# Patient Record
Sex: MALE | Race: WHITE | NOT HISPANIC OR LATINO | Employment: FULL TIME | URBAN - NONMETROPOLITAN AREA
[De-identification: names, ages, dates, MRNs, and addresses within clinical notes are randomized per-mention and may not be internally consistent; named-entity substitution may affect disease eponyms.]

---

## 2017-04-06 ENCOUNTER — OFFICE VISIT (OUTPATIENT)
Dept: INTERNAL MEDICINE | Facility: CLINIC | Age: 67
End: 2017-04-06

## 2017-04-06 VITALS
WEIGHT: 224 LBS | HEART RATE: 71 BPM | DIASTOLIC BLOOD PRESSURE: 76 MMHG | SYSTOLIC BLOOD PRESSURE: 130 MMHG | TEMPERATURE: 98.4 F | HEIGHT: 70 IN | OXYGEN SATURATION: 96 % | BODY MASS INDEX: 32.07 KG/M2

## 2017-04-06 DIAGNOSIS — Z11.59 NEED FOR HEPATITIS C SCREENING TEST: Primary | ICD-10-CM

## 2017-04-06 DIAGNOSIS — K22.70 BARRETT'S ESOPHAGUS WITHOUT DYSPLASIA: ICD-10-CM

## 2017-04-06 DIAGNOSIS — Z12.11 ENCOUNTER FOR SCREENING COLONOSCOPY: ICD-10-CM

## 2017-04-06 DIAGNOSIS — Z12.11 SCREEN FOR COLON CANCER: ICD-10-CM

## 2017-04-06 DIAGNOSIS — B35.1 ONYCHOMYCOSIS: ICD-10-CM

## 2017-04-06 DIAGNOSIS — I10 ESSENTIAL HYPERTENSION: ICD-10-CM

## 2017-04-06 DIAGNOSIS — E78.5 HYPERLIPIDEMIA, UNSPECIFIED HYPERLIPIDEMIA TYPE: ICD-10-CM

## 2017-04-06 DIAGNOSIS — E55.9 VITAMIN D DEFICIENCY: ICD-10-CM

## 2017-04-06 DIAGNOSIS — K21.9 GASTROESOPHAGEAL REFLUX DISEASE, ESOPHAGITIS PRESENCE NOT SPECIFIED: ICD-10-CM

## 2017-04-06 DIAGNOSIS — R73.03 PREDIABETES: ICD-10-CM

## 2017-04-06 DIAGNOSIS — R73.9 HYPERGLYCEMIA: ICD-10-CM

## 2017-04-06 PROCEDURE — 99214 OFFICE O/P EST MOD 30 MIN: CPT | Performed by: INTERNAL MEDICINE

## 2017-04-06 RX ORDER — SILDENAFIL 100 MG/1
100 TABLET, FILM COATED ORAL DAILY PRN
Qty: 25 TABLET | Refills: 1 | Status: SHIPPED | OUTPATIENT
Start: 2017-04-06 | End: 2017-04-11 | Stop reason: SDUPTHER

## 2017-04-06 NOTE — PROGRESS NOTES
"Chief Complaint   Patient presents with   • Follow-up     6 months for HLD, HTN, and Prediabetes. Pt would like to get colonoscopy set up.      Subjective   Erik Arcos is a 66 y.o. male.     HPI Comments: PMH of HTN, HLD, GERD, ED, preDM.   No CP, SOB, palpitations. Has occasional edema if on feet a lot.   GERD is well controlled. He has York's esophagus. His next EGD is due now.  His colonoscopy is also due now.   Does not check BS and rarely eats sweets. He denies any NTB of feet. Last eye exam was 4-5 yrs ago.  He does drink diet soda.   Takes his vitD daily.  He states the viagra is working well for him.        The following portions of the patient's history were reviewed and updated as appropriate: allergies, current medications, past family history, past medical history, past social history, past surgical history and problem list.    Review of Systems   Respiratory: Negative for shortness of breath.    Cardiovascular: Positive for leg swelling. Negative for chest pain and palpitations.   Gastrointestinal: Negative for abdominal pain and blood in stool.        Rare GERD symptoms   Genitourinary:        Erectile dysfunction   Musculoskeletal: Negative for myalgias.   All other systems reviewed and are negative.      Objective   /76  Pulse 71  Temp 98.4 °F (36.9 °C)  Ht 70\" (177.8 cm)  Wt 224 lb (102 kg)  SpO2 96%  BMI 32.14 kg/m2  Body mass index is 32.14 kg/(m^2).  Physical Exam   Constitutional: He is oriented to person, place, and time. He appears well-developed and well-nourished.   Obese   HENT:   Head: Normocephalic and atraumatic.   Mouth/Throat: Oropharynx is clear and moist.   Eyes: Conjunctivae and EOM are normal. Pupils are equal, round, and reactive to light.   Neck: Normal range of motion. Neck supple. No thyromegaly present.   Cardiovascular: Normal rate, regular rhythm, normal heart sounds and intact distal pulses.    No murmur heard.  Pulmonary/Chest: Effort normal and breath " sounds normal. No respiratory distress.   Abdominal: Soft. Bowel sounds are normal.   Musculoskeletal: He exhibits no edema.   Lymphadenopathy:     He has no cervical adenopathy.   Neurological: He is alert and oriented to person, place, and time. No cranial nerve deficit.   Skin:   Thickened and loose great toenails, no callus or ulcer of feet, normal sensation in feet   Psychiatric: He has a normal mood and affect. Judgment normal.   Nursing note and vitals reviewed.      Assessment/Plan   Erik Arcos is here today and the following problems have been addressed:      Erik was seen today for follow-up.    Diagnoses and all orders for this visit:    Need for hepatitis C screening test  -     Hepatitis C antibody    Essential hypertension  -     Basic Metabolic Panel    Hyperlipidemia, unspecified hyperlipidemia type  -     Lipid Panel    Vitamin D deficiency    Gastroesophageal reflux disease, esophagitis presence not specified    Prediabetes  -     Basic Metabolic Panel  -     Hemoglobin A1c  -     Ambulatory Referral to Ophthalmology    Encounter for screening colonoscopy  -     Ambulatory Referral to Gastroenterology    Hyperglycemia  -     Hemoglobin A1c    York's esophagus without dysplasia  -     Ambulatory Referral to Gastroenterology    Onychomycosis    Other orders  -     sildenafil (VIAGRA) 100 MG tablet; Take 1 tablet by mouth Daily As Needed for erectile dysfunction.    Follow heart healthy/low salt/low carbohydrate diet  Avoid processed foods  Monitor blood pressure as discussed  Exercise as tolerated up to 30 minutes 5 days per week  Take all medications as prescribed  Labs as noted  Referred for eye exam  Referred for EGD and colonoscopy  Will RTC for removal of great toenails bilaterally as these are loose    RTC one month for nail removal      Please note that portions of this note were completed with a voice recognition program.  Efforts were made to edit dictation, but occasionally  words are mistranscribed.

## 2017-04-07 LAB
BUN SERPL-MCNC: 14 MG/DL (ref 7–20)
BUN/CREAT SERPL: 17.5 (ref 6.3–21.9)
CALCIUM SERPL-MCNC: 9.9 MG/DL (ref 8.4–10.2)
CHLORIDE SERPL-SCNC: 100 MMOL/L (ref 98–107)
CHOLEST SERPL-MCNC: 162 MG/DL (ref 0–199)
CO2 SERPL-SCNC: 30 MMOL/L (ref 26–30)
CREAT SERPL-MCNC: 0.8 MG/DL (ref 0.6–1.3)
GLUCOSE SERPL-MCNC: 109 MG/DL (ref 74–98)
HBA1C MFR BLD: 5.5 %
HCV AB S/CO SERPL IA: 0.1 S/CO RATIO (ref 0–0.9)
HDLC SERPL-MCNC: 55 MG/DL (ref 40–60)
LDLC SERPL CALC-MCNC: 90 MG/DL (ref 0–99)
POTASSIUM SERPL-SCNC: 5 MMOL/L (ref 3.5–5.1)
SODIUM SERPL-SCNC: 138 MMOL/L (ref 137–145)
TRIGL SERPL-MCNC: 84 MG/DL
VLDLC SERPL CALC-MCNC: 16.8 MG/DL

## 2017-04-11 ENCOUNTER — TELEPHONE (OUTPATIENT)
Dept: INTERNAL MEDICINE | Facility: CLINIC | Age: 67
End: 2017-04-11

## 2017-04-11 RX ORDER — ATORVASTATIN CALCIUM 20 MG/1
20 TABLET, FILM COATED ORAL DAILY
Qty: 90 TABLET | Refills: 1 | Status: SHIPPED | OUTPATIENT
Start: 2017-04-11 | End: 2017-11-14 | Stop reason: SDUPTHER

## 2017-04-11 RX ORDER — SILDENAFIL 100 MG/1
100 TABLET, FILM COATED ORAL DAILY PRN
Qty: 25 TABLET | Refills: 1 | Status: SHIPPED | OUTPATIENT
Start: 2017-04-11 | End: 2017-11-14 | Stop reason: SDUPTHER

## 2017-04-11 RX ORDER — LISINOPRIL 40 MG/1
40 TABLET ORAL DAILY
Qty: 90 TABLET | Refills: 1 | Status: SHIPPED | OUTPATIENT
Start: 2017-04-11 | End: 2017-11-14 | Stop reason: SDUPTHER

## 2017-04-11 RX ORDER — OMEPRAZOLE 20 MG/1
20 CAPSULE, DELAYED RELEASE ORAL DAILY
Qty: 90 CAPSULE | Refills: 1 | Status: SHIPPED | OUTPATIENT
Start: 2017-04-11 | End: 2017-11-14 | Stop reason: SDUPTHER

## 2017-04-11 NOTE — TELEPHONE ENCOUNTER
----- Message from Rosie Calvillo sent at 4/11/2017  2:16 PM EDT -----  Contact: Patient  Patient said he was talking to you before about his meds being mail ordered.    He said you guys got cut off and he would like a callback.

## 2017-05-04 ENCOUNTER — PROCEDURE VISIT (OUTPATIENT)
Dept: INTERNAL MEDICINE | Facility: CLINIC | Age: 67
End: 2017-05-04

## 2017-05-04 VITALS
HEART RATE: 69 BPM | SYSTOLIC BLOOD PRESSURE: 128 MMHG | WEIGHT: 222.4 LBS | OXYGEN SATURATION: 97 % | DIASTOLIC BLOOD PRESSURE: 70 MMHG | BODY MASS INDEX: 31.84 KG/M2 | HEIGHT: 70 IN | TEMPERATURE: 97.4 F

## 2017-05-04 DIAGNOSIS — B35.1 ONYCHOMYCOSIS: Primary | ICD-10-CM

## 2017-05-04 PROCEDURE — 11750 EXCISION NAIL&NAIL MATRIX: CPT | Performed by: INTERNAL MEDICINE

## 2017-05-11 ENCOUNTER — OFFICE VISIT (OUTPATIENT)
Dept: INTERNAL MEDICINE | Facility: CLINIC | Age: 67
End: 2017-05-11

## 2017-05-11 VITALS
WEIGHT: 220 LBS | BODY MASS INDEX: 31.5 KG/M2 | SYSTOLIC BLOOD PRESSURE: 130 MMHG | OXYGEN SATURATION: 97 % | TEMPERATURE: 97.5 F | DIASTOLIC BLOOD PRESSURE: 80 MMHG | HEIGHT: 70 IN | HEART RATE: 66 BPM

## 2017-05-11 DIAGNOSIS — H00.015 HORDEOLUM EXTERNUM OF LEFT LOWER EYELID: ICD-10-CM

## 2017-05-11 DIAGNOSIS — B35.1 ONYCHOMYCOSIS: Primary | ICD-10-CM

## 2017-05-11 PROCEDURE — 99212 OFFICE O/P EST SF 10 MIN: CPT | Performed by: INTERNAL MEDICINE

## 2017-11-14 ENCOUNTER — OFFICE VISIT (OUTPATIENT)
Dept: INTERNAL MEDICINE | Facility: CLINIC | Age: 67
End: 2017-11-14

## 2017-11-14 VITALS
SYSTOLIC BLOOD PRESSURE: 124 MMHG | HEART RATE: 76 BPM | HEIGHT: 70 IN | OXYGEN SATURATION: 99 % | WEIGHT: 235 LBS | BODY MASS INDEX: 33.64 KG/M2 | TEMPERATURE: 97.9 F | DIASTOLIC BLOOD PRESSURE: 70 MMHG

## 2017-11-14 DIAGNOSIS — Z12.5 SCREENING PSA (PROSTATE SPECIFIC ANTIGEN): ICD-10-CM

## 2017-11-14 DIAGNOSIS — N52.9 MALE ERECTILE DISORDER: ICD-10-CM

## 2017-11-14 DIAGNOSIS — R73.03 PREDIABETES: ICD-10-CM

## 2017-11-14 DIAGNOSIS — R73.9 HYPERGLYCEMIA: ICD-10-CM

## 2017-11-14 DIAGNOSIS — E78.2 MIXED HYPERLIPIDEMIA: ICD-10-CM

## 2017-11-14 DIAGNOSIS — I10 ESSENTIAL HYPERTENSION: Primary | ICD-10-CM

## 2017-11-14 DIAGNOSIS — R39.12 POOR URINARY STREAM: ICD-10-CM

## 2017-11-14 LAB
BUN SERPL-MCNC: 15 MG/DL (ref 7–20)
BUN/CREAT SERPL: 21.4 (ref 6.3–21.9)
CALCIUM SERPL-MCNC: 9.6 MG/DL (ref 8.4–10.2)
CHLORIDE SERPL-SCNC: 100 MMOL/L (ref 98–107)
CO2 SERPL-SCNC: 26 MMOL/L (ref 26–30)
CREAT SERPL-MCNC: 0.7 MG/DL (ref 0.6–1.3)
GFR SERPLBLD CREATININE-BSD FMLA CKD-EPI: 112 ML/MIN/1.73
GFR SERPLBLD CREATININE-BSD FMLA CKD-EPI: 136 ML/MIN/1.73
GLUCOSE SERPL-MCNC: 105 MG/DL (ref 74–98)
HBA1C MFR BLD: 5.6 %
POC CREATININE URINE: 200
POC MICROALBUMIN URINE: 10
POTASSIUM SERPL-SCNC: 4.9 MMOL/L (ref 3.5–5.1)
PSA SERPL-MCNC: 2.74 NG/ML (ref 0.06–4)
SODIUM SERPL-SCNC: 138 MMOL/L (ref 137–145)

## 2017-11-14 PROCEDURE — 90670 PCV13 VACCINE IM: CPT | Performed by: INTERNAL MEDICINE

## 2017-11-14 PROCEDURE — G0009 ADMIN PNEUMOCOCCAL VACCINE: HCPCS | Performed by: INTERNAL MEDICINE

## 2017-11-14 PROCEDURE — 82044 UR ALBUMIN SEMIQUANTITATIVE: CPT | Performed by: INTERNAL MEDICINE

## 2017-11-14 PROCEDURE — 99213 OFFICE O/P EST LOW 20 MIN: CPT | Performed by: INTERNAL MEDICINE

## 2017-11-14 RX ORDER — ATORVASTATIN CALCIUM 20 MG/1
20 TABLET, FILM COATED ORAL DAILY
Qty: 90 TABLET | Refills: 1 | Status: SHIPPED | OUTPATIENT
Start: 2017-11-14 | End: 2017-11-17 | Stop reason: SDUPTHER

## 2017-11-14 RX ORDER — OMEPRAZOLE 20 MG/1
20 CAPSULE, DELAYED RELEASE ORAL DAILY
Qty: 90 CAPSULE | Refills: 1 | Status: SHIPPED | OUTPATIENT
Start: 2017-11-14 | End: 2017-11-17 | Stop reason: SDUPTHER

## 2017-11-14 RX ORDER — LISINOPRIL 40 MG/1
40 TABLET ORAL DAILY
Qty: 90 TABLET | Refills: 1 | Status: SHIPPED | OUTPATIENT
Start: 2017-11-14 | End: 2017-11-17 | Stop reason: SDUPTHER

## 2017-11-14 RX ORDER — SILDENAFIL 100 MG/1
100 TABLET, FILM COATED ORAL DAILY PRN
Qty: 25 TABLET | Refills: 1 | Status: SHIPPED | OUTPATIENT
Start: 2017-11-14 | End: 2017-11-17 | Stop reason: SDUPTHER

## 2017-11-14 NOTE — PROGRESS NOTES
"Chief Complaint   Patient presents with   • Follow-up     6 months for HLD, HTN, and prediabetes. No new complaints.      Subjective   Erik Arcos is a 66 y.o. male.     HPI Comments: Here for follow-up of HTN, HLD, GERD, ED, preDM.   No CP, SOB, palpitations. Has occasional edema if on feet a lot.  His BP is well controlled.   He has had upper and lower endoscopy.  He will need both again in 3 yrs.   Had one polyp on colonoscopy.  He takes his lipitor every PM.  He does drink diet soda, avoids most sweets.  A1C was 5.5 in April.   His GERD is usually well controlled.   He does not have any nocturia, no hesitancy or dribbling.  No prostate CA in family.            The following portions of the patient's history were reviewed and updated as appropriate: allergies, current medications, past family history, past medical history, past social history, past surgical history and problem list.    Review of Systems   Respiratory: Negative for shortness of breath.    Cardiovascular: Positive for leg swelling. Negative for chest pain and palpitations.   Gastrointestinal:        Rare GERD symptoms   Genitourinary: Negative.    Musculoskeletal: Positive for arthralgias. Negative for myalgias.   All other systems reviewed and are negative.      Objective   /70  Pulse 76  Temp 97.9 °F (36.6 °C)  Ht 70\" (177.8 cm)  Wt 235 lb (107 kg)  SpO2 99%  BMI 33.72 kg/m2  Body mass index is 33.72 kg/(m^2).  Physical Exam   Constitutional: He is oriented to person, place, and time. He appears well-developed and well-nourished.   HENT:   Head: Normocephalic and atraumatic.   Mouth/Throat: Oropharynx is clear and moist.   Eyes: Conjunctivae and EOM are normal. Pupils are equal, round, and reactive to light.   Neck: Normal range of motion. Neck supple. No thyromegaly present.   Cardiovascular: Normal rate, regular rhythm, normal heart sounds and intact distal pulses.    No murmur heard.  Pulmonary/Chest: Effort normal and breath " sounds normal. No respiratory distress.   Abdominal: Soft. Bowel sounds are normal.   Musculoskeletal: He exhibits no edema.   Lymphadenopathy:     He has no cervical adenopathy.   Neurological: He is alert and oriented to person, place, and time. No cranial nerve deficit.   Psychiatric: He has a normal mood and affect. Judgment normal.   Nursing note and vitals reviewed.      Assessment/Plan   Erik Arcos is here today and the following problems have been addressed:      Eirk was seen today for follow-up.    Diagnoses and all orders for this visit:    Essential hypertension  -     Basic Metabolic Panel    Mixed hyperlipidemia    Hyperglycemia  -     Basic Metabolic Panel  -     Hemoglobin A1c    Screening PSA (prostate specific antigen)  -     PSA    Male erectile disorder  -     PSA    Poor urinary stream   -     PSA    Other orders  -     sildenafil (VIAGRA) 100 MG tablet; Take 1 tablet by mouth Daily As Needed for erectile dysfunction.  -     omeprazole (priLOSEC) 20 MG capsule; Take 1 capsule by mouth Daily.  -     lisinopril (PRINIVIL,ZESTRIL) 40 MG tablet; Take 1 tablet by mouth Daily.  -     atorvastatin (LIPITOR) 20 MG tablet; Take 1 tablet by mouth Daily.    Follow heart healthy/low salt diet  Avoid processed foods  Monitor blood pressure as discussed  Exercise as tolerated up to 30 minutes 5 days per week  Take all medications as prescribed  Labs as noted  Declines flu shot  He will have prevnar vaccine 13 today  He is to work on diet to avoid sweets and pop    RTC 6 mo  Please note that portions of this note were completed with a voice recognition program.  Efforts were made to edit dictation, but occasionally words are mistranscribed.

## 2017-11-17 RX ORDER — SILDENAFIL 100 MG/1
100 TABLET, FILM COATED ORAL DAILY PRN
Qty: 25 TABLET | Refills: 1 | Status: SHIPPED | OUTPATIENT
Start: 2017-11-17 | End: 2018-05-16 | Stop reason: SDUPTHER

## 2017-11-17 RX ORDER — OMEPRAZOLE 20 MG/1
20 CAPSULE, DELAYED RELEASE ORAL DAILY
Qty: 90 CAPSULE | Refills: 3 | Status: SHIPPED | OUTPATIENT
Start: 2017-11-17 | End: 2019-01-23 | Stop reason: SDUPTHER

## 2017-11-17 RX ORDER — LISINOPRIL 40 MG/1
40 TABLET ORAL DAILY
Qty: 90 TABLET | Refills: 1 | Status: SHIPPED | OUTPATIENT
Start: 2017-11-17 | End: 2018-05-16 | Stop reason: SDUPTHER

## 2017-11-17 RX ORDER — ATORVASTATIN CALCIUM 20 MG/1
20 TABLET, FILM COATED ORAL DAILY
Qty: 90 TABLET | Refills: 1 | Status: SHIPPED | OUTPATIENT
Start: 2017-11-17 | End: 2018-05-16 | Stop reason: SDUPTHER

## 2018-05-16 ENCOUNTER — OFFICE VISIT (OUTPATIENT)
Dept: INTERNAL MEDICINE | Facility: CLINIC | Age: 68
End: 2018-05-16

## 2018-05-16 VITALS
WEIGHT: 248 LBS | HEART RATE: 76 BPM | HEIGHT: 70 IN | DIASTOLIC BLOOD PRESSURE: 80 MMHG | SYSTOLIC BLOOD PRESSURE: 124 MMHG | BODY MASS INDEX: 35.5 KG/M2 | TEMPERATURE: 97.9 F | OXYGEN SATURATION: 96 %

## 2018-05-16 DIAGNOSIS — E78.2 MIXED HYPERLIPIDEMIA: ICD-10-CM

## 2018-05-16 DIAGNOSIS — K21.00 GASTROESOPHAGEAL REFLUX DISEASE WITH ESOPHAGITIS: ICD-10-CM

## 2018-05-16 DIAGNOSIS — I10 ESSENTIAL HYPERTENSION: Primary | ICD-10-CM

## 2018-05-16 DIAGNOSIS — R73.03 PREDIABETES: ICD-10-CM

## 2018-05-16 PROBLEM — R73.9 HYPERGLYCEMIA: Status: RESOLVED | Noted: 2017-04-06 | Resolved: 2018-05-16

## 2018-05-16 PROBLEM — H00.015 HORDEOLUM EXTERNUM OF LEFT LOWER EYELID: Status: RESOLVED | Noted: 2017-05-11 | Resolved: 2018-05-16

## 2018-05-16 LAB
ALBUMIN SERPL-MCNC: 4.4 G/DL (ref 3.5–5)
ALBUMIN/GLOB SERPL: 1.6 G/DL (ref 1–2)
ALP SERPL-CCNC: 67 U/L (ref 38–126)
ALT SERPL-CCNC: 32 U/L (ref 13–69)
AST SERPL-CCNC: 25 U/L (ref 15–46)
BASOPHILS # BLD AUTO: 0.04 10*3/MM3 (ref 0–0.2)
BASOPHILS NFR BLD AUTO: 0.6 % (ref 0–2.5)
BILIRUB SERPL-MCNC: 0.7 MG/DL (ref 0.2–1.3)
BUN SERPL-MCNC: 14 MG/DL (ref 7–20)
BUN/CREAT SERPL: 20 (ref 6.3–21.9)
CALCIUM SERPL-MCNC: 9.5 MG/DL (ref 8.4–10.2)
CHLORIDE SERPL-SCNC: 98 MMOL/L (ref 98–107)
CHOLEST SERPL-MCNC: 175 MG/DL (ref 0–199)
CO2 SERPL-SCNC: 31 MMOL/L (ref 26–30)
CREAT SERPL-MCNC: 0.7 MG/DL (ref 0.6–1.3)
EOSINOPHIL # BLD AUTO: 0.14 10*3/MM3 (ref 0–0.7)
EOSINOPHIL NFR BLD AUTO: 2.2 % (ref 0–7)
ERYTHROCYTE [DISTWIDTH] IN BLOOD BY AUTOMATED COUNT: 13.3 % (ref 11.5–14.5)
GFR SERPLBLD CREATININE-BSD FMLA CKD-EPI: 112 ML/MIN/1.73
GFR SERPLBLD CREATININE-BSD FMLA CKD-EPI: 136 ML/MIN/1.73
GLOBULIN SER CALC-MCNC: 2.7 GM/DL
GLUCOSE SERPL-MCNC: 108 MG/DL (ref 74–98)
HBA1C MFR BLD: 5.7 %
HCT VFR BLD AUTO: 43.2 % (ref 42–52)
HDLC SERPL-MCNC: 60 MG/DL (ref 40–60)
HGB BLD-MCNC: 14.3 G/DL (ref 14–18)
IMM GRANULOCYTES # BLD: 0.03 10*3/MM3 (ref 0–0.06)
IMM GRANULOCYTES NFR BLD: 0.5 % (ref 0–0.6)
LDLC SERPL CALC-MCNC: 97 MG/DL (ref 0–99)
LYMPHOCYTES # BLD AUTO: 1.27 10*3/MM3 (ref 0.6–3.4)
LYMPHOCYTES NFR BLD AUTO: 19.9 % (ref 10–50)
MCH RBC QN AUTO: 28.9 PG (ref 27–31)
MCHC RBC AUTO-ENTMCNC: 33.1 G/DL (ref 30–37)
MCV RBC AUTO: 87.3 FL (ref 80–94)
MONOCYTES # BLD AUTO: 0.43 10*3/MM3 (ref 0–0.9)
MONOCYTES NFR BLD AUTO: 6.7 % (ref 0–12)
NEUTROPHILS # BLD AUTO: 4.47 10*3/MM3 (ref 2–6.9)
NEUTROPHILS NFR BLD AUTO: 70.1 % (ref 37–80)
NRBC BLD AUTO-RTO: 0 /100 WBC (ref 0–0)
PLATELET # BLD AUTO: 268 10*3/MM3 (ref 130–400)
POTASSIUM SERPL-SCNC: 4.6 MMOL/L (ref 3.5–5.1)
PROT SERPL-MCNC: 7.1 G/DL (ref 6.3–8.2)
RBC # BLD AUTO: 4.95 10*6/MM3 (ref 4.7–6.1)
SODIUM SERPL-SCNC: 138 MMOL/L (ref 137–145)
TRIGL SERPL-MCNC: 92 MG/DL
VLDLC SERPL CALC-MCNC: 18.4 MG/DL
WBC # BLD AUTO: 6.38 10*3/MM3 (ref 4.8–10.8)

## 2018-05-16 PROCEDURE — 99213 OFFICE O/P EST LOW 20 MIN: CPT | Performed by: INTERNAL MEDICINE

## 2018-05-16 RX ORDER — ATORVASTATIN CALCIUM 20 MG/1
20 TABLET, FILM COATED ORAL DAILY
Qty: 90 TABLET | Refills: 3 | Status: SHIPPED | OUTPATIENT
Start: 2018-05-16 | End: 2019-08-06 | Stop reason: SDUPTHER

## 2018-05-16 RX ORDER — LISINOPRIL 40 MG/1
40 TABLET ORAL DAILY
Qty: 90 TABLET | Refills: 1 | Status: SHIPPED | OUTPATIENT
Start: 2018-05-16 | End: 2019-01-23 | Stop reason: SDUPTHER

## 2018-05-16 RX ORDER — SILDENAFIL 100 MG/1
100 TABLET, FILM COATED ORAL DAILY PRN
Qty: 25 TABLET | Refills: 1 | Status: SHIPPED | OUTPATIENT
Start: 2018-05-16 | End: 2019-01-23 | Stop reason: SDUPTHER

## 2018-05-16 NOTE — PROGRESS NOTES
"Chief Complaint   Patient presents with   • Follow-up     6 months for HLD and HTN. No new complaints.      Subjective   Erik Arcos is a 67 y.o. male.     Here for follow-up of HTN, HLD, GERD, ED, preDM.   BP is well controlled here, he does not check it at home.  No CP, SOB, palpitations or edema  He had a colonoscopy last yr in June.  No black stools or blood in stool.   His weight is up 13 lbs over past 6 mo, he did not exercise or follow HH diet over the winter.  He is more active over the summer months.  He does drink soda at work.  Has occasional GERD sxs, worse since wt gain.    He continues to take vit D daily.   osteobiflex is helpful for his joint pain due to DJD.          The following portions of the patient's history were reviewed and updated as appropriate: allergies, current medications, past family history, past medical history, past social history, past surgical history and problem list.    Review of Systems   Constitutional: Positive for unexpected weight change. Negative for activity change and appetite change.   HENT: Positive for postnasal drip.    Respiratory: Negative for shortness of breath.    Cardiovascular: Negative for chest pain, palpitations and leg swelling.   Gastrointestinal: Negative for abdominal pain and blood in stool.        Occasional GERD sxs   Genitourinary: Negative.    Musculoskeletal: Positive for arthralgias.   Allergic/Immunologic: Positive for environmental allergies.   Psychiatric/Behavioral: Negative for dysphoric mood.   All other systems reviewed and are negative.      Objective   /80   Pulse 76   Temp 97.9 °F (36.6 °C)   Ht 177.8 cm (70\")   Wt 112 kg (248 lb)   SpO2 96%   BMI 35.58 kg/m²   Body mass index is 35.58 kg/m².  Physical Exam   Constitutional: He is oriented to person, place, and time. He appears well-developed and well-nourished.   Pleasant man, obese, NAD   HENT:   Head: Normocephalic and atraumatic.   Mouth/Throat: Oropharynx is clear " and moist.   Eyes: Conjunctivae and EOM are normal. Pupils are equal, round, and reactive to light.   Neck: Normal range of motion. Neck supple. No thyromegaly present.   Cardiovascular: Normal rate, regular rhythm, normal heart sounds and intact distal pulses.    No murmur heard.  Pulmonary/Chest: Effort normal and breath sounds normal. No respiratory distress.   Abdominal: Soft. Bowel sounds are normal.   Musculoskeletal: He exhibits no edema.   Lymphadenopathy:     He has no cervical adenopathy.   Neurological: He is alert and oriented to person, place, and time. No cranial nerve deficit.   Skin:   Right upper back with large lipoma noted   Psychiatric: He has a normal mood and affect. Judgment normal.   Nursing note and vitals reviewed.      Assessment/Plan   Erik Arcos is here today and the following problems have been addressed:      Erik was seen today for follow-up.    Diagnoses and all orders for this visit:    Essential hypertension  -     CBC & Differential  -     Comprehensive Metabolic Panel    Mixed hyperlipidemia  -     Comprehensive Metabolic Panel  -     Lipid Panel    Gastroesophageal reflux disease with esophagitis    Prediabetes  -     Comprehensive Metabolic Panel  -     Hemoglobin A1c    Other orders  -     lisinopril (PRINIVIL,ZESTRIL) 40 MG tablet; Take 1 tablet by mouth Daily.  -     sildenafil (VIAGRA) 100 MG tablet; Take 1 tablet by mouth Daily As Needed for erectile dysfunction.  -     atorvastatin (LIPITOR) 20 MG tablet; Take 1 tablet by mouth Daily.    Follow heart healthy/low salt/low sweets diet  Avoid processed foods  Monitor blood pressure as discussed  Exercise as tolerated up to 30 minutes 5 days per week  Take all medications as prescribed  Labs as noted  Continue omeprazole for GERD sxs  Continue osteobiflex for DJD  Decrease portion size and avoid sodas to help with wt loss    RTC 6 mo for medicare wellness exam          Please note that portions of this note were  completed with a voice recognition program.  Efforts were made to edit dictation, but occasionally words are mistranscribed.

## 2018-11-20 ENCOUNTER — OFFICE VISIT (OUTPATIENT)
Dept: INTERNAL MEDICINE | Facility: CLINIC | Age: 68
End: 2018-11-20

## 2018-11-20 VITALS
WEIGHT: 251.25 LBS | SYSTOLIC BLOOD PRESSURE: 130 MMHG | BODY MASS INDEX: 35.97 KG/M2 | TEMPERATURE: 97.1 F | HEIGHT: 70 IN | OXYGEN SATURATION: 97 % | DIASTOLIC BLOOD PRESSURE: 82 MMHG | HEART RATE: 79 BPM

## 2018-11-20 DIAGNOSIS — K22.70 BARRETT'S ESOPHAGUS WITHOUT DYSPLASIA: ICD-10-CM

## 2018-11-20 DIAGNOSIS — Z12.5 SCREENING PSA (PROSTATE SPECIFIC ANTIGEN): ICD-10-CM

## 2018-11-20 DIAGNOSIS — I10 ESSENTIAL HYPERTENSION: ICD-10-CM

## 2018-11-20 DIAGNOSIS — R73.03 PREDIABETES: ICD-10-CM

## 2018-11-20 DIAGNOSIS — B35.1 ONYCHOMYCOSIS: Primary | ICD-10-CM

## 2018-11-20 DIAGNOSIS — E78.2 MIXED HYPERLIPIDEMIA: ICD-10-CM

## 2018-11-20 DIAGNOSIS — E55.9 VITAMIN D DEFICIENCY: ICD-10-CM

## 2018-11-20 DIAGNOSIS — Z00.00 ENCOUNTER FOR MEDICARE ANNUAL WELLNESS EXAM: ICD-10-CM

## 2018-11-20 LAB
POC CREATININE URINE: 200
POC MICROALBUMIN URINE: 30
PSA SERPL-MCNC: 2.83 NG/ML (ref 0.06–4)

## 2018-11-20 PROCEDURE — 82044 UR ALBUMIN SEMIQUANTITATIVE: CPT | Performed by: INTERNAL MEDICINE

## 2018-11-20 PROCEDURE — G0439 PPPS, SUBSEQ VISIT: HCPCS | Performed by: INTERNAL MEDICINE

## 2018-11-20 NOTE — PROGRESS NOTES
QUICK REFERENCE INFORMATION:  The ABCs of the Annual Wellness Visit    Subsequent Medicare Wellness Visit    HEALTH RISK ASSESSMENT    1950    Recent Hospitalizations:  No hospitalization(s) within the last year..        Current Medical Providers:  Patient Care Team:  Vermeesch, Marilyn K, MD as PCP - General  Vermeesch, Marilyn K, MD as PCP - Family Medicine  Vermeesch, Marilyn K, MD as PCP - Claims Attributed        Smoking Status:  Social History     Tobacco Use   Smoking Status Former Smoker   Smokeless Tobacco Never Used       Alcohol Consumption:  Social History     Substance and Sexual Activity   Alcohol Use No       Depression Screen:   PHQ-2/PHQ-9 Depression Screening 11/20/2018   Little interest or pleasure in doing things 0   Feeling down, depressed, or hopeless 0   Trouble falling or staying asleep, or sleeping too much -   Feeling tired or having little energy -   Poor appetite or overeating -   Feeling bad about yourself - or that you are a failure or have let yourself or your family down -   Trouble concentrating on things, such as reading the newspaper or watching television -   Moving or speaking so slowly that other people could have noticed. Or the opposite - being so fidgety or restless that you have been moving around a lot more than usual -   Thoughts that you would be better off dead, or of hurting yourself in some way -   Total Score 0   If you checked off any problems, how difficult have these problems made it for you to do your work, take care of things at home, or get along with other people? -       Health Habits and Functional and Cognitive Screening:  Functional & Cognitive Status 11/20/2018   Do you have difficulty preparing food and eating? No   Do you have difficulty bathing yourself, getting dressed or grooming yourself? No   Do you have difficulty using the toilet? No   Do you have difficulty moving around from place to place? No   Do you have trouble with steps or getting  out of a bed or a chair? No   In the past year have you fallen or experienced a near fall? No   Current Diet Well Balanced Diet   Dental Exam Not up to date   Eye Exam Up to date   Exercise (times per week) 4 times per week   Current Exercise Activities Include Walking   Do you need help using the phone?  No   Are you deaf or do you have serious difficulty hearing?  No   Do you need help with transportation? No   Do you need help shopping? No   Do you need help preparing meals?  No   Do you need help with housework?  No   Do you need help with laundry? No   Do you need help taking your medications? No   Do you need help managing money? No   Do you ever drive or ride in a car without wearing a seat belt? No   Have you felt unusual stress, anger or loneliness in the last month? No   Who do you live with? Spouse   If you need help, do you have trouble finding someone available to you? No   Have you been bothered in the last four weeks by sexual problems? No   Do you have difficulty concentrating, remembering or making decisions? No           Does the patient have evidence of cognitive impairment? No    Aspirin use counseling: Taking ASA appropriately as indicated      Recent Lab Results:  CMP:  Lab Results   Component Value Date     (H) 05/16/2018    BUN 14 05/16/2018    CREATININE 0.70 05/16/2018    EGFRIFNONA 112 05/16/2018    EGFRIFAFRI 136 05/16/2018    BCR 20.0 05/16/2018     05/16/2018    K 4.6 05/16/2018    CO2 31.0 (H) 05/16/2018    CALCIUM 9.5 05/16/2018    PROTENTOTREF 7.1 05/16/2018    ALBUMIN 4.40 05/16/2018    LABGLOBREF 2.7 05/16/2018    LABIL2 1.6 05/16/2018    BILITOT 0.7 05/16/2018    ALKPHOS 67 05/16/2018    AST 25 05/16/2018    ALT 32 05/16/2018     Lipid Panel:  Lab Results   Component Value Date    TRIG 92 05/16/2018    HDL 60 05/16/2018    VLDL 18.4 05/16/2018     HbA1c:  Lab Results   Component Value Date    HGBA1C 5.70 05/16/2018       Visual Acuity:  No exam data  present    Age-appropriate Screening Schedule:  Refer to the list below for future screening recommendations based on patient's age, sex and/or medical conditions. Orders for these recommended tests are listed in the plan section. The patient has been provided with a written plan.    Health Maintenance   Topic Date Due   • ZOSTER VACCINE (1 of 2) 11/22/1998   • DIABETIC FOOT EXAM  08/03/2016   • DIABETIC EYE EXAM  04/27/2018   • URINE MICROALBUMIN  11/14/2018   • HEMOGLOBIN A1C  11/16/2018   • LIPID PANEL  05/16/2019   • TDAP/TD VACCINES (2 - Td) 12/31/2023   • PNEUMOCOCCAL VACCINES (65+ LOW/MEDIUM RISK)  Completed   • INFLUENZA VACCINE  Discontinued        Subjective   History of Present Illness    Erik Arcos is a 67 y.o. male who presents for an Subsequent Wellness Visit.  PMH of HTN, HLD, GERD with Barretts, vit D def, ED, preDM.  BP is controlled, he does not check it at home.  He denies any GERD sxs with use of prilosec. He continues to take vit D 1000 u daily.  He is working on avoidance of sweets.  He is not exercising much, but does walk as a . He had colonoscopy 2017, next due in 2022.     The following portions of the patient's history were reviewed and updated as appropriate: allergies, current medications, past family history, past medical history, past social history, past surgical history and problem list.    Outpatient Medications Prior to Visit   Medication Sig Dispense Refill   • aspirin 81 MG tablet Take  by mouth Daily.     • atorvastatin (LIPITOR) 20 MG tablet Take 1 tablet by mouth Daily. 90 tablet 3   • Cholecalciferol (VITAMIN D-3) 1000 UNITS capsule Take  by mouth Daily.     • Glucosamine-Chondroitin (OSTEO BI-FLEX REGULAR STRENGTH) 250-200 MG tablet Take  by mouth.     • lisinopril (PRINIVIL,ZESTRIL) 40 MG tablet Take 1 tablet by mouth Daily. 90 tablet 1   • omeprazole (priLOSEC) 20 MG capsule Take 1 capsule by mouth Daily. 90 capsule 3   • sildenafil (VIAGRA) 100 MG  tablet Take 1 tablet by mouth Daily As Needed for erectile dysfunction. 25 tablet 1     No facility-administered medications prior to visit.        Patient Active Problem List   Diagnosis   • York's esophagus   • Esophageal reflux   • Hyperlipidemia   • Hypertension   • Male erectile disorder   • Prediabetes   • Vitamin D deficiency   • Encounter for screening colonoscopy   • Onychomycosis   • Screening PSA (prostate specific antigen)   • Encounter for Medicare annual wellness exam       Advance Care Planning:  has NO advance directive - information provided to the patient today    Identification of Risk Factors:  Risk factors include: weight , cardiovascular risk and inactivity.    Review of Systems   Constitutional: Negative.    HENT: Negative.    Eyes: Negative.    Respiratory: Positive for shortness of breath.    Cardiovascular: Negative.    Gastrointestinal: Negative.    Endocrine: Negative.    Genitourinary: Negative.    Musculoskeletal: Positive for arthralgias.   Skin: Negative.    Allergic/Immunologic: Negative.    Neurological: Negative.    Hematological: Bruises/bleeds easily.   Psychiatric/Behavioral: Negative.        Compared to one year ago, the patient feels his physical health is worse.  Compared to one year ago, the patient feels his mental health is the same.    Objective     Physical Exam   Constitutional: He is oriented to person, place, and time. He appears well-developed and well-nourished.   Very pleasant gentleman in no obvious distress   HENT:   Head: Normocephalic and atraumatic.   Right Ear: External ear normal.   Left Ear: External ear normal.   Mouth/Throat: Oropharynx is clear and moist.   Eyes: Conjunctivae and EOM are normal. Pupils are equal, round, and reactive to light.   Neck: Normal range of motion. Neck supple. No thyromegaly present.   Cardiovascular: Normal rate, regular rhythm, normal heart sounds and intact distal pulses.   No murmur heard.  Pulmonary/Chest: Effort normal  "and breath sounds normal. No respiratory distress. He has no wheezes.   Abdominal: Soft. Bowel sounds are normal. He exhibits no distension. There is no tenderness.   Obesity limits exam   Musculoskeletal: Normal range of motion. He exhibits no edema.   Lymphadenopathy:     He has no cervical adenopathy.   Neurological: He is alert and oriented to person, place, and time. He displays normal reflexes. No cranial nerve deficit. Coordination normal.   Skin:   Feet with no callus or ulcer, great toenails are thickened and yellowed bilaterally, left fifth toenail is thickened and brown in color   Psychiatric: He has a normal mood and affect. His behavior is normal. Judgment and thought content normal.   Nursing note and vitals reviewed.      Vitals:    11/20/18 1327   BP: 130/82   Pulse: 79   Temp: 97.1 °F (36.2 °C)   TempSrc: Temporal   SpO2: 97%   Weight: 114 kg (251 lb 4 oz)   Height: 177.8 cm (70\")   PainSc: 0-No pain       Patient's Body mass index is 36.05 kg/m². BMI is above normal parameters. Recommendations include: exercise counseling and nutrition counseling.      Assessment/Plan   Patient Self-Management and Personalized Health Advice  The patient has been provided with information about: diet, exercise, the relationship between weight and GERD, designing advance directives and supplements and preventive services including:   · Advance directive, Exercise counseling provided, Nutrition counseling provided, recommned shingles shot at local pharmacy.    Visit Diagnoses:    ICD-10-CM ICD-9-CM   1. Onychomycosis B35.1 110.1   2. Prediabetes R73.03 790.29   3. Encounter for Medicare annual wellness exam Z00.00 V70.0   4. Screening PSA (prostate specific antigen) Z12.5 V76.44   5. Essential hypertension I10 401.9   6. Mixed hyperlipidemia E78.2 272.2   7. York's esophagus without dysplasia K22.70 530.85   8. Vitamin D deficiency E55.9 268.9       Orders Placed This Encounter   Procedures   • PSA Screen   • POCT " microalbumin       Outpatient Encounter Medications as of 11/20/2018   Medication Sig Dispense Refill   • aspirin 81 MG tablet Take  by mouth Daily.     • atorvastatin (LIPITOR) 20 MG tablet Take 1 tablet by mouth Daily. 90 tablet 3   • Cholecalciferol (VITAMIN D-3) 1000 UNITS capsule Take  by mouth Daily.     • Glucosamine-Chondroitin (OSTEO BI-FLEX REGULAR STRENGTH) 250-200 MG tablet Take  by mouth.     • lisinopril (PRINIVIL,ZESTRIL) 40 MG tablet Take 1 tablet by mouth Daily. 90 tablet 1   • omeprazole (priLOSEC) 20 MG capsule Take 1 capsule by mouth Daily. 90 capsule 3   • sildenafil (VIAGRA) 100 MG tablet Take 1 tablet by mouth Daily As Needed for erectile dysfunction. 25 tablet 1     No facility-administered encounter medications on file as of 11/20/2018.        Reviewed use of high risk medication in the elderly: yes  Reviewed for potential of harmful drug interactions in the elderly: yes     Given info on advance directives  Recommend he decrease portion sizes to help with wt loss  Recommend shingles vaccine, he has had flu and hepatitis A vaccines  Labs as noted  HC 1% to ears for itching  Soak feet in warm water and apply Vicks due to fungal infection on nails  Continue all current medications  He walks regularly at work, however I recommend he add light weights 3 times weekly to help improve muscle tone and help with weight loss    Follow Up:  Return in about 6 months (around 5/20/2019) for Next scheduled follow up.     An After Visit Summary and PPPS with all of these plans were given to the patient.

## 2019-01-23 RX ORDER — OMEPRAZOLE 20 MG/1
CAPSULE, DELAYED RELEASE ORAL
Qty: 90 CAPSULE | Refills: 3 | Status: SHIPPED | OUTPATIENT
Start: 2019-01-23 | End: 2020-02-21

## 2019-01-23 RX ORDER — LISINOPRIL 40 MG/1
TABLET ORAL
Qty: 90 TABLET | Refills: 1 | Status: SHIPPED | OUTPATIENT
Start: 2019-01-23 | End: 2019-08-06 | Stop reason: SDUPTHER

## 2019-01-23 RX ORDER — SILDENAFIL 100 MG/1
TABLET, FILM COATED ORAL
Qty: 25 TABLET | Refills: 1 | Status: SHIPPED | OUTPATIENT
Start: 2019-01-23 | End: 2019-08-06 | Stop reason: SDUPTHER

## 2019-05-21 ENCOUNTER — OFFICE VISIT (OUTPATIENT)
Dept: INTERNAL MEDICINE | Facility: CLINIC | Age: 69
End: 2019-05-21

## 2019-05-21 VITALS
TEMPERATURE: 97.9 F | WEIGHT: 241 LBS | HEIGHT: 70 IN | BODY MASS INDEX: 34.5 KG/M2 | HEART RATE: 88 BPM | DIASTOLIC BLOOD PRESSURE: 68 MMHG | OXYGEN SATURATION: 96 % | SYSTOLIC BLOOD PRESSURE: 124 MMHG

## 2019-05-21 DIAGNOSIS — K21.00 GASTROESOPHAGEAL REFLUX DISEASE WITH ESOPHAGITIS: ICD-10-CM

## 2019-05-21 DIAGNOSIS — R73.03 PREDIABETES: ICD-10-CM

## 2019-05-21 DIAGNOSIS — I10 ESSENTIAL HYPERTENSION: Primary | ICD-10-CM

## 2019-05-21 DIAGNOSIS — N52.9 MALE ERECTILE DISORDER: ICD-10-CM

## 2019-05-21 DIAGNOSIS — E78.2 MIXED HYPERLIPIDEMIA: ICD-10-CM

## 2019-05-21 DIAGNOSIS — E55.9 VITAMIN D DEFICIENCY: ICD-10-CM

## 2019-05-21 LAB
25(OH)D3+25(OH)D2 SERPL-MCNC: 53.9 NG/ML
ALBUMIN SERPL-MCNC: 4.5 G/DL (ref 3.5–5)
ALBUMIN/GLOB SERPL: 1.7 G/DL (ref 1–2)
ALP SERPL-CCNC: 60 U/L (ref 38–126)
ALT SERPL-CCNC: 33 U/L (ref 13–69)
AST SERPL-CCNC: 24 U/L (ref 15–46)
BASOPHILS # BLD AUTO: 0.04 10*3/MM3 (ref 0–0.2)
BASOPHILS NFR BLD AUTO: 0.6 % (ref 0–1.5)
BILIRUB SERPL-MCNC: 0.7 MG/DL (ref 0.2–1.3)
BUN SERPL-MCNC: 17 MG/DL (ref 7–20)
BUN/CREAT SERPL: 24.3 (ref 6.3–21.9)
CALCIUM SERPL-MCNC: 9.7 MG/DL (ref 8.4–10.2)
CHLORIDE SERPL-SCNC: 98 MMOL/L (ref 98–107)
CHOLEST SERPL-MCNC: 184 MG/DL (ref 0–199)
CHOLEST/HDLC SERPL: 3.07 {RATIO}
CO2 SERPL-SCNC: 31 MMOL/L (ref 26–30)
CREAT SERPL-MCNC: 0.7 MG/DL (ref 0.6–1.3)
EOSINOPHIL # BLD AUTO: 0.12 10*3/MM3 (ref 0–0.4)
EOSINOPHIL NFR BLD AUTO: 1.9 % (ref 0.3–6.2)
ERYTHROCYTE [DISTWIDTH] IN BLOOD BY AUTOMATED COUNT: 14 % (ref 12.3–15.4)
GLOBULIN SER CALC-MCNC: 2.6 GM/DL
GLUCOSE SERPL-MCNC: 112 MG/DL (ref 74–98)
HBA1C MFR BLD: 5.7 % (ref 4.8–5.6)
HCT VFR BLD AUTO: 44.8 % (ref 37.5–51)
HDLC SERPL-MCNC: 60 MG/DL (ref 40–60)
HGB BLD-MCNC: 14.7 G/DL (ref 13–17.7)
IMM GRANULOCYTES # BLD AUTO: 0.02 10*3/MM3 (ref 0–0.05)
IMM GRANULOCYTES NFR BLD AUTO: 0.3 % (ref 0–0.5)
LDLC SERPL CALC-MCNC: 106 MG/DL (ref 0–99)
LYMPHOCYTES # BLD AUTO: 1.2 10*3/MM3 (ref 0.7–3.1)
LYMPHOCYTES NFR BLD AUTO: 18.8 % (ref 19.6–45.3)
MCH RBC QN AUTO: 29.2 PG (ref 26.6–33)
MCHC RBC AUTO-ENTMCNC: 32.8 G/DL (ref 31.5–35.7)
MCV RBC AUTO: 88.9 FL (ref 79–97)
MONOCYTES # BLD AUTO: 0.38 10*3/MM3 (ref 0.1–0.9)
MONOCYTES NFR BLD AUTO: 6 % (ref 5–12)
NEUTROPHILS # BLD AUTO: 4.62 10*3/MM3 (ref 1.7–7)
NEUTROPHILS NFR BLD AUTO: 72.4 % (ref 42.7–76)
NRBC BLD AUTO-RTO: 0 /100 WBC (ref 0–0.2)
PLATELET # BLD AUTO: 226 10*3/MM3 (ref 140–450)
POTASSIUM SERPL-SCNC: 5.3 MMOL/L (ref 3.5–5.1)
PROT SERPL-MCNC: 7.1 G/DL (ref 6.3–8.2)
RBC # BLD AUTO: 5.04 10*6/MM3 (ref 4.14–5.8)
SODIUM SERPL-SCNC: 138 MMOL/L (ref 137–145)
TRIGL SERPL-MCNC: 89 MG/DL
VLDLC SERPL CALC-MCNC: 17.8 MG/DL
WBC # BLD AUTO: 6.38 10*3/MM3 (ref 3.4–10.8)

## 2019-05-21 PROCEDURE — 99214 OFFICE O/P EST MOD 30 MIN: CPT | Performed by: INTERNAL MEDICINE

## 2019-05-21 NOTE — PROGRESS NOTES
"Chief Complaint   Patient presents with   • Follow-up     for HLD and HTN.     Subjective   Erik Arcos is a 68 y.o. male.     Here for follow-up of HTN, HLD, GERD, ED, preDM.   HTN/HLD- BP is well controlled today, he does not check it.  No CP, palpitations, SOA.  Some edema at times. He takes his lipitor every night.  He does go out to eat about once a week  PreDM- he is avoiding sweets.  He has lost 10 lbs since last visit.  Last eye exam about one yr ago.    GERD with Barretts-he remains on omeprazole for GERD, well controlled  Vit d def- he remains on vit D 1000 u  ED- he uses a whole tab of viagra and it works well  HCM- colonoscopy done per Dr Delaney June 2017 next one due 2022.  He did get Hep A vaccine, trying to get shingrix vaccine.  His pneumovax series is UTD.          The following portions of the patient's history were reviewed and updated as appropriate: allergies, current medications, past family history, past medical history, past social history, past surgical history and problem list.    Review of Systems   Constitutional: Negative for activity change, appetite change and unexpected weight change.   Eyes: Negative for visual disturbance.   Respiratory: Negative for shortness of breath.    Cardiovascular: Positive for leg swelling. Negative for chest pain and palpitations.   Gastrointestinal: Negative for abdominal pain.   Neurological: Negative for headaches.       Objective   /68   Pulse 88   Temp 97.9 °F (36.6 °C)   Ht 177.8 cm (70\")   Wt 109 kg (241 lb)   SpO2 96%   BMI 34.58 kg/m²   Body mass index is 34.58 kg/m².  Physical Exam   Constitutional: He is oriented to person, place, and time. He appears well-developed and well-nourished. No distress.   Very pleasant man who appears his stated age, overweight   HENT:   Head: Normocephalic and atraumatic.   Right Ear: External ear normal.   Left Ear: External ear normal.   Mouth/Throat: Oropharynx is clear and moist.   Eyes: " Conjunctivae and EOM are normal. Pupils are equal, round, and reactive to light.   Neck: Normal range of motion. Neck supple. No thyromegaly present.   Cardiovascular: Normal rate, regular rhythm, normal heart sounds and intact distal pulses.   No murmur heard.  No carotid bruits   Pulmonary/Chest: Effort normal and breath sounds normal. No respiratory distress. He has no wheezes.   Abdominal: Soft. Bowel sounds are normal. He exhibits no distension. There is no tenderness.   Musculoskeletal: Normal range of motion. He exhibits no edema.   Lymphadenopathy:     He has no cervical adenopathy.   Neurological: He is alert and oriented to person, place, and time. No cranial nerve deficit. Coordination normal.   Psychiatric: He has a normal mood and affect. His behavior is normal. Judgment and thought content normal.   Nursing note and vitals reviewed.      Assessment/Plan   Erik Arcos is here today and the following problems have been addressed:      Erik was seen today for follow-up.    Diagnoses and all orders for this visit:    Essential hypertension  -     CBC & Differential  -     Comprehensive Metabolic Panel    Mixed hyperlipidemia  -     Comprehensive Metabolic Panel  -     Lipid Panel With / Chol / HDL Ratio    Gastroesophageal reflux disease with esophagitis    Vitamin D deficiency  -     Vitamin D 25 Hydroxy    Prediabetes  -     Hemoglobin A1c    Male erectile disorder        Follow heart healthy/low salt diet  Avoid processed foods  Monitor blood pressure as discussed  Exercise as tolerated   Take all medications as prescribed  Labs as noted  Continue omeprazole, GERD is well controlled  Continue daily vit d  viagra is working well at current dose  Colonoscopy is UTD until 2022      Return in about 6 months (around 11/21/2019) for Medicare Wellness.      Marilyn K. Vermeesch, MD      Please note that portions of this note were completed with a voice recognition program.  Efforts were made to edit  dictation, but occasionally words are mistranscribed.

## 2019-08-06 RX ORDER — LISINOPRIL 40 MG/1
40 TABLET ORAL DAILY
Qty: 90 TABLET | Refills: 1 | Status: SHIPPED | OUTPATIENT
Start: 2019-08-06 | End: 2020-01-13

## 2019-08-06 RX ORDER — ATORVASTATIN CALCIUM 20 MG/1
20 TABLET, FILM COATED ORAL DAILY
Qty: 90 TABLET | Refills: 3 | Status: SHIPPED | OUTPATIENT
Start: 2019-08-06 | End: 2020-06-16 | Stop reason: SDUPTHER

## 2019-08-06 RX ORDER — SILDENAFIL 100 MG/1
100 TABLET, FILM COATED ORAL DAILY PRN
Qty: 25 TABLET | Refills: 1 | Status: SHIPPED | OUTPATIENT
Start: 2019-08-06 | End: 2020-02-03

## 2019-12-11 ENCOUNTER — OFFICE VISIT (OUTPATIENT)
Dept: INTERNAL MEDICINE | Facility: CLINIC | Age: 69
End: 2019-12-11

## 2019-12-11 VITALS
DIASTOLIC BLOOD PRESSURE: 86 MMHG | TEMPERATURE: 97.9 F | HEART RATE: 86 BPM | HEIGHT: 71 IN | OXYGEN SATURATION: 97 % | RESPIRATION RATE: 16 BRPM | BODY MASS INDEX: 33.6 KG/M2 | SYSTOLIC BLOOD PRESSURE: 132 MMHG | WEIGHT: 240 LBS

## 2019-12-11 DIAGNOSIS — K21.00 GASTROESOPHAGEAL REFLUX DISEASE WITH ESOPHAGITIS: ICD-10-CM

## 2019-12-11 DIAGNOSIS — E78.2 MIXED HYPERLIPIDEMIA: ICD-10-CM

## 2019-12-11 DIAGNOSIS — I10 ESSENTIAL HYPERTENSION: ICD-10-CM

## 2019-12-11 DIAGNOSIS — R73.03 PREDIABETES: Primary | ICD-10-CM

## 2019-12-11 DIAGNOSIS — Z12.5 SCREENING PSA (PROSTATE SPECIFIC ANTIGEN): ICD-10-CM

## 2019-12-11 DIAGNOSIS — R22.2 MASS OF SUBCUTANEOUS TISSUE OF BACK: ICD-10-CM

## 2019-12-11 LAB — PSA SERPL-MCNC: 2.88 NG/ML (ref 0–4)

## 2019-12-11 PROCEDURE — G0008 ADMIN INFLUENZA VIRUS VAC: HCPCS | Performed by: INTERNAL MEDICINE

## 2019-12-11 PROCEDURE — 99214 OFFICE O/P EST MOD 30 MIN: CPT | Performed by: INTERNAL MEDICINE

## 2019-12-11 PROCEDURE — 90653 IIV ADJUVANT VACCINE IM: CPT | Performed by: INTERNAL MEDICINE

## 2019-12-11 NOTE — PROGRESS NOTES
Chief Complaint   Patient presents with   • Abstract     Patient states he noticed two knots pop up on his right shoulder blade, patient states they aren't causing him any pain.     Subjective   Eirk Arcos is a 69 y.o. male.     Here for follow-up of HTN, HLD, GERD, ED, preDM.   HTN/HLD- BP is well controlled today, he does not check it.  No CP, palpitations, SOA.  Some edema at times. He takes his lipitor every night.  He does go out to eat about once a week.  He does bring his lunch to work  PreDM- he is avoiding sweets.  A1c 5.7 six month ago.  Last eye exam was over a yr ago  GERD with Barretts-he remains on omeprazole for GERD, well controlled  Vit d def- he remains on vit D 1000 u  ED- he uses a whole tab of viagra and it works well.  He awakens about 1-2 times a night to urinate.  No hesitancy or dribbling  HCM- colonoscopy done per Dr Delaney June 2017 next one due 2022.  He did get Hep A vaccine, trying to get shingrix vaccine.  His pneumovax series is UTD.  He will get flu vaccine today.   He has noted 2 knots on right shoulder blade, they bother him when he leans up against a chair.         The following portions of the patient's history were reviewed and updated as appropriate: allergies, current medications, past family history, past medical history, past social history, past surgical history and problem list.    Review of Systems   Constitutional: Negative for activity change, appetite change and unexpected weight change.   Eyes: Negative for visual disturbance.   Respiratory: Negative for shortness of breath.    Cardiovascular: Positive for leg swelling. Negative for chest pain and palpitations.   Gastrointestinal: Negative for abdominal pain.   Genitourinary: Negative for enuresis, frequency and urgency.   Skin:        Right shoulder blade with 2 new subcutaneous masses that are nontender   Neurological: Negative for headaches.   Psychiatric/Behavioral: Negative for dysphoric mood and sleep  "disturbance. The patient is not nervous/anxious.        Objective   /86   Pulse 86   Temp 97.9 °F (36.6 °C)   Resp 16   Ht 180.3 cm (71\")   Wt 109 kg (240 lb)   SpO2 97%   BMI 33.47 kg/m²   Body mass index is 33.47 kg/m².  Physical Exam   Constitutional: He is oriented to person, place, and time. He appears well-developed and well-nourished. No distress.   Very pleasant gentleman who appears his stated age, he is in no distress today   HENT:   Head: Normocephalic and atraumatic.   Right Ear: External ear normal.   Left Ear: External ear normal.   Mouth/Throat: Oropharynx is clear and moist.   Eyes: Pupils are equal, round, and reactive to light. Conjunctivae and EOM are normal.   Neck: Normal range of motion. Neck supple. No thyromegaly present.   Cardiovascular: Normal rate, regular rhythm, normal heart sounds and intact distal pulses.   No murmur heard.  No carotid bruits   Pulmonary/Chest: Effort normal and breath sounds normal. No respiratory distress. He has no wheezes.   Abdominal: Soft. Bowel sounds are normal. He exhibits no distension. There is no tenderness.   Musculoskeletal: Normal range of motion. He exhibits no edema.   Lymphadenopathy:     He has no cervical adenopathy.   Neurological: He is alert and oriented to person, place, and time. No cranial nerve deficit. Coordination normal.   Skin:   Right upper shoulder with 2 subcutaneous masses measuring approximately 2.5 cm in diameter, these are mobile and nontender   Psychiatric: He has a normal mood and affect. His behavior is normal. Judgment and thought content normal.   Nursing note and vitals reviewed.      Assessment/Plan   Erik Arcos is here today and the following problems have been addressed:      Erik was seen today for abstract.    Diagnoses and all orders for this visit:    Prediabetes  -     Ambulatory Referral to Ophthalmology    Essential hypertension    Mixed hyperlipidemia    Gastroesophageal reflux disease with " esophagitis    Screening PSA (prostate specific antigen)  -     PSA Screen    Mass of subcutaneous tissue of back  -     Ambulatory Referral to General Surgery        Follow heart healthy/low salt/diabetic diet  Avoid processed foods  Monitor blood pressure and BS as discussed  Exercise as tolerated up to 150 minutes per week  Take all medications as prescribed  See eye doctor annually or as discussed  Wear protective foot wear/no bare feet  Check feet regularly for calluses or ulcers  Labs as noted  Refer for eye exam due to prediabetes  Refer to Dr Chang for eval of skin masses on back, suspect these are cystic in nature  Flu shot given today.      Return in about 6 months (around 6/11/2020) for Annual.      Marilyn K. Vermeesch, MD      Please note that portions of this note were completed with a voice recognition program.  Efforts were made to edit dictation, but occasionally words are mistranscribed.

## 2019-12-16 ENCOUNTER — OFFICE VISIT (OUTPATIENT)
Dept: SURGERY | Facility: CLINIC | Age: 69
End: 2019-12-16

## 2019-12-16 VITALS
HEART RATE: 89 BPM | HEIGHT: 71 IN | WEIGHT: 240 LBS | BODY MASS INDEX: 33.6 KG/M2 | OXYGEN SATURATION: 98 % | TEMPERATURE: 98.6 F | SYSTOLIC BLOOD PRESSURE: 142 MMHG | DIASTOLIC BLOOD PRESSURE: 84 MMHG

## 2019-12-16 DIAGNOSIS — D17.1 LIPOMA OF TORSO: ICD-10-CM

## 2019-12-16 DIAGNOSIS — L72.3 SEBACEOUS CYST: Primary | ICD-10-CM

## 2019-12-16 PROCEDURE — 99213 OFFICE O/P EST LOW 20 MIN: CPT | Performed by: SURGERY

## 2019-12-16 NOTE — PROGRESS NOTES
Patient: Erik Arcos    YOB: 1950    Date: 12/16/2019    Primary Care Provider: Vermeesch, Marilyn K, MD    Reason for Consultation: Lesion    Chief Complaint   Patient presents with   • Mass       Subjective .     History of present illness:  I saw the patient in the office  today as a consultation for evaluation and treatment of a mass. He complains of a mass on his upper back for the past month. He denies drainage and skin discoloration. He complains of discomfort with touch.  Mass has increased in size considerably and is very tender.  He notices 2 separate lesions.  No redness or drainage.    The following portions of the patient's history were reviewed and updated as appropriate: allergies, current medications, past family history, past medical history, past social history, past surgical history and problem list.    Review of Systems   Constitutional: Negative for chills, fever and unexpected weight change.   HENT: Negative for trouble swallowing and voice change.    Eyes: Negative for visual disturbance.   Respiratory: Negative for apnea, cough, chest tightness, shortness of breath and wheezing.    Cardiovascular: Negative for chest pain, palpitations and leg swelling.   Gastrointestinal: Negative for abdominal distention, abdominal pain, anal bleeding, blood in stool, constipation, diarrhea, nausea, rectal pain and vomiting.   Endocrine: Negative for cold intolerance and heat intolerance.   Genitourinary: Negative for difficulty urinating, dysuria, flank pain, scrotal swelling and testicular pain.   Musculoskeletal: Negative for back pain, gait problem and joint swelling.   Skin: Negative for color change, rash and wound.   Neurological: Negative for dizziness, syncope, speech difficulty, weakness, numbness and headaches.   Hematological: Negative for adenopathy. Does not bruise/bleed easily.   Psychiatric/Behavioral: Negative for confusion. The patient is not nervous/anxious.   "      History:  Past Medical History:   Diagnosis Date   • History of prediabetes    • Vitamin D deficiency        Past Surgical History:   Procedure Laterality Date   • COLONOSCOPY      Complete   • STOMACH SURGERY         History reviewed. No pertinent family history.    Social History     Tobacco Use   • Smoking status: Former Smoker   • Smokeless tobacco: Never Used   Substance Use Topics   • Alcohol use: No   • Drug use: No        Allergies:  No Known Allergies    Medications:    Current Outpatient Medications:   •  aspirin 81 MG tablet, Take  by mouth Daily., Disp: , Rfl:   •  atorvastatin (LIPITOR) 20 MG tablet, Take 1 tablet by mouth Daily., Disp: 90 tablet, Rfl: 3  •  Cholecalciferol (VITAMIN D-3) 1000 UNITS capsule, Take  by mouth Daily., Disp: , Rfl:   •  Glucosamine-Chondroitin (OSTEO BI-FLEX REGULAR STRENGTH) 250-200 MG tablet, Take  by mouth., Disp: , Rfl:   •  lisinopril (PRINIVIL,ZESTRIL) 40 MG tablet, Take 1 tablet by mouth Daily., Disp: 90 tablet, Rfl: 1  •  omeprazole (priLOSEC) 20 MG capsule, TAKE 1 CAPSULE DAILY, Disp: 90 capsule, Rfl: 3  •  sildenafil (VIAGRA) 100 MG tablet, Take 1 tablet by mouth Daily As Needed for erectile dysfunction., Disp: 25 tablet, Rfl: 1    Objective     Vital Signs:   Vitals:    12/16/19 1343   BP: 142/84   Pulse: 89   Temp: 98.6 °F (37 °C)   SpO2: 98%   Weight: 109 kg (240 lb)   Height: 180.3 cm (70.98\")       Physical Exam:  Lungs:     Clear to auscultation,respirations regular, even and                  unlabored    Heart:    Regular rhythm and normal rate, normal S1 and S2, no            murmur      General Appearance:    Alert, cooperative, in no acute distress   Head:    Normocephalic, without obvious abnormality, atraumatic   Eyes:            Lids and lashes normal, conjunctivae and sclerae normal, no   icterus, no pallor, corneas clear.   Ears:    Ears appear intact with no abnormalities noted   Throat:   No oral lesions, no thrush, oral mucosa moist   Neck:   " No adenopathy, supple, trachea midline, no thyromegaly, no   carotid bruit.   Extremities:   Moves all extremities well, no edema, no cyanosis, no             Redness.    Pulses:   Pulses palpable and equal bilaterally   Skin:   No bleeding, bruising or rash. Lesion right upper back, one measured 5 cm and one measured 3.5 cm.   Lymph nodes:   No palpable adenopathy   Neurologic:   Cranial nerves 2 - 12 grossly intact, sensation intact.     Results Review:   I reviewed the patient's new clinical results.    Review of Systems was reviewed and confirmed as accurate.    Assessment/Plan     1. Sebaceous cyst    2. Lipoma of torso        I did have a detailed and extensive discussion with the patient in the hospital and they understand that they need to undergo excision of 2 large masses on the back. Full risks and benefits of operative versus nonoperative intervention were discussed with the patient and these include bleeding, infection and reccurrence. The patient understands, agrees, and wishes to proceed with the surgical treatment plan as mentioned above. The patient had no questions for me at the end of the discussion.       I discussed the patients findings and my recommendations with patient and consulting provider.    Electronically signed by Pastor Chang MD  12/16/19  2:25 PM      Portions of this note has been scribed for Pastor Chang MD by Estella Ramírez. 12/16/2019  2:25 PM

## 2019-12-23 ENCOUNTER — PROCEDURE VISIT (OUTPATIENT)
Dept: SURGERY | Facility: CLINIC | Age: 69
End: 2019-12-23

## 2019-12-23 VITALS
DIASTOLIC BLOOD PRESSURE: 74 MMHG | OXYGEN SATURATION: 97 % | WEIGHT: 240 LBS | TEMPERATURE: 98 F | SYSTOLIC BLOOD PRESSURE: 136 MMHG | BODY MASS INDEX: 33.6 KG/M2 | HEART RATE: 91 BPM | HEIGHT: 71 IN

## 2019-12-23 DIAGNOSIS — D17.1 LIPOMA OF TORSO: Primary | ICD-10-CM

## 2019-12-23 DIAGNOSIS — IMO0002 MASS: Primary | ICD-10-CM

## 2019-12-23 PROCEDURE — 21931 EXC BACK LES SC 3 CM/>: CPT | Performed by: SURGERY

## 2019-12-23 NOTE — PROGRESS NOTES
Location: Right shoulder    Procedure: Excision 3.5 cm lipoma with layered closure      I recommend excision. Procedure and the risks and benefits were explained including bleeding and infection. The patient understands these and wishes to proceed.     The patient was brought to the procedure room. Consent and time out were performed. The area was prepped and draped in the usual fashion. 1% lidocaine with epinephrine was infused locally. An ellyptical incision was made around the lesion. Full thickness excision was performed. The lesion size was 3.5 cm. The wound was closed in layers with interrupted simple vicryl and Nylon for the skin. Wound closure size was 4.5 cm. There were no complications and the patient tolerated the procedure well. Hemostasis was well controlled with pressure and there was minimal blood loss. Wound instructions were given.

## 2019-12-30 ENCOUNTER — OFFICE VISIT (OUTPATIENT)
Dept: SURGERY | Facility: CLINIC | Age: 69
End: 2019-12-30

## 2019-12-30 VITALS
BODY MASS INDEX: 33.6 KG/M2 | HEART RATE: 84 BPM | TEMPERATURE: 97.8 F | WEIGHT: 240 LBS | SYSTOLIC BLOOD PRESSURE: 132 MMHG | OXYGEN SATURATION: 97 % | DIASTOLIC BLOOD PRESSURE: 84 MMHG | HEIGHT: 71 IN

## 2019-12-30 DIAGNOSIS — Z48.89 POSTOPERATIVE VISIT: Primary | ICD-10-CM

## 2019-12-30 PROCEDURE — 99024 POSTOP FOLLOW-UP VISIT: CPT | Performed by: SURGERY

## 2019-12-30 NOTE — PROGRESS NOTES
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  They state that they have done well and are having no problems.    Vital Signs:  Vitals:    12/30/19 1549   BP: 132/84   Pulse: 84   Temp: 97.8 °F (36.6 °C)   SpO2: 97%   Weight: 109 kg (240 lb)   Height: 180.3 cm (70.98\")       Physical Exam:   General Appearance:    Alert, cooperative, in no acute distress, wound clean dry without infection   Abdomen:     no masses, no organomegaly, soft non-tender, non-distended, no guarding, wounds are well healed   Chest:      Clear to ausculation       Assessment / Plan:    1. Postoperative visit        I did discuss the situation with the patient today in the office and they have done well from their recent lesion excision, I don't think that the patient needs any further intervention and I need to see them back only if they have further problems. Pathology report was reviewed with the patient in the office.  Patient will be scheduled for excision of a second lipoma on the back next week.    Electronically signed by Pastor Chang MD  12/30/19                    "

## 2020-01-07 NOTE — PROGRESS NOTES
Location: Upper mid back    Procedure: Excision 4 cm subcutaneous lipoma mid back with layered closure      I recommend excision. Procedure and the risks and benefits were explained including bleeding and infection. The patient understands these and wishes to proceed.     The patient was brought to the procedure room. Consent and time out were performed. The area was prepped and draped in the usual fashion. 1% lidocaine with epinephrine was infused locally. An ellyptical incision was made around the lesion. Full thickness excision was performed. The lesion size was 4 cm. The wound was closed in layers with interrupted simple vicryl and Nylon for the skin. Wound closure size was 5 cm. There were no complications and the patient tolerated the procedure well. Hemostasis was well controlled with pressure and there was minimal blood loss. Wound instructions were given.

## 2020-01-10 ENCOUNTER — PROCEDURE VISIT (OUTPATIENT)
Dept: SURGERY | Facility: CLINIC | Age: 70
End: 2020-01-10

## 2020-01-10 VITALS
HEIGHT: 71 IN | HEART RATE: 73 BPM | BODY MASS INDEX: 33.6 KG/M2 | TEMPERATURE: 97.7 F | SYSTOLIC BLOOD PRESSURE: 132 MMHG | WEIGHT: 240 LBS | DIASTOLIC BLOOD PRESSURE: 86 MMHG | OXYGEN SATURATION: 95 %

## 2020-01-10 DIAGNOSIS — D17.1 LIPOMA OF TORSO: Primary | ICD-10-CM

## 2020-01-10 PROCEDURE — 21931 EXC BACK LES SC 3 CM/>: CPT | Performed by: SURGERY

## 2020-01-13 RX ORDER — LISINOPRIL 40 MG/1
TABLET ORAL
Qty: 90 TABLET | Refills: 1 | Status: SHIPPED | OUTPATIENT
Start: 2020-01-13 | End: 2020-06-16 | Stop reason: SDUPTHER

## 2020-01-14 NOTE — PROGRESS NOTES
"Patient: Erik Arcos    YOB: 1950    Date: 01/17/2020    Primary Care Provider: Vermeesch, Marilyn K, MD    Chief Complaint   Patient presents with   • Post-op Follow-up     Excision        History of present illness:  I saw the patient in the office today as a followup from their recent excision of the back.  They state that they have done well and are having no problems.    Vital Signs:  Vitals:    01/17/20 1511   BP: 150/80   Pulse: 76   Temp: 97.6 °F (36.4 °C)   TempSrc: Temporal   SpO2: 94%   Weight: 109 kg (240 lb)   Height: 180.3 cm (71\")       Physical Exam:   General Appearance:    Alert, cooperative, in no acute distress, wound clean dry without infection   Abdomen:     no masses, no organomegaly, soft non-tender, non-distended, no guarding, wounds are well healed   Chest:      Clear to ausculation       Assessment / Plan:    1. Postoperative visit        I did discuss the situation with the patient today in the office and they have done well from their recent lesion excision, I don't think that the patient needs any further intervention and I need to see them back only if they have further problems. Pathology report was reviewed with the patient in the office.    Electronically signed by Pastor Chang MD  01/17/20        Portions of this note have been scribed for Pastor Chang MD by Mayra Velez 1/17/2020  3:21 PM                "

## 2020-01-17 ENCOUNTER — OFFICE VISIT (OUTPATIENT)
Dept: SURGERY | Facility: CLINIC | Age: 70
End: 2020-01-17

## 2020-01-17 VITALS
HEART RATE: 76 BPM | WEIGHT: 240 LBS | BODY MASS INDEX: 33.6 KG/M2 | SYSTOLIC BLOOD PRESSURE: 150 MMHG | OXYGEN SATURATION: 94 % | DIASTOLIC BLOOD PRESSURE: 80 MMHG | TEMPERATURE: 97.6 F | HEIGHT: 71 IN

## 2020-01-17 DIAGNOSIS — Z48.89 POSTOPERATIVE VISIT: Primary | ICD-10-CM

## 2020-01-17 PROCEDURE — 99024 POSTOP FOLLOW-UP VISIT: CPT | Performed by: SURGERY

## 2020-02-03 RX ORDER — SILDENAFIL 100 MG/1
TABLET, FILM COATED ORAL
Qty: 25 TABLET | Refills: 1 | Status: SHIPPED | OUTPATIENT
Start: 2020-02-03 | End: 2020-06-16 | Stop reason: SDUPTHER

## 2020-02-21 RX ORDER — OMEPRAZOLE 20 MG/1
CAPSULE, DELAYED RELEASE ORAL
Qty: 90 CAPSULE | Refills: 1 | Status: SHIPPED | OUTPATIENT
Start: 2020-02-21 | End: 2020-06-16 | Stop reason: SDUPTHER

## 2020-06-16 ENCOUNTER — OFFICE VISIT (OUTPATIENT)
Dept: INTERNAL MEDICINE | Facility: CLINIC | Age: 70
End: 2020-06-16

## 2020-06-16 VITALS
TEMPERATURE: 97.5 F | SYSTOLIC BLOOD PRESSURE: 122 MMHG | HEART RATE: 72 BPM | OXYGEN SATURATION: 98 % | WEIGHT: 238 LBS | HEIGHT: 71 IN | DIASTOLIC BLOOD PRESSURE: 80 MMHG | BODY MASS INDEX: 33.32 KG/M2

## 2020-06-16 DIAGNOSIS — E78.2 MIXED HYPERLIPIDEMIA: ICD-10-CM

## 2020-06-16 DIAGNOSIS — I10 ESSENTIAL HYPERTENSION: ICD-10-CM

## 2020-06-16 DIAGNOSIS — K21.00 GASTROESOPHAGEAL REFLUX DISEASE WITH ESOPHAGITIS: ICD-10-CM

## 2020-06-16 DIAGNOSIS — Z00.00 ENCOUNTER FOR MEDICARE ANNUAL WELLNESS EXAM: Primary | ICD-10-CM

## 2020-06-16 DIAGNOSIS — R63.5 WEIGHT GAIN: ICD-10-CM

## 2020-06-16 DIAGNOSIS — R73.03 PREDIABETES: ICD-10-CM

## 2020-06-16 DIAGNOSIS — N52.9 MALE ERECTILE DISORDER: ICD-10-CM

## 2020-06-16 PROCEDURE — G0439 PPPS, SUBSEQ VISIT: HCPCS | Performed by: INTERNAL MEDICINE

## 2020-06-16 RX ORDER — LISINOPRIL 40 MG/1
40 TABLET ORAL DAILY
Qty: 90 TABLET | Refills: 1 | Status: SHIPPED | OUTPATIENT
Start: 2020-06-16 | End: 2020-12-17 | Stop reason: SDUPTHER

## 2020-06-16 RX ORDER — OMEPRAZOLE 20 MG/1
20 CAPSULE, DELAYED RELEASE ORAL DAILY
Qty: 90 CAPSULE | Refills: 1 | Status: SHIPPED | OUTPATIENT
Start: 2020-06-16 | End: 2020-12-17 | Stop reason: SDUPTHER

## 2020-06-16 RX ORDER — ATORVASTATIN CALCIUM 20 MG/1
20 TABLET, FILM COATED ORAL DAILY
Qty: 90 TABLET | Refills: 3 | Status: SHIPPED | OUTPATIENT
Start: 2020-06-16 | End: 2021-06-01

## 2020-06-16 RX ORDER — SILDENAFIL 100 MG/1
100 TABLET, FILM COATED ORAL DAILY PRN
Qty: 25 TABLET | Refills: 1 | Status: SHIPPED | OUTPATIENT
Start: 2020-06-16 | End: 2020-12-17 | Stop reason: SDUPTHER

## 2020-06-16 NOTE — PROGRESS NOTES
The ABCs of the Annual Wellness Visit  Subsequent Medicare Wellness Visit    Chief Complaint   Patient presents with   • Medicare Wellness-subsequent       Subjective   History of Present Illness:  Erik Arcos is a 69 y.o. male who presents for a Subsequent Medicare Wellness Visit.  PMH of HTN, HLD, GERD with York's esophagus, vitamin D deficiency, prediabetes and erectile dysfunction.  His BP is well controlled.  No CP, SOA, palpitations or edema.  He denies any GERD sxs.  He takes vit D 1000 u a day.  He is trying to eat a healthy diet, avoids sweets and fast food.  He walks a lot at work and does a lot of yard work.  Last colonoscopy was 2017 with Dr Delaney.     HEALTH RISK ASSESSMENT    Recent Hospitalizations:  No hospitalization(s) within the last year.    Current Medical Providers:  Patient Care Team:  Vermeesch, Marilyn K, MD as PCP - General  Vermeesch, Marilyn K, MD as PCP - Pastor Alex MD as Consulting Physician (General Surgery)    Smoking Status:  Social History     Tobacco Use   Smoking Status Former Smoker   Smokeless Tobacco Never Used       Alcohol Consumption:  Social History     Substance and Sexual Activity   Alcohol Use No       Depression Screen:   PHQ-2/PHQ-9 Depression Screening 6/16/2020   Little interest or pleasure in doing things 0   Feeling down, depressed, or hopeless 0   Trouble falling or staying asleep, or sleeping too much -   Feeling tired or having little energy -   Poor appetite or overeating -   Feeling bad about yourself - or that you are a failure or have let yourself or your family down -   Trouble concentrating on things, such as reading the newspaper or watching television -   Moving or speaking so slowly that other people could have noticed. Or the opposite - being so fidgety or restless that you have been moving around a lot more than usual -   Thoughts that you would be better off dead, or of hurting yourself in some way -   Total Score 0    If you checked off any problems, how difficult have these problems made it for you to do your work, take care of things at home, or get along with other people? -       Fall Risk Screen:  MORGAN Fall Risk Assessment was completed, and patient is at LOW risk for falls.Assessment completed on:6/16/2020    Health Habits and Functional and Cognitive Screening:  Functional & Cognitive Status 6/16/2020   Do you have difficulty preparing food and eating? No   Do you have difficulty bathing yourself, getting dressed or grooming yourself? No   Do you have difficulty using the toilet? No   Do you have difficulty moving around from place to place? No   Do you have trouble with steps or getting out of a bed or a chair? No   Current Diet Well Balanced Diet   Dental Exam Up to date   Eye Exam Up to date   Exercise (times per week) 3 times per week   Current Exercise Activities Include Yard Work   Do you need help using the phone?  No   Are you deaf or do you have serious difficulty hearing?  No   Do you need help with transportation? No   Do you need help shopping? No   Do you need help preparing meals?  No   Do you need help with housework?  No   Do you need help with laundry? No   Do you need help taking your medications? No   Do you need help managing money? No   Do you ever drive or ride in a car without wearing a seat belt? No   Have you felt unusual stress, anger or loneliness in the last month? No   Who do you live with? Spouse   If you need help, do you have trouble finding someone available to you? No   Have you been bothered in the last four weeks by sexual problems? -   Do you have difficulty concentrating, remembering or making decisions? No         Does the patient have evidence of cognitive impairment? No    Asprin use counseling:Does not need ASA but is currently taking (advised patient that ASA is not indicated and patient chooses to stop it)    Age-appropriate Screening Schedule:  Refer to the list below for  future screening recommendations based on patient's age, sex and/or medical conditions. Orders for these recommended tests are listed in the plan section. The patient has been provided with a written plan.    Health Maintenance   Topic Date Due   • ZOSTER VACCINE (1 of 2) 11/22/1998   • LIPID PANEL  05/21/2020   • TDAP/TD VACCINES (2 - Td) 12/31/2023   • INFLUENZA VACCINE  Discontinued          The following portions of the patient's history were reviewed and updated as appropriate: allergies, current medications, past family history, past medical history, past social history, past surgical history and problem list.    Outpatient Medications Prior to Visit   Medication Sig Dispense Refill   • Cholecalciferol (VITAMIN D-3) 1000 UNITS capsule Take  by mouth Daily.     • Glucosamine-Chondroitin (OSTEO BI-FLEX REGULAR STRENGTH) 250-200 MG tablet Take  by mouth.     • aspirin 81 MG tablet Take  by mouth Daily.     • atorvastatin (LIPITOR) 20 MG tablet Take 1 tablet by mouth Daily. 90 tablet 3   • lisinopril (PRINIVIL,ZESTRIL) 40 MG tablet TAKE 1 TABLET DAILY 90 tablet 1   • omeprazole (priLOSEC) 20 MG capsule TAKE 1 CAPSULE DAILY 90 capsule 1   • sildenafil (VIAGRA) 100 MG tablet TAKE 1 TABLET DAILY AS     NEEDED FOR ERECTILE        DYSFUNCTION 25 tablet 1     No facility-administered medications prior to visit.        Patient Active Problem List   Diagnosis   • York's esophagus   • Esophageal reflux   • Hyperlipidemia   • Hypertension   • Male erectile disorder   • Prediabetes   • Vitamin D deficiency   • Encounter for screening colonoscopy   • Onychomycosis   • Screening PSA (prostate specific antigen)   • Encounter for Medicare annual wellness exam   • Mass of subcutaneous tissue of back       Advanced Care Planning:  ACP discussion was held with the patient during this visit. Patient does not have an advance directive, information provided.    Review of Systems   Constitutional: Positive for diaphoresis.   HENT:  "Negative.    Eyes: Negative.    Respiratory: Negative.    Cardiovascular: Positive for leg swelling.   Gastrointestinal: Negative.    Endocrine: Negative.    Genitourinary: Negative.    Musculoskeletal: Negative.    Skin: Negative.    Allergic/Immunologic: Positive for environmental allergies.   Neurological: Negative.    Hematological: Bruises/bleeds easily.   Psychiatric/Behavioral: Negative.        Compared to one year ago, the patient feels his physical health is the same.  Compared to one year ago, the patient feels his mental health is the same.    Reviewed chart for potential of high risk medication in the elderly: yes  Reviewed chart for potential of harmful drug interactions in the elderly:yes    Objective         Vitals:    06/16/20 1311   BP: 122/80   Pulse: 72   Temp: 97.5 °F (36.4 °C)   SpO2: 98%   Weight: 108 kg (238 lb)   Height: 180.3 cm (71\")   PainSc: 0-No pain       Body mass index is 33.19 kg/m².  Discussed the patient's BMI with him. The BMI is above average; BMI management plan is completed.    Physical Exam   Constitutional: He is oriented to person, place, and time. He appears well-developed and well-nourished. No distress.   Very pleasant and kind man, appears his age, wearing a mask, NAD   HENT:   Head: Normocephalic and atraumatic.   Right Ear: External ear normal.   Left Ear: External ear normal.   TMs normal, clean ear canals   Eyes: Pupils are equal, round, and reactive to light. Conjunctivae and EOM are normal.   Neck: Normal range of motion. Neck supple. No thyromegaly present.   Cardiovascular: Normal rate, regular rhythm, normal heart sounds and intact distal pulses.   No murmur heard.  No carotid bruits   Pulmonary/Chest: Effort normal and breath sounds normal. No respiratory distress. He has no wheezes.   Abdominal: Soft. Bowel sounds are normal. He exhibits no distension. There is no tenderness.   Obesity limits exam   Musculoskeletal: Normal range of motion. He exhibits edema. "   +1 edema in RLE at ankle   Lymphadenopathy:     He has no cervical adenopathy.   Neurological: He is alert and oriented to person, place, and time. No cranial nerve deficit. Coordination normal.   Skin: No rash noted. No erythema.   Psychiatric: He has a normal mood and affect. His behavior is normal. Judgment and thought content normal.   Nursing note and vitals reviewed.            Assessment/Plan   Medicare Risks and Personalized Health Plan  CMS Preventative Services Quick Reference  Advance Directive Discussion  Cardiovascular risk  Diabetic Lab Screening   Inactivity/Sedentary  Obesity/Overweight     The above risks/problems have been discussed with the patient.  Pertinent information has been shared with the patient in the After Visit Summary.  Follow up plans and orders are seen below in the Assessment/Plan Section.    Diagnoses and all orders for this visit:    1. Encounter for Medicare annual wellness exam (Primary)  -     CBC & Differential  -     Comprehensive Metabolic Panel  -     Hemoglobin A1c  -     Lipid Panel With / Chol / HDL Ratio  -     TSH    2. Essential hypertension  -     CBC & Differential  -     Comprehensive Metabolic Panel  -     Lipid Panel With / Chol / HDL Ratio    3. Mixed hyperlipidemia  -     CBC & Differential  -     Comprehensive Metabolic Panel  -     Lipid Panel With / Chol / HDL Ratio    4. Gastroesophageal reflux disease with esophagitis  -     CBC & Differential    5. Prediabetes  -     Comprehensive Metabolic Panel  -     Hemoglobin A1c    6. Male erectile disorder    7. Weight gain  -     TSH    Other orders  -     lisinopril (PRINIVIL,ZESTRIL) 40 MG tablet; Take 1 tablet by mouth Daily.  Dispense: 90 tablet; Refill: 1  -     omeprazole (priLOSEC) 20 MG capsule; Take 1 capsule by mouth Daily.  Dispense: 90 capsule; Refill: 1  -     atorvastatin (LIPITOR) 20 MG tablet; Take 1 tablet by mouth Daily.  Dispense: 90 tablet; Refill: 3  -     sildenafil (VIAGRA) 100 MG tablet;  Take 1 tablet by mouth Daily As Needed for Erectile Dysfunction.  Dispense: 25 tablet; Refill: 1    Labs as noted  Copy of living will when completed  He does not want shingles vaccine, too expensive  Check TSH due to diaphoresis   Continue omeprazole for GERD  Follow heart healthy/low cholesterol/low salt diet  Avoid processed/fried foods/fast food  Exercise as tolerated up to 30 minutes 5 days a week-work on portion sizes to help weight management  Take all medications as prescribed    Follow Up:  Return in about 6 months (around 12/16/2020) for Next scheduled follow up.     An After Visit Summary and PPPS were given to the patient.

## 2020-06-17 LAB
ALBUMIN SERPL-MCNC: 5 G/DL (ref 3.5–5.2)
ALBUMIN/GLOB SERPL: 2.6 G/DL
ALP SERPL-CCNC: 59 U/L (ref 39–117)
ALT SERPL-CCNC: 23 U/L (ref 1–41)
AST SERPL-CCNC: 19 U/L (ref 1–40)
BASOPHILS # BLD AUTO: 0.04 10*3/MM3 (ref 0–0.2)
BASOPHILS NFR BLD AUTO: 0.5 % (ref 0–1.5)
BILIRUB SERPL-MCNC: 0.6 MG/DL (ref 0.2–1.2)
BUN SERPL-MCNC: 14 MG/DL (ref 8–23)
BUN/CREAT SERPL: 16.3 (ref 7–25)
CALCIUM SERPL-MCNC: 9.7 MG/DL (ref 8.6–10.5)
CHLORIDE SERPL-SCNC: 99 MMOL/L (ref 98–107)
CHOLEST SERPL-MCNC: 203 MG/DL (ref 0–200)
CHOLEST/HDLC SERPL: 2.71 {RATIO}
CO2 SERPL-SCNC: 27.9 MMOL/L (ref 22–29)
CREAT SERPL-MCNC: 0.86 MG/DL (ref 0.76–1.27)
EOSINOPHIL # BLD AUTO: 0.15 10*3/MM3 (ref 0–0.4)
EOSINOPHIL NFR BLD AUTO: 1.9 % (ref 0.3–6.2)
ERYTHROCYTE [DISTWIDTH] IN BLOOD BY AUTOMATED COUNT: 13 % (ref 12.3–15.4)
GLOBULIN SER CALC-MCNC: 1.9 GM/DL
GLUCOSE SERPL-MCNC: 123 MG/DL (ref 65–99)
HBA1C MFR BLD: 6 % (ref 4.8–5.6)
HCT VFR BLD AUTO: 42.3 % (ref 37.5–51)
HDLC SERPL-MCNC: 75 MG/DL (ref 40–60)
HGB BLD-MCNC: 14.2 G/DL (ref 13–17.7)
IMM GRANULOCYTES # BLD AUTO: 0.03 10*3/MM3 (ref 0–0.05)
IMM GRANULOCYTES NFR BLD AUTO: 0.4 % (ref 0–0.5)
LDLC SERPL CALC-MCNC: 112 MG/DL (ref 0–100)
LYMPHOCYTES # BLD AUTO: 1.27 10*3/MM3 (ref 0.7–3.1)
LYMPHOCYTES NFR BLD AUTO: 16.2 % (ref 19.6–45.3)
MCH RBC QN AUTO: 29.2 PG (ref 26.6–33)
MCHC RBC AUTO-ENTMCNC: 33.6 G/DL (ref 31.5–35.7)
MCV RBC AUTO: 87 FL (ref 79–97)
MONOCYTES # BLD AUTO: 0.45 10*3/MM3 (ref 0.1–0.9)
MONOCYTES NFR BLD AUTO: 5.7 % (ref 5–12)
NEUTROPHILS # BLD AUTO: 5.89 10*3/MM3 (ref 1.7–7)
NEUTROPHILS NFR BLD AUTO: 75.3 % (ref 42.7–76)
NRBC BLD AUTO-RTO: 0 /100 WBC (ref 0–0.2)
PLATELET # BLD AUTO: 224 10*3/MM3 (ref 140–450)
POTASSIUM SERPL-SCNC: 5 MMOL/L (ref 3.5–5.2)
PROT SERPL-MCNC: 6.9 G/DL (ref 6–8.5)
RBC # BLD AUTO: 4.86 10*6/MM3 (ref 4.14–5.8)
SODIUM SERPL-SCNC: 138 MMOL/L (ref 136–145)
TRIGL SERPL-MCNC: 79 MG/DL (ref 0–150)
TSH SERPL DL<=0.005 MIU/L-ACNC: 3.07 UIU/ML (ref 0.27–4.2)
VLDLC SERPL CALC-MCNC: 15.8 MG/DL
WBC # BLD AUTO: 7.83 10*3/MM3 (ref 3.4–10.8)

## 2020-12-17 ENCOUNTER — OFFICE VISIT (OUTPATIENT)
Dept: INTERNAL MEDICINE | Facility: CLINIC | Age: 70
End: 2020-12-17

## 2020-12-17 VITALS
TEMPERATURE: 98.2 F | DIASTOLIC BLOOD PRESSURE: 74 MMHG | BODY MASS INDEX: 31.22 KG/M2 | HEIGHT: 71 IN | OXYGEN SATURATION: 98 % | SYSTOLIC BLOOD PRESSURE: 116 MMHG | WEIGHT: 223 LBS | HEART RATE: 84 BPM

## 2020-12-17 DIAGNOSIS — E78.2 MIXED HYPERLIPIDEMIA: ICD-10-CM

## 2020-12-17 DIAGNOSIS — K22.70 BARRETT'S ESOPHAGUS WITHOUT DYSPLASIA: ICD-10-CM

## 2020-12-17 DIAGNOSIS — I10 ESSENTIAL HYPERTENSION: Primary | ICD-10-CM

## 2020-12-17 DIAGNOSIS — R73.03 PREDIABETES: ICD-10-CM

## 2020-12-17 PROBLEM — B35.1 ONYCHOMYCOSIS: Status: RESOLVED | Noted: 2017-04-06 | Resolved: 2020-12-17

## 2020-12-17 PROBLEM — R22.2 MASS OF SUBCUTANEOUS TISSUE OF BACK: Status: RESOLVED | Noted: 2019-12-11 | Resolved: 2020-12-17

## 2020-12-17 PROCEDURE — 90694 VACC AIIV4 NO PRSRV 0.5ML IM: CPT | Performed by: INTERNAL MEDICINE

## 2020-12-17 PROCEDURE — 99214 OFFICE O/P EST MOD 30 MIN: CPT | Performed by: INTERNAL MEDICINE

## 2020-12-17 PROCEDURE — G0008 ADMIN INFLUENZA VIRUS VAC: HCPCS | Performed by: INTERNAL MEDICINE

## 2020-12-17 RX ORDER — OMEPRAZOLE 20 MG/1
20 CAPSULE, DELAYED RELEASE ORAL DAILY
Qty: 90 CAPSULE | Refills: 1 | Status: SHIPPED | OUTPATIENT
Start: 2020-12-17 | End: 2021-06-17 | Stop reason: SDUPTHER

## 2020-12-17 RX ORDER — SILDENAFIL 100 MG/1
100 TABLET, FILM COATED ORAL DAILY PRN
Qty: 25 TABLET | Refills: 1 | Status: SHIPPED | OUTPATIENT
Start: 2020-12-17 | End: 2021-06-17 | Stop reason: SDUPTHER

## 2020-12-17 RX ORDER — LISINOPRIL 40 MG/1
40 TABLET ORAL DAILY
Qty: 90 TABLET | Refills: 1 | Status: SHIPPED | OUTPATIENT
Start: 2020-12-17 | End: 2021-06-16

## 2020-12-17 NOTE — PROGRESS NOTES
"Chief Complaint   Patient presents with   • Follow-up     hypertension     Subjective   Erik Arcos is a 70 y.o. male.     Here for follow-up of HTN, HLD, GERD, ED, preDM.   HTN/HLD- BP is well controlled today, he does not check it.  No CP, palpitations, SOA.  Some edema at times. He takes his lipitor every night.  He has been staying home, not eating out as much.  He works as a  and is active walking.  He has lost 15 pounds over past 6 months since our last visit  PreDM- he is avoiding sweets.  A1c 6 six month ago.  Last eye exam was over a yr ago  GERD with Barretts-he remains on omeprazole for GERD, well controlled.  EGD in Nov reveals ongoing Barretts.   Vit d def- he remains on vit D 1000 u  ED- he uses a whole tab of viagra and it works well.  He awakens about 1-2 times a night to urinate.  No hesitancy or dribbling  HCM- colonoscopy done per Dr Delaney June 2017 next one due 2022.  He did get Hep A vaccine, trying to get shingrix vaccine.  His pneumovax series is UTD.  He will get flu vaccine today.        The following portions of the patient's history were reviewed and updated as appropriate: allergies, current medications, past family history, past medical history, past social history, past surgical history and problem list.    Review of Systems   Constitutional: Negative for activity change, appetite change and unexpected weight change.   Eyes: Negative for visual disturbance.   Respiratory: Negative for shortness of breath.    Cardiovascular: Positive for leg swelling. Negative for chest pain and palpitations.   Gastrointestinal: Negative for abdominal pain.   Genitourinary: Negative for difficulty urinating and frequency.   Neurological: Negative for headaches.   Psychiatric/Behavioral: Negative for dysphoric mood and sleep disturbance. The patient is not nervous/anxious.        Objective   /74   Pulse 84   Temp 98.2 °F (36.8 °C)   Ht 180.3 cm (70.98\")   Wt 101 kg (223 lb)  "  SpO2 98%   BMI 31.12 kg/m²   Body mass index is 31.12 kg/m².  Physical Exam  Vitals signs and nursing note reviewed.   Constitutional:       General: He is not in acute distress.     Appearance: Normal appearance. He is well-developed. He is not ill-appearing.      Comments: Very kind and pleasant man, appears his age and is in no distress today   HENT:      Head: Normocephalic and atraumatic.      Right Ear: External ear normal.      Left Ear: External ear normal.   Eyes:      General:         Right eye: No discharge.         Left eye: No discharge.      Extraocular Movements: Extraocular movements intact.      Conjunctiva/sclera: Conjunctivae normal.      Pupils: Pupils are equal, round, and reactive to light.   Neck:      Musculoskeletal: Normal range of motion and neck supple.      Thyroid: No thyromegaly.   Cardiovascular:      Rate and Rhythm: Normal rate and regular rhythm.      Pulses: Normal pulses.      Heart sounds: Normal heart sounds. No murmur.      Comments: No carotid bruits  Pulmonary:      Effort: Pulmonary effort is normal. No respiratory distress.      Breath sounds: Normal breath sounds. No wheezing.   Abdominal:      General: Bowel sounds are normal. There is no distension.      Palpations: Abdomen is soft.      Tenderness: There is no abdominal tenderness. There is no guarding or rebound.   Musculoskeletal: Normal range of motion.      Right lower leg: No edema.      Left lower leg: No edema.   Lymphadenopathy:      Cervical: No cervical adenopathy.   Neurological:      General: No focal deficit present.      Mental Status: He is alert and oriented to person, place, and time. Mental status is at baseline.      Cranial Nerves: No cranial nerve deficit.      Motor: No weakness.      Coordination: Coordination normal.   Psychiatric:         Mood and Affect: Mood normal.         Behavior: Behavior normal.         Thought Content: Thought content normal.         Judgment: Judgment normal.          Assessment/Plan   Erik Arcos is here today and the following problems have been addressed:      Diagnoses and all orders for this visit:    1. Essential hypertension (Primary)    2. Mixed hyperlipidemia    3. Prediabetes  -     Hemoglobin A1c  -     Comprehensive Metabolic Panel    4. York's esophagus without dysplasia    Other orders  -     lisinopril (PRINIVIL,ZESTRIL) 40 MG tablet; Take 1 tablet by mouth Daily.  Dispense: 90 tablet; Refill: 1  -     sildenafil (VIAGRA) 100 MG tablet; Take 1 tablet by mouth Daily As Needed for Erectile Dysfunction.  Dispense: 25 tablet; Refill: 1  -     omeprazole (priLOSEC) 20 MG capsule; Take 1 capsule by mouth Daily.  Dispense: 90 capsule; Refill: 1        Follow heart healthy/low salt/diabetic diet  Avoid processed foods  Monitor blood pressure and BS as discussed  He is very active at work and walks almost his entire 8-hour shift, has lost approximately 15 pounds in the last 6 weeks  Take all medications as prescribed  See eye doctor annually or as discussed  Wear protective foot wear/no bare feet  Check feet regularly for calluses or ulcers  Labs as noted  Continue atorvastatin for hyperlipidemia  Continue lisinopril, blood pressure is well controlled  Continue current dose of omeprazole, recent EGD continue to show symptoms of York's esophagus  Use Viagra as needed for ED symptoms      Return in about 6 months (around 6/17/2021) for Medicare Wellness.      Marilyn K. Vermeesch, MD      Please note that portions of this note were completed with a voice recognition program.  Efforts were made to edit dictation, but occasionally words are mistranscribed.

## 2020-12-18 LAB
ALBUMIN SERPL-MCNC: 4.6 G/DL (ref 3.5–5.2)
ALBUMIN/GLOB SERPL: 2.1 G/DL
ALP SERPL-CCNC: 56 U/L (ref 39–117)
ALT SERPL-CCNC: 13 U/L (ref 1–41)
AST SERPL-CCNC: 17 U/L (ref 1–40)
BILIRUB SERPL-MCNC: 0.7 MG/DL (ref 0–1.2)
BUN SERPL-MCNC: 14 MG/DL (ref 8–23)
BUN/CREAT SERPL: 17.1 (ref 7–25)
CALCIUM SERPL-MCNC: 10.1 MG/DL (ref 8.6–10.5)
CHLORIDE SERPL-SCNC: 103 MMOL/L (ref 98–107)
CO2 SERPL-SCNC: 28.5 MMOL/L (ref 22–29)
CREAT SERPL-MCNC: 0.82 MG/DL (ref 0.76–1.27)
GLOBULIN SER CALC-MCNC: 2.2 GM/DL
GLUCOSE SERPL-MCNC: 108 MG/DL (ref 65–99)
HBA1C MFR BLD: 5.5 % (ref 4.8–5.6)
POTASSIUM SERPL-SCNC: 5 MMOL/L (ref 3.5–5.2)
PROT SERPL-MCNC: 6.8 G/DL (ref 6–8.5)
SODIUM SERPL-SCNC: 141 MMOL/L (ref 136–145)

## 2021-06-01 RX ORDER — ATORVASTATIN CALCIUM 20 MG/1
TABLET, FILM COATED ORAL
Qty: 90 TABLET | Refills: 3 | Status: SHIPPED | OUTPATIENT
Start: 2021-06-01 | End: 2022-05-16

## 2021-06-16 RX ORDER — LISINOPRIL 40 MG/1
TABLET ORAL
Qty: 90 TABLET | Refills: 1 | Status: SHIPPED | OUTPATIENT
Start: 2021-06-16 | End: 2021-12-16 | Stop reason: SDUPTHER

## 2021-06-17 ENCOUNTER — OFFICE VISIT (OUTPATIENT)
Dept: INTERNAL MEDICINE | Facility: CLINIC | Age: 71
End: 2021-06-17

## 2021-06-17 VITALS
DIASTOLIC BLOOD PRESSURE: 80 MMHG | BODY MASS INDEX: 32.62 KG/M2 | HEART RATE: 81 BPM | OXYGEN SATURATION: 97 % | HEIGHT: 71 IN | SYSTOLIC BLOOD PRESSURE: 132 MMHG | TEMPERATURE: 97 F | WEIGHT: 233 LBS

## 2021-06-17 DIAGNOSIS — E66.09 CLASS 1 OBESITY DUE TO EXCESS CALORIES WITH SERIOUS COMORBIDITY AND BODY MASS INDEX (BMI) OF 32.0 TO 32.9 IN ADULT: ICD-10-CM

## 2021-06-17 DIAGNOSIS — K21.00 GASTROESOPHAGEAL REFLUX DISEASE WITH ESOPHAGITIS, UNSPECIFIED WHETHER HEMORRHAGE: ICD-10-CM

## 2021-06-17 DIAGNOSIS — E78.2 MIXED HYPERLIPIDEMIA: ICD-10-CM

## 2021-06-17 DIAGNOSIS — E55.9 VITAMIN D DEFICIENCY: ICD-10-CM

## 2021-06-17 DIAGNOSIS — I10 ESSENTIAL HYPERTENSION: ICD-10-CM

## 2021-06-17 DIAGNOSIS — Z12.5 SCREENING PSA (PROSTATE SPECIFIC ANTIGEN): ICD-10-CM

## 2021-06-17 DIAGNOSIS — R73.03 PREDIABETES: ICD-10-CM

## 2021-06-17 DIAGNOSIS — Z00.00 ENCOUNTER FOR MEDICARE ANNUAL WELLNESS EXAM: Primary | ICD-10-CM

## 2021-06-17 PROBLEM — E66.811 CLASS 1 OBESITY DUE TO EXCESS CALORIES WITH SERIOUS COMORBIDITY AND BODY MASS INDEX (BMI) OF 32.0 TO 32.9 IN ADULT: Status: ACTIVE | Noted: 2021-06-17

## 2021-06-17 PROCEDURE — G0439 PPPS, SUBSEQ VISIT: HCPCS | Performed by: INTERNAL MEDICINE

## 2021-06-17 RX ORDER — SILDENAFIL 100 MG/1
100 TABLET, FILM COATED ORAL DAILY PRN
Qty: 25 TABLET | Refills: 1 | Status: SHIPPED | OUTPATIENT
Start: 2021-06-17 | End: 2021-12-16 | Stop reason: SDUPTHER

## 2021-06-17 RX ORDER — OMEPRAZOLE 20 MG/1
20 CAPSULE, DELAYED RELEASE ORAL DAILY
Qty: 90 CAPSULE | Refills: 1 | Status: SHIPPED | OUTPATIENT
Start: 2021-06-17 | End: 2021-12-16 | Stop reason: SDUPTHER

## 2021-06-17 NOTE — PROGRESS NOTES
The ABCs of the Annual Wellness Visit  Subsequent Medicare Wellness Visit    Chief Complaint   Patient presents with   • Medicare Wellness-subsequent       Subjective   History of Present Illness:  Erik Arcos is a 70 y.o. male who presents for a Subsequent Medicare Wellness Visit.  PMH of HTN, HLD, prediabetes, vitamin D deficiency, GERD with York's esophagus and erectile dysfunction.  BP and heart rate are well controlled today.  He is compliant with medications including atorvastatin.  He does his best to follow a heart healthy diet and avoid sweets.  He is compliant with vitamin D 1000 units daily.  He takes his omeprazole before all other medications with water only.  Denies any current GERD symptoms.  He continues to works as a  and walks most of the shift. He denies any CP or SOA.  Has gained some weight over past 6 mo.  He is going to eat smaller portions and drink less soda.  He needs to set up appt for eye doctor, he has dentures and does not need to be seen any further.     HEALTH RISK ASSESSMENT    Recent Hospitalizations:  No hospitalization(s) within the last year.    Current Medical Providers:  Patient Care Team:  Vermeesch, Marilyn K, MD as PCP - General (Internal Medicine & Pediatrics)  Pastor Chang MD as Consulting Physician (General Surgery)    Smoking Status:  Social History     Tobacco Use   Smoking Status Former Smoker   Smokeless Tobacco Never Used       Alcohol Consumption:  Social History     Substance and Sexual Activity   Alcohol Use No       Depression Screen:   PHQ-2/PHQ-9 Depression Screening 6/17/2021   Little interest or pleasure in doing things 0   Feeling down, depressed, or hopeless 0   Trouble falling or staying asleep, or sleeping too much -   Feeling tired or having little energy -   Poor appetite or overeating -   Feeling bad about yourself - or that you are a failure or have let yourself or your family down -   Trouble concentrating on things, such as  reading the newspaper or watching television -   Moving or speaking so slowly that other people could have noticed. Or the opposite - being so fidgety or restless that you have been moving around a lot more than usual -   Thoughts that you would be better off dead, or of hurting yourself in some way -   Total Score 0   If you checked off any problems, how difficult have these problems made it for you to do your work, take care of things at home, or get along with other people? -       Fall Risk Screen:  MORGAN Fall Risk Assessment was completed, and patient is at LOW risk for falls.Assessment completed on:6/17/2021    Health Habits and Functional and Cognitive Screening:  Functional & Cognitive Status 6/17/2021   Do you have difficulty preparing food and eating? No   Do you have difficulty bathing yourself, getting dressed or grooming yourself? No   Do you have difficulty using the toilet? No   Do you have difficulty moving around from place to place? No   Do you have trouble with steps or getting out of a bed or a chair? No   Current Diet Limited Junk Food   Dental Exam Not up to date   Eye Exam Not up to date   Exercise (times per week) 0 times per week   Current Exercises Include No Regular Exercise   Current Exercise Activities Include -   Do you need help using the phone?  No   Are you deaf or do you have serious difficulty hearing?  No   Do you need help with transportation? No   Do you need help shopping? No   Do you need help preparing meals?  No   Do you need help with housework?  No   Do you need help with laundry? No   Do you need help taking your medications? No   Do you need help managing money? No   Do you ever drive or ride in a car without wearing a seat belt? No   Have you felt unusual stress, anger or loneliness in the last month? No   Who do you live with? Spouse   If you need help, do you have trouble finding someone available to you? No   Have you been bothered in the last four weeks by sexual  problems? No   Do you have difficulty concentrating, remembering or making decisions? No         Does the patient have evidence of cognitive impairment? No    Asprin use counseling:Does not need ASA (and currently is not on it)    Age-appropriate Screening Schedule:  Refer to the list below for future screening recommendations based on patient's age, sex and/or medical conditions. Orders for these recommended tests are listed in the plan section. The patient has been provided with a written plan.    Health Maintenance   Topic Date Due   • LIPID PANEL  06/16/2021   • ZOSTER VACCINE (1 of 2) 12/26/2021 (Originally 11/22/1998)   • TDAP/TD VACCINES (2 - Td or Tdap) 12/31/2023   • INFLUENZA VACCINE  Discontinued          The following portions of the patient's history were reviewed and updated as appropriate: allergies, current medications, past family history, past medical history, past social history, past surgical history and problem list.    Outpatient Medications Prior to Visit   Medication Sig Dispense Refill   • atorvastatin (LIPITOR) 20 MG tablet TAKE 1 TABLET DAILY 90 tablet 3   • Cholecalciferol (VITAMIN D-3) 1000 UNITS capsule Take  by mouth Daily.     • lisinopril (PRINIVIL,ZESTRIL) 40 MG tablet TAKE 1 TABLET DAILY 90 tablet 1   • omeprazole (priLOSEC) 20 MG capsule Take 1 capsule by mouth Daily. 90 capsule 1   • sildenafil (VIAGRA) 100 MG tablet Take 1 tablet by mouth Daily As Needed for Erectile Dysfunction. 25 tablet 1     No facility-administered medications prior to visit.       Patient Active Problem List   Diagnosis   • York's esophagus   • Esophageal reflux   • Hyperlipidemia   • Hypertension   • Male erectile disorder   • Prediabetes   • Vitamin D deficiency   • Encounter for screening colonoscopy   • Screening PSA (prostate specific antigen)   • Encounter for Medicare annual wellness exam   • Class 1 obesity due to excess calories with serious comorbidity and body mass index (BMI) of 32.0 to 32.9  "in adult       Advanced Care Planning:  ACP discussion was held with the patient during this visit. Patient does not have an advance directive, information provided.    Review of Systems   Constitutional: Positive for unexpected weight change.        10 lbs weight gain over past 6 mo   HENT: Negative.    Eyes: Negative.    Respiratory: Negative.    Cardiovascular: Negative.    Gastrointestinal: Negative.    Endocrine: Negative.    Genitourinary: Negative.    Musculoskeletal: Negative.    Skin: Negative.    Allergic/Immunologic: Negative.    Neurological: Negative.    Hematological: Bruises/bleeds easily.   Psychiatric/Behavioral: Negative.        Compared to one year ago, the patient feels his physical health is the same.  Compared to one year ago, the patient feels his mental health is the same.    Reviewed chart for potential of high risk medication in the elderly: yes  Reviewed chart for potential of harmful drug interactions in the elderly:yes    Objective         Vitals:    06/17/21 1524   BP: 132/80   Pulse: 81   Temp: 97 °F (36.1 °C)   SpO2: 97%   Weight: 106 kg (233 lb)   Height: 180.3 cm (70.98\")   PainSc: 0-No pain       Body mass index is 32.52 kg/m².  Discussed the patient's BMI with him. The BMI is above average; BMI management plan is completed.    Physical Exam  Vitals and nursing note reviewed.   Constitutional:       General: He is not in acute distress.     Appearance: Normal appearance. He is well-developed. He is obese. He is not ill-appearing.      Comments: Kind and pleasant man, appears stated age and in NAD today   HENT:      Head: Normocephalic and atraumatic.      Right Ear: Tympanic membrane, ear canal and external ear normal.      Left Ear: Tympanic membrane, ear canal and external ear normal.   Eyes:      General:         Right eye: No discharge.         Left eye: No discharge.      Extraocular Movements: Extraocular movements intact.      Conjunctiva/sclera: Conjunctivae normal.      " Pupils: Pupils are equal, round, and reactive to light.   Neck:      Thyroid: No thyromegaly.      Vascular: No carotid bruit.      Comments: No thyromegaly or mass  Cardiovascular:      Rate and Rhythm: Normal rate and regular rhythm.      Pulses: Normal pulses.      Heart sounds: Normal heart sounds. No murmur heard.     Pulmonary:      Effort: Pulmonary effort is normal. No respiratory distress.      Breath sounds: Normal breath sounds. No wheezing.   Abdominal:      General: Bowel sounds are normal. There is no distension.      Palpations: Abdomen is soft.      Tenderness: There is no abdominal tenderness.      Comments: Obesity limits exam   Musculoskeletal:         General: Normal range of motion.      Cervical back: Normal range of motion and neck supple.      Right lower leg: No edema.      Left lower leg: No edema.   Lymphadenopathy:      Cervical: No cervical adenopathy.   Skin:     General: Skin is warm.      Findings: No rash.   Neurological:      General: No focal deficit present.      Mental Status: He is alert and oriented to person, place, and time. Mental status is at baseline.      Cranial Nerves: No cranial nerve deficit.      Motor: No weakness.      Coordination: Coordination normal.      Gait: Gait normal.   Psychiatric:         Mood and Affect: Mood normal.         Behavior: Behavior normal.         Thought Content: Thought content normal.         Judgment: Judgment normal.               Assessment/Plan   Medicare Risks and Personalized Health Plan  CMS Preventative Services Quick Reference  Advance Directive Discussion  Cardiovascular risk  Diabetic Lab Screening   Obesity/Overweight   Prostate Cancer Screening     The above risks/problems have been discussed with the patient.  Pertinent information has been shared with the patient in the After Visit Summary.  Follow up plans and orders are seen below in the Assessment/Plan Section.    Diagnoses and all orders for this visit:    1. Encounter  for Medicare annual wellness exam (Primary)  -     CBC & Differential  -     Comprehensive Metabolic Panel  -     Hemoglobin A1c  -     Lipid Panel With / Chol / HDL Ratio  -     PSA Screen  -     [place] AAA Screen Limited; Future    2. Essential hypertension  -     CBC & Differential  -     Comprehensive Metabolic Panel  -     US AAA Screen Limited; Future    3. Mixed hyperlipidemia  -     Comprehensive Metabolic Panel  -     Lipid Panel With / Chol / HDL Ratio  -     [place] AAA Screen Limited; Future    4. Prediabetes  -     Comprehensive Metabolic Panel  -     Hemoglobin A1c  -     Ambulatory Referral for Diabetic Eye Exam-Optometry    5. Gastroesophageal reflux disease with esophagitis, unspecified whether hemorrhage  -     CBC & Differential    6. Vitamin D deficiency    7. Screening PSA (prostate specific antigen)  -     PSA Screen    8. Class 1 obesity due to excess calories with serious comorbidity and body mass index (BMI) of 32.0 to 32.9 in adult    Other orders  -     omeprazole (priLOSEC) 20 MG capsule; Take 1 capsule by mouth Daily.  Dispense: 90 capsule; Refill: 1  -     sildenafil (VIAGRA) 100 MG tablet; Take 1 tablet by mouth Daily As Needed for Erectile Dysfunction.  Dispense: 25 tablet; Refill: 1    Follow heart healthy/low salt diet  Avoid processed foods  Monitor blood pressure as discussed  Exercise as tolerated up to 30 minutes 5 days per week  Take all medications as prescribed  Labs as noted  Encouraged him to complete living will and drop off copy when completed   Continue omeprazole, GERD is well controlled  Continue daily vitamin D  Abdominal ultrasound of aorta ordered due to history of smoking, hypertension and hyperlipidemia  Consider shingles vaccine at local pharmacy  Patient's Body mass index is 32.52 kg/m². indicating that he is obese (BMI >30). Obesity-related health conditions include the following: hypertension, dyslipidemias and GERD. Obesity is worsening. BMI is is above average; BMI  management plan is completed. We discussed portion control and increasing exercise.      Follow Up:  Return in about 6 months (around 12/17/2021) for Next scheduled follow up.     An After Visit Summary and PPPS were given to the patient.

## 2021-06-18 LAB
ALBUMIN SERPL-MCNC: 4.9 G/DL (ref 3.5–5.2)
ALBUMIN/GLOB SERPL: 2.6 G/DL
ALP SERPL-CCNC: 59 U/L (ref 39–117)
ALT SERPL-CCNC: 15 U/L (ref 1–41)
AST SERPL-CCNC: 17 U/L (ref 1–40)
BASOPHILS # BLD AUTO: 0.04 10*3/MM3 (ref 0–0.2)
BASOPHILS NFR BLD AUTO: 0.6 % (ref 0–1.5)
BILIRUB SERPL-MCNC: 0.7 MG/DL (ref 0–1.2)
BUN SERPL-MCNC: 12 MG/DL (ref 8–23)
BUN/CREAT SERPL: 15.2 (ref 7–25)
CALCIUM SERPL-MCNC: 9.9 MG/DL (ref 8.6–10.5)
CHLORIDE SERPL-SCNC: 98 MMOL/L (ref 98–107)
CHOLEST SERPL-MCNC: 181 MG/DL (ref 0–200)
CHOLEST/HDLC SERPL: 2.35 {RATIO}
CO2 SERPL-SCNC: 28.3 MMOL/L (ref 22–29)
CREAT SERPL-MCNC: 0.79 MG/DL (ref 0.76–1.27)
EOSINOPHIL # BLD AUTO: 0.1 10*3/MM3 (ref 0–0.4)
EOSINOPHIL NFR BLD AUTO: 1.5 % (ref 0.3–6.2)
ERYTHROCYTE [DISTWIDTH] IN BLOOD BY AUTOMATED COUNT: 13.3 % (ref 12.3–15.4)
GLOBULIN SER CALC-MCNC: 1.9 GM/DL
GLUCOSE SERPL-MCNC: 110 MG/DL (ref 65–99)
HBA1C MFR BLD: 5.5 % (ref 4.8–5.6)
HCT VFR BLD AUTO: 44 % (ref 37.5–51)
HDLC SERPL-MCNC: 77 MG/DL (ref 40–60)
HGB BLD-MCNC: 14.6 G/DL (ref 13–17.7)
IMM GRANULOCYTES # BLD AUTO: 0.01 10*3/MM3 (ref 0–0.05)
IMM GRANULOCYTES NFR BLD AUTO: 0.2 % (ref 0–0.5)
LDLC SERPL CALC-MCNC: 89 MG/DL (ref 0–100)
LYMPHOCYTES # BLD AUTO: 1.46 10*3/MM3 (ref 0.7–3.1)
LYMPHOCYTES NFR BLD AUTO: 22.5 % (ref 19.6–45.3)
MCH RBC QN AUTO: 29.2 PG (ref 26.6–33)
MCHC RBC AUTO-ENTMCNC: 33.2 G/DL (ref 31.5–35.7)
MCV RBC AUTO: 88 FL (ref 79–97)
MONOCYTES # BLD AUTO: 0.47 10*3/MM3 (ref 0.1–0.9)
MONOCYTES NFR BLD AUTO: 7.3 % (ref 5–12)
NEUTROPHILS # BLD AUTO: 4.4 10*3/MM3 (ref 1.7–7)
NEUTROPHILS NFR BLD AUTO: 67.9 % (ref 42.7–76)
NRBC BLD AUTO-RTO: 0.2 /100 WBC (ref 0–0.2)
PLATELET # BLD AUTO: 246 10*3/MM3 (ref 140–450)
POTASSIUM SERPL-SCNC: 5.1 MMOL/L (ref 3.5–5.2)
PROT SERPL-MCNC: 6.8 G/DL (ref 6–8.5)
PSA SERPL-MCNC: 3.71 NG/ML (ref 0–4)
RBC # BLD AUTO: 5 10*6/MM3 (ref 4.14–5.8)
SODIUM SERPL-SCNC: 137 MMOL/L (ref 136–145)
TRIGL SERPL-MCNC: 82 MG/DL (ref 0–150)
VLDLC SERPL CALC-MCNC: 15 MG/DL (ref 5–40)
WBC # BLD AUTO: 6.48 10*3/MM3 (ref 3.4–10.8)

## 2021-07-07 ENCOUNTER — HOSPITAL ENCOUNTER (OUTPATIENT)
Dept: ULTRASOUND IMAGING | Facility: HOSPITAL | Age: 71
Discharge: HOME | End: 2021-07-07
Admitting: INTERNAL MEDICINE

## 2021-07-07 DIAGNOSIS — E78.2 MIXED HYPERLIPIDEMIA: ICD-10-CM

## 2021-07-07 DIAGNOSIS — Z00.00 ENCOUNTER FOR MEDICARE ANNUAL WELLNESS EXAM: ICD-10-CM

## 2021-07-07 DIAGNOSIS — I10 ESSENTIAL HYPERTENSION: ICD-10-CM

## 2021-07-07 PROCEDURE — 76706 US ABDL AORTA SCREEN AAA: CPT

## 2021-09-16 ENCOUNTER — OFFICE VISIT (OUTPATIENT)
Dept: CARDIOLOGY | Facility: HOSPITAL | Age: 71
End: 2021-09-16

## 2021-09-16 ENCOUNTER — HOSPITAL ENCOUNTER (OUTPATIENT)
Dept: CARDIOLOGY | Facility: HOSPITAL | Age: 71
Discharge: HOME | End: 2021-09-16

## 2021-09-16 VITALS
HEART RATE: 97 BPM | HEIGHT: 71 IN | WEIGHT: 237 LBS | TEMPERATURE: 97.1 F | BODY MASS INDEX: 33.18 KG/M2 | DIASTOLIC BLOOD PRESSURE: 63 MMHG | SYSTOLIC BLOOD PRESSURE: 148 MMHG | RESPIRATION RATE: 23 BRPM | OXYGEN SATURATION: 97 %

## 2021-09-16 DIAGNOSIS — E66.9 OBESITY (BMI 30-39.9): ICD-10-CM

## 2021-09-16 DIAGNOSIS — E78.5 HYPERLIPIDEMIA, UNSPECIFIED HYPERLIPIDEMIA TYPE: ICD-10-CM

## 2021-09-16 DIAGNOSIS — I10 ESSENTIAL HYPERTENSION: ICD-10-CM

## 2021-09-16 DIAGNOSIS — R06.83 SNORING: ICD-10-CM

## 2021-09-16 DIAGNOSIS — I48.91 NEW ONSET ATRIAL FIBRILLATION (HCC): Primary | ICD-10-CM

## 2021-09-16 DIAGNOSIS — R06.09 DOE (DYSPNEA ON EXERTION): ICD-10-CM

## 2021-09-16 DIAGNOSIS — R00.2 PALPITATIONS: ICD-10-CM

## 2021-09-16 LAB
QT INTERVAL: 348 MS
QTC INTERVAL: 446 MS

## 2021-09-16 PROCEDURE — 93005 ELECTROCARDIOGRAM TRACING: CPT | Performed by: NURSE PRACTITIONER

## 2021-09-16 PROCEDURE — 93010 ELECTROCARDIOGRAM REPORT: CPT | Performed by: INTERNAL MEDICINE

## 2021-09-16 PROCEDURE — 99204 OFFICE O/P NEW MOD 45 MIN: CPT | Performed by: NURSE PRACTITIONER

## 2021-09-16 RX ORDER — METOPROLOL SUCCINATE 25 MG/1
25 TABLET, EXTENDED RELEASE ORAL DAILY
Qty: 90 TABLET | Refills: 3 | Status: SHIPPED | OUTPATIENT
Start: 2021-09-16 | End: 2021-11-15

## 2021-09-16 RX ORDER — METOPROLOL SUCCINATE 25 MG/1
25 TABLET, EXTENDED RELEASE ORAL DAILY
Qty: 30 TABLET | Refills: 0 | Status: SHIPPED | OUTPATIENT
Start: 2021-09-16 | End: 2021-09-16 | Stop reason: SDUPTHER

## 2021-09-16 NOTE — PROGRESS NOTES
"[facility] Heart and Vascular    Chief Complaint  Atrial Fibrillation and Establish Care    Subjective    History of Present Illness {CC  Problem List  Visit  Diagnosis   Encounters  Notes  Medications  Labs  Result Review Imaging  Media :23}     Erik Arcos presents to [facility] CARDIOLOGY for   History of Present Illness     70-year-old male with hypertension, hyperlipidemia, GERD.  Patient presented to the surgery center for cataract surgery today.  Reported to have a rhythm strip showing atrial fibrillation.  Referred to the heart valve center for evaluation.    No known history of atrial fibrillation.  Rare palpitations with activity.  Patient has noted increased dyspnea on exertion over the last few months.  Improves with rest.  Reports occasional increase dizziness with change in positions over the last few months as well.  Denies chest pain, pressure, near syncope, syncope.    Reports history of snoring, observed apnea or gasping.  He is a shift worker.  Drinks at least 6 caffeinated beverages per day.  No history of sleep study.    No history of MI, known CAD, ischemic evaluation, TIA, CVA-like symptoms, brain bleed, GI bleed.      Objective     Vital Signs:   Vitals:    09/16/21 1141 09/16/21 1142 09/16/21 1143   BP: 130/86 138/75 148/63   BP Location: Right arm Left arm Left arm   Patient Position: Sitting Standing Sitting   Cuff Size: Adult Adult Adult   Pulse: 90 89 97   Resp:   23   Temp:   97.1 °F (36.2 °C)   TempSrc:   Temporal   SpO2: 97% 97% 97%   Weight:   108 kg (237 lb)   Height:   180.3 cm (70.98\")     Body mass index is 33.07 kg/m².  Physical Exam  Vitals reviewed.   Constitutional:       General: He is not in acute distress.     Appearance: Normal appearance. He is obese.   Cardiovascular:      Rate and Rhythm: Normal rate. Rhythm irregular.      Pulses:           Radial pulses are 2+ on the right side.        Dorsalis " pedis pulses are 2+ on the right side.        Posterior tibial pulses are 2+ on the right side.      Heart sounds: Normal heart sounds.   Pulmonary:      Effort: Pulmonary effort is normal.      Breath sounds: Normal breath sounds.   Abdominal:      Palpations: Abdomen is soft.      Tenderness: There is no abdominal tenderness.   Musculoskeletal:      Right lower leg: No edema.      Left lower leg: No edema.   Skin:     General: Skin is warm and dry.   Neurological:      Mental Status: He is alert.   Psychiatric:         Mood and Affect: Mood normal.         Behavior: Behavior is cooperative.              Result Review  Data Reviewed:{ Labs  Result Review  Imaging  Med Tab  Media :23}     Lab Results   Component Value Date    WBC 6.48 06/17/2021    HGB 14.6 06/17/2021    HCT 44.0 06/17/2021    MCV 88.0 06/17/2021     06/17/2021     Lab Results   Component Value Date    GLUCOSE 119 (H) 08/30/2016    CALCIUM 9.9 06/17/2021     06/17/2021    K 5.1 06/17/2021    CO2 28.3 06/17/2021    CL 98 06/17/2021    BUN 12 06/17/2021    CREATININE 0.79 06/17/2021    EGFRIFAFRI 117 06/17/2021    EGFRIFNONA 96 06/17/2021    BCR 15.2 06/17/2021    ANIONGAP 5.0 08/30/2016     Lab Results   Component Value Date    TSH 3.070 06/16/2020     Lab Results   Component Value Date    CHLPL 181 06/17/2021    TRIG 82 06/17/2021    HDL 77 (H) 06/17/2021    LDL 89 06/17/2021     Assessment and Plan {CC Problem List  Visit Diagnosis  ROS  Review (Popup)  Health Maintenance  Quality  BestPractice  Medications  SmartSets  SnapShot Encounters  Media :23}   1. New onset atrial fibrillation (CMS/HCC)  Duration unknown,     CHADS-VASc Risk Assessment            2 Total Score    1 Hypertension    1 Age 65-74        Criteria that do not apply:    CHF    Age >/= 75    DM    PRIOR STROKE/TIA/THROMBO    Vascular Disease    Sex: Female              - ECG 12 Lead; A. fib 99 bpm    Patient will initiate metoprolol succinate 25 mg  daily  Eliquis 5 mg twice a day for stroke prevention      - Adult Transthoracic Echo Complete W/ Cont if Necessary Per Protocol; Future    - Stress Test With Myocardial Perfusion (1 Day); Future    - Ambulatory Referral to Cardiology for continued follow-up    Metoprolol and Eliquis were sent for 30 days at local [facility] and then for 90-day supply to [facility]  - metoprolol succinate XL (TOPROL-XL) 25 MG 24 hr tablet; Take 1 tablet by mouth Daily.  Dispense: 90 tablet; Refill: 3  - apixaban (ELIQUIS) 5 MG tablet tablet; Take 1 tablet by mouth 2 (Two) Times a Day.  Dispense: 120 tablet; Refill: 3      A. fib education completed: What is atrial fibrillation, causes, triggers, signs and symptoms, medication management (rate control versus rhythm control) and stroke prevention, procedural management and indications, and the role of the atrial fibrillation center and when to call.  2. Essential hypertension  Continue lisinopril  - metoprolol succinate XL (TOPROL-XL) 25 MG 24 hr tablet; Take 1 tablet by mouth Daily.  Dispense: 90 tablet; Refill: 3    3. CARNEY (dyspnea on exertion)    - Adult Transthoracic Echo Complete W/ Cont if Necessary Per Protocol; Future  - Stress Test With Myocardial Perfusion (1 Day); Future    4. Snoring    - Ambulatory Referral to Sleep Medicine    5. Obesity (BMI 30-39.9)  Body mass index is 33.07 kg/m².  Diet and exercise modification for weight loss    HLP  Continue statin.          Follow Up {Instructions Charge Capture  Follow-up Communications :23}   Return if symptoms worsen or fail to improve.    Patient was given instructions and counseling regarding his condition or for health maintenance advice. Please see specific information pulled into the AVS if appropriate.  Patient was instructed to call the Heart and Valve Center with any questions, concerns, or worsening symptoms.    *Please note that portions of this note were completed with a voice recognition program. Efforts were made to edit  the dictations, but occasionally words are mistranscribed.

## 2021-09-16 NOTE — PATIENT INSTRUCTIONS
Start Eliquis 5 mg, 1 tab twice day    Start Metoprolol Succinate 25 mg, 1 tab daily    You will be called to schedule your echocardiogram (heart ultrasound), your stress test, your follow up with cardiology and your sleep study.

## 2021-09-22 ENCOUNTER — CONSULT (OUTPATIENT)
Dept: CARDIOLOGY | Facility: CLINIC | Age: 71
End: 2021-09-22

## 2021-09-22 VITALS
WEIGHT: 237 LBS | DIASTOLIC BLOOD PRESSURE: 84 MMHG | HEIGHT: 70 IN | HEART RATE: 99 BPM | BODY MASS INDEX: 33.93 KG/M2 | SYSTOLIC BLOOD PRESSURE: 144 MMHG | OXYGEN SATURATION: 97 %

## 2021-09-22 DIAGNOSIS — I10 ESSENTIAL HYPERTENSION: Primary | ICD-10-CM

## 2021-09-22 DIAGNOSIS — I48.19 PERSISTENT ATRIAL FIBRILLATION (HCC): ICD-10-CM

## 2021-09-22 PROCEDURE — 99203 OFFICE O/P NEW LOW 30 MIN: CPT | Performed by: INTERNAL MEDICINE

## 2021-09-22 NOTE — PROGRESS NOTES
Subjective:     Encounter Date:09/22/2021      Patient ID: Erik Arcos is a 70 y.o. male.    Chief Complaint: Atrial fibrillation  HPI  This is a 70-year-old male patient who was planning to undergo cataract surgery in [facility].  During the preop holding timeframe, the patient was noted to be in atrial fibrillation on cardiac monitoring.  He was started on anticoagulation therapy with Eliquis and rate control measures with Toprol-XL.  His cataract surgery was canceled.  The patient was unaware that he was in atrial fibrillation.  He had had no dizziness palpitations or syncope.  The patient has no prior history of atrial fibrillation.  He has a history of hypertension but no history of diabetes.  He has no personal history of stroke or TIA.  He has no history of vascular disease.  He has no history of myocardial infarction or coronary revascularization.  He has no history of congestive heart failure or valvular heart disease.  The patient has had an outpatient vasodilator nuclear stress test and an echocardiogram both ordered.  These are to be done later next month.  He has had no bleeding complications related to anticoagulation therapy.  He has had no signs or symptoms to suggest stroke, TIA or other cardioembolic event.  The following portions of the patient's history were reviewed and updated as appropriate: allergies, current medications, past family history, past medical history, past social history, past surgical history and problem  Review of Systems   Constitutional: Negative for chills, diaphoresis, fever, malaise/fatigue, weight gain and weight loss.   HENT: Negative for ear discharge, hearing loss, hoarse voice and nosebleeds.    Eyes: Negative for discharge, double vision, pain and photophobia.   Cardiovascular: Positive for palpitations. Negative for chest pain, claudication, cyanosis, dyspnea on exertion, irregular heartbeat, leg swelling, near-syncope, orthopnea, paroxysmal  nocturnal dyspnea and syncope.   Respiratory: Negative for cough, hemoptysis, shortness of breath, sputum production and wheezing.    Endocrine: Negative for cold intolerance, heat intolerance, polydipsia, polyphagia and polyuria.   Hematologic/Lymphatic: Negative for adenopathy and bleeding problem. Does not bruise/bleed easily.   Skin: Negative for color change, flushing, itching and rash.   Musculoskeletal: Negative for muscle cramps, muscle weakness, myalgias and stiffness.   Gastrointestinal: Negative for abdominal pain, diarrhea, hematemesis, hematochezia, nausea and vomiting.   Genitourinary: Negative for dysuria, frequency and nocturia.   Neurological: Negative for focal weakness, loss of balance, numbness, paresthesias and seizures.   Psychiatric/Behavioral: Negative for altered mental status, hallucinations and suicidal ideas.   Allergic/Immunologic: Negative for HIV exposure, hives and persistent infections.           Current Outpatient Medications:   •  [START ON 10/16/2021] apixaban (ELIQUIS) 5 MG tablet tablet, Take 1 tablet by mouth 2 (Two) Times a Day., Disp: 120 tablet, Rfl: 3  •  atorvastatin (LIPITOR) 20 MG tablet, TAKE 1 TABLET DAILY, Disp: 90 tablet, Rfl: 3  •  Cholecalciferol (VITAMIN D-3) 1000 UNITS capsule, Take  by mouth Daily., Disp: , Rfl:   •  lisinopril (PRINIVIL,ZESTRIL) 40 MG tablet, TAKE 1 TABLET DAILY, Disp: 90 tablet, Rfl: 1  •  metoprolol succinate XL (TOPROL-XL) 25 MG 24 hr tablet, Take 1 tablet by mouth Daily., Disp: 90 tablet, Rfl: 3  •  omeprazole (priLOSEC) 20 MG capsule, Take 1 capsule by mouth Daily., Disp: 90 capsule, Rfl: 1  •  sildenafil (VIAGRA) 100 MG tablet, Take 1 tablet by mouth Daily As Needed for Erectile Dysfunction., Disp: 25 tablet, Rfl: 1    Objective:   Vitals and nursing note reviewed.   Constitutional:       Appearance: Healthy appearance. Not in distress.   Neck:      Vascular: No JVR. JVD normal.   Pulmonary:      Effort: Pulmonary effort is normal.       "Breath sounds: Normal breath sounds. No wheezing. No rhonchi. No rales.   Chest:      Chest wall: Not tender to palpatation.   Cardiovascular:      PMI at left midclavicular line. Normal rate. Irregularly irregular rhythm. Normal S1. Normal S2.      Murmurs: There is no murmur.      No gallop. No click. No rub.   Pulses:     Intact distal pulses.   Edema:     Peripheral edema absent.   Abdominal:      General: Bowel sounds are normal.      Palpations: Abdomen is soft.      Tenderness: There is no abdominal tenderness.   Musculoskeletal: Normal range of motion.         General: No tenderness. Skin:     General: Skin is warm and dry.   Neurological:      General: No focal deficit present.      Mental Status: Alert and oriented to person, place and time.       Blood pressure 144/84, pulse 99, height 177.8 cm (70\"), weight 108 kg (237 lb), SpO2 97 %.   Lab Review:     Assessment:       1. Essential hypertension  Acceptable blood pressure control.    2. Persistent atrial fibrillation (HCC)  Anticoagulation and rate control strategy    Procedures    Plan:     Advance Care Planning   ACP discussion was held with the patient during this visit. Patient has an advance directive (not in EMR), copy requested.   We will arrange follow-up after the patient's outpatient testing has been completed.  Obviously the patient may proceed with cataract surgery as atrial fibrillation alone would not of been an indication to postpone or reschedule his surgery.  Cataract surgery being bloodless surgery does not require interruption of anticoagulation therapy.  Atrial fibrillation does not affect his surgical operative risk.      "

## 2021-09-27 ENCOUNTER — OFFICE VISIT (OUTPATIENT)
Dept: SLEEP MEDICINE | Facility: CLINIC | Age: 71
End: 2021-09-27

## 2021-09-27 VITALS
HEART RATE: 74 BPM | OXYGEN SATURATION: 98 % | SYSTOLIC BLOOD PRESSURE: 158 MMHG | HEIGHT: 70 IN | WEIGHT: 244.6 LBS | BODY MASS INDEX: 35.02 KG/M2 | DIASTOLIC BLOOD PRESSURE: 105 MMHG

## 2021-09-27 DIAGNOSIS — R06.83 SNORING: Primary | ICD-10-CM

## 2021-09-27 DIAGNOSIS — I48.91 ATRIAL FIBRILLATION, UNSPECIFIED TYPE (HCC): ICD-10-CM

## 2021-09-27 DIAGNOSIS — G47.33 OBSTRUCTIVE SLEEP APNEA, ADULT: ICD-10-CM

## 2021-09-27 DIAGNOSIS — E66.09 CLASS 1 OBESITY DUE TO EXCESS CALORIES WITHOUT SERIOUS COMORBIDITY WITH BODY MASS INDEX (BMI) OF 34.0 TO 34.9 IN ADULT: ICD-10-CM

## 2021-09-27 PROCEDURE — 99202 OFFICE O/P NEW SF 15 MIN: CPT | Performed by: INTERNAL MEDICINE

## 2021-09-30 NOTE — PROGRESS NOTES
Subjective   Erik Arcos is a 70 y.o. male is being seen for consultation today at the request of SONA Coffman for the evaluation of snoring and possible sleep disordered breathing.  He is seen in person in the sleep clinic.  His primary care physician is Dr. Vermeesch, he is also seen by Dr. Wen.    History of Present Illness  Patient has a history of atrial fibrillation known for several weeks.  He is on medications.  He has had snoring noted for at least 4 years.  He denies any noted apneas.  He denies awakening gasping for breath.  He says he is usually rested in the morning.  He denies having morning headache.    He is occasionally sleepy if he sitting quietly during the day.  He denies problems while driving.  He has a history of reflux.  He occasionally awakens with a dry mouth.  He denies ever breaking his nose.  He denies having trouble breathing through his nose.  He has a history of occasional kicking of his legs at night but does not think it keeps him awake.  He denies having chronic pain that keeps him awake.    He works nights and till about 5 AM.  He will go to bed about 6 AM.  He falls asleep in 15 minutes.  He awakens twice during his sleep time.  He thinks he gets about 7 hours of sleep and says he usually feels rested.  He has had hypertension known for 10 years.  He denies any history of diabetes or coronary artery disease.  He does have a history of atrial fibrillation.  He has a history of hyperlipidemia.  No Known Allergies       Current Outpatient Medications:   •  [START ON 10/16/2021] apixaban (ELIQUIS) 5 MG tablet tablet, Take 1 tablet by mouth 2 (Two) Times a Day., Disp: 120 tablet, Rfl: 3  •  atorvastatin (LIPITOR) 20 MG tablet, TAKE 1 TABLET DAILY, Disp: 90 tablet, Rfl: 3  •  Cholecalciferol (VITAMIN D-3) 1000 UNITS capsule, Take  by mouth Daily., Disp: , Rfl:   •  lisinopril (PRINIVIL,ZESTRIL) 40 MG tablet, TAKE 1 TABLET DAILY, Disp: 90 tablet, Rfl: 1  •  metoprolol  "succinate XL (TOPROL-XL) 25 MG 24 hr tablet, Take 1 tablet by mouth Daily., Disp: 90 tablet, Rfl: 3  •  omeprazole (priLOSEC) 20 MG capsule, Take 1 capsule by mouth Daily., Disp: 90 capsule, Rfl: 1  •  sildenafil (VIAGRA) 100 MG tablet, Take 1 tablet by mouth Daily As Needed for Erectile Dysfunction., Disp: 25 tablet, Rfl: 1    Social History    Tobacco Use      Smoking status: Former Smoker      Smokeless tobacco: Never Used       Social History     Substance and Sexual Activity   Alcohol Use No       Caffeine: He had 3 cups of coffee and 3 michelle each day    Past Medical History:   Diagnosis Date   • History of prediabetes    • Vitamin D deficiency    For history of atrial fibrillation and a history of hypertension    Past Surgical History:   Procedure Laterality Date   • COLONOSCOPY      Complete   • STOMACH SURGERY         Family History   Problem Relation Age of Onset   • Cancer Mother    • No Known Problems Father    • No Known Problems Sister    • Diabetes Half-Sister    • No Known Problems Half-Brother        The following portions of the patient's history were reviewed and updated as appropriate: allergies, current medications, past family history, past medical history, past social history, past surgical history and problem list.    Review of Systems   Constitutional: Negative.    HENT: Negative.    Eyes: Negative.    Respiratory: Negative.    Cardiovascular: Negative.    Gastrointestinal: Negative.    Endocrine: Negative.    Genitourinary: Negative.    Musculoskeletal: Negative.    Skin: Negative.    Allergic/Immunologic: Negative.    Neurological: Positive for dizziness.   Hematological: Bruises/bleeds easily.   Psychiatric/Behavioral: Negative.    Richwood score is 4/24    Objective     BP (!) 158/105 (BP Location: Left arm, Patient Position: Sitting, Cuff Size: Adult)   Pulse 74   Ht 177.8 cm (70\")   Wt 111 kg (244 lb 9.6 oz)   SpO2 98%   BMI 35.10 kg/m²      Physical Exam  Patient appears to be " awake and alert.  He is not appear to be in acute respiratory distress.    He is normocephalic.  He he has Mallampati class III anatomy.    Lungs are clear.    Cardiac exam revealed an irregularly irregular rhythm.    Extremities showed no edema.    Assessment/Plan   Diagnoses and all orders for this visit:    1. Snoring (Primary)  -     Home Sleep Study; Future    2. Obstructive sleep apnea, adult  -     Home Sleep Study; Future    3. Class 1 obesity due to excess calories without serious comorbidity with body mass index (BMI) of 34.0 to 34.9 in adult    4. Atrial fibrillation, unspecified type (HCC)    Patient has a history of snoring and atrial fibrillation.  I think he gives a fairly good story for obstructive sleep apnea.  We will plan to proceed to home sleep test which is his preference.  We have discussed potential therapies including CPAP, weight control, oral appliance, and surgery.  He is encouraged to lose weight.  He declines referral to a dietitian.  He is encouraged to avoid alcohol and sedatives close to bedtime.  He is encouraged to practice lateral position sleep.  We will see him back after his study.    Total time: 25 minutes exclusive of procedures.         Jake Laura MD [facility]  Sleep Medicine  Pulmonary and Critical Care Medicine

## 2021-10-06 DIAGNOSIS — I48.91 NEW ONSET ATRIAL FIBRILLATION (HCC): ICD-10-CM

## 2021-10-06 DIAGNOSIS — I10 ESSENTIAL HYPERTENSION: ICD-10-CM

## 2021-10-08 RX ORDER — APIXABAN 5 MG/1
TABLET, FILM COATED ORAL
Qty: 60 TABLET | OUTPATIENT
Start: 2021-10-08

## 2021-10-08 RX ORDER — METOPROLOL SUCCINATE 25 MG/1
TABLET, EXTENDED RELEASE ORAL
Qty: 30 TABLET | OUTPATIENT
Start: 2021-10-08

## 2021-10-28 ENCOUNTER — HOSPITAL ENCOUNTER (OUTPATIENT)
Dept: CARDIOLOGY | Facility: HOSPITAL | Age: 71
Discharge: HOME | End: 2021-10-28

## 2021-10-28 VITALS
BODY MASS INDEX: 34.93 KG/M2 | WEIGHT: 244 LBS | DIASTOLIC BLOOD PRESSURE: 114 MMHG | SYSTOLIC BLOOD PRESSURE: 154 MMHG | HEIGHT: 70 IN | HEART RATE: 110 BPM

## 2021-10-28 VITALS — HEIGHT: 70 IN | BODY MASS INDEX: 34.93 KG/M2 | WEIGHT: 244 LBS

## 2021-10-28 DIAGNOSIS — I48.91 NEW ONSET ATRIAL FIBRILLATION (HCC): ICD-10-CM

## 2021-10-28 DIAGNOSIS — R06.09 DOE (DYSPNEA ON EXERTION): ICD-10-CM

## 2021-10-28 LAB
BH CV ECHO MEAS - AO MAX PG (FULL): 2.4 MMHG
BH CV ECHO MEAS - AO MAX PG: 5.4 MMHG
BH CV ECHO MEAS - AO MEAN PG (FULL): 0.86 MMHG
BH CV ECHO MEAS - AO MEAN PG: 2.6 MMHG
BH CV ECHO MEAS - AO ROOT AREA (BSA CORRECTED): 1.6
BH CV ECHO MEAS - AO ROOT AREA: 10.4 CM^2
BH CV ECHO MEAS - AO ROOT DIAM: 3.6 CM
BH CV ECHO MEAS - AO V2 MAX: 115.8 CM/SEC
BH CV ECHO MEAS - AO V2 MEAN: 81.5 CM/SEC
BH CV ECHO MEAS - AO V2 VTI: 22.1 CM
BH CV ECHO MEAS - AVA(I,A): 2.6 CM^2
BH CV ECHO MEAS - AVA(I,D): 2.6 CM^2
BH CV ECHO MEAS - AVA(V,A): 2.7 CM^2
BH CV ECHO MEAS - AVA(V,D): 2.7 CM^2
BH CV ECHO MEAS - BSA(HAYCOCK): 2.4 M^2
BH CV ECHO MEAS - BSA: 2.3 M^2
BH CV ECHO MEAS - BZI_BMI: 35 KILOGRAMS/M^2
BH CV ECHO MEAS - BZI_METRIC_HEIGHT: 177.8 CM
BH CV ECHO MEAS - BZI_METRIC_WEIGHT: 110.7 KG
BH CV ECHO MEAS - EDV(CUBED): 73.3 ML
BH CV ECHO MEAS - EDV(MOD-SP2): 101 ML
BH CV ECHO MEAS - EDV(MOD-SP4): 120 ML
BH CV ECHO MEAS - EDV(TEICH): 77.9 ML
BH CV ECHO MEAS - EF(CUBED): 55.9 %
BH CV ECHO MEAS - EF(MOD-BP): 54 %
BH CV ECHO MEAS - EF(MOD-SP2): 48.5 %
BH CV ECHO MEAS - EF(MOD-SP4): 56.7 %
BH CV ECHO MEAS - EF(TEICH): 48 %
BH CV ECHO MEAS - ESV(CUBED): 32.3 ML
BH CV ECHO MEAS - ESV(MOD-SP2): 52 ML
BH CV ECHO MEAS - ESV(MOD-SP4): 52 ML
BH CV ECHO MEAS - ESV(TEICH): 40.5 ML
BH CV ECHO MEAS - FS: 23.9 %
BH CV ECHO MEAS - IVS/LVPW: 0.99
BH CV ECHO MEAS - IVSD: 1.3 CM
BH CV ECHO MEAS - LA DIMENSION: 5.3 CM
BH CV ECHO MEAS - LA/AO: 1.4
BH CV ECHO MEAS - LAD MAJOR: 6.9 CM
BH CV ECHO MEAS - LAT PEAK E' VEL: 12.9 CM/SEC
BH CV ECHO MEAS - LATERAL E/E' RATIO: 5.7
BH CV ECHO MEAS - LV DIASTOLIC VOL/BSA (35-75): 52.8 ML/M^2
BH CV ECHO MEAS - LV MASS(C)D: 209.5 GRAMS
BH CV ECHO MEAS - LV MASS(C)DI: 92.3 GRAMS/M^2
BH CV ECHO MEAS - LV MAX PG: 2.9 MMHG
BH CV ECHO MEAS - LV MEAN PG: 1.7 MMHG
BH CV ECHO MEAS - LV SYSTOLIC VOL/BSA (12-30): 22.9 ML/M^2
BH CV ECHO MEAS - LV V1 MAX: 85.4 CM/SEC
BH CV ECHO MEAS - LV V1 MEAN: 62 CM/SEC
BH CV ECHO MEAS - LV V1 VTI: 15.7 CM
BH CV ECHO MEAS - LVIDD: 4.2 CM
BH CV ECHO MEAS - LVIDS: 3.2 CM
BH CV ECHO MEAS - LVLD AP2: 8.4 CM
BH CV ECHO MEAS - LVLD AP4: 7.9 CM
BH CV ECHO MEAS - LVLS AP2: 7.5 CM
BH CV ECHO MEAS - LVLS AP4: 7.1 CM
BH CV ECHO MEAS - LVOT AREA (M): 3.8 CM^2
BH CV ECHO MEAS - LVOT AREA: 3.7 CM^2
BH CV ECHO MEAS - LVOT DIAM: 2.2 CM
BH CV ECHO MEAS - LVPWD: 1.3 CM
BH CV ECHO MEAS - MED PEAK E' VEL: 9.3 CM/SEC
BH CV ECHO MEAS - MEDIAL E/E' RATIO: 8
BH CV ECHO MEAS - MV DEC SLOPE: 602 CM/SEC^2
BH CV ECHO MEAS - MV DEC TIME: 0.17 SEC
BH CV ECHO MEAS - MV E MAX VEL: 75 CM/SEC
BH CV ECHO MEAS - MV MAX PG: 4.9 MMHG
BH CV ECHO MEAS - MV MEAN PG: 2 MMHG
BH CV ECHO MEAS - MV P1/2T MAX VEL: 126.7 CM/SEC
BH CV ECHO MEAS - MV P1/2T: 61.6 MSEC
BH CV ECHO MEAS - MV V2 MAX: 111 CM/SEC
BH CV ECHO MEAS - MV V2 MEAN: 65.6 CM/SEC
BH CV ECHO MEAS - MV V2 VTI: 32.9 CM
BH CV ECHO MEAS - MVA P1/2T LCG: 1.7 CM^2
BH CV ECHO MEAS - MVA(P1/2T): 3.6 CM^2
BH CV ECHO MEAS - MVA(VTI): 1.8 CM^2
BH CV ECHO MEAS - PA ACC SLOPE: 587 CM/SEC^2
BH CV ECHO MEAS - PA ACC TIME: 0.11 SEC
BH CV ECHO MEAS - PA PR(ACCEL): 28.3 MMHG
BH CV ECHO MEAS - SI(AO): 101.5 ML/M^2
BH CV ECHO MEAS - SI(CUBED): 18.1 ML/M^2
BH CV ECHO MEAS - SI(LVOT): 25.5 ML/M^2
BH CV ECHO MEAS - SI(MOD-SP2): 21.6 ML/M^2
BH CV ECHO MEAS - SI(MOD-SP4): 29.9 ML/M^2
BH CV ECHO MEAS - SI(TEICH): 16.5 ML/M^2
BH CV ECHO MEAS - SV(AO): 230.6 ML
BH CV ECHO MEAS - SV(CUBED): 41 ML
BH CV ECHO MEAS - SV(LVOT): 57.9 ML
BH CV ECHO MEAS - SV(MOD-SP2): 49 ML
BH CV ECHO MEAS - SV(MOD-SP4): 68 ML
BH CV ECHO MEAS - SV(TEICH): 37.4 ML
BH CV ECHO MEAS - TAPSE (>1.6): 1.6 CM
BH CV ECHO MEASUREMENTS AVERAGE E/E' RATIO: 6.76
BH CV REST NUCLEAR ISOTOPE DOSE: 9.8 MCI
BH CV STRESS BP STAGE 3: NORMAL
BH CV STRESS COMMENTS STAGE 1: NORMAL
BH CV STRESS DOSE REGADENOSON STAGE 1: 0.4
BH CV STRESS DURATION MIN STAGE 1: 0
BH CV STRESS DURATION MIN STAGE 2: 1
BH CV STRESS DURATION MIN STAGE 3: 1
BH CV STRESS DURATION MIN STAGE 4: 1
BH CV STRESS DURATION SEC STAGE 1: 10
BH CV STRESS HR STAGE 1: 101
BH CV STRESS HR STAGE 2: 129
BH CV STRESS HR STAGE 3: 120
BH CV STRESS HR STAGE 4: 121
BH CV STRESS NUCLEAR ISOTOPE DOSE: 32.8 MCI
BH CV STRESS O2 STAGE 1: 96
BH CV STRESS O2 STAGE 2: 95
BH CV STRESS O2 STAGE 3: 98
BH CV STRESS O2 STAGE 4: 96
BH CV STRESS PROTOCOL 1: NORMAL
BH CV STRESS RECOVERY BP: NORMAL MMHG
BH CV STRESS RECOVERY HR: 108 BPM
BH CV STRESS RECOVERY O2: 96 %
BH CV STRESS STAGE 1: 1
BH CV STRESS STAGE 2: 2
BH CV STRESS STAGE 3: 3
BH CV STRESS STAGE 4: 4
BH CV VAS BP LEFT ARM: NORMAL MMHG
BH CV XLRA - RV BASE: 4.3 CM
BH CV XLRA - RV MID: 3.4 CM
LEFT ATRIUM VOLUME INDEX: 64.7 ML/M^2
LEFT ATRIUM VOLUME: 147 ML
LV EF 2D ECHO EST: 60 %
LV EF NUC BP: 51 %
MAXIMAL PREDICTED HEART RATE: 148 BPM
MAXIMAL PREDICTED HEART RATE: 148 BPM
PERCENT MAX PREDICTED HR: 93.92 %
STRESS BASELINE BP: NORMAL MMHG
STRESS BASELINE HR: 96 BPM
STRESS O2 SAT REST: 96 %
STRESS PERCENT HR: 110 %
STRESS POST EXERCISE DUR MIN: 4 MIN
STRESS POST EXERCISE DUR SEC: 0 SEC
STRESS POST PEAK BP: NORMAL MMHG
STRESS POST PEAK HR: 139 BPM
STRESS TARGET HR: 126 BPM
STRESS TARGET HR: 126 BPM

## 2021-10-28 PROCEDURE — 78452 HT MUSCLE IMAGE SPECT MULT: CPT | Performed by: INTERNAL MEDICINE

## 2021-10-28 PROCEDURE — A9500 TC99M SESTAMIBI: HCPCS | Performed by: NURSE PRACTITIONER

## 2021-10-28 PROCEDURE — 93018 CV STRESS TEST I&R ONLY: CPT | Performed by: INTERNAL MEDICINE

## 2021-10-28 PROCEDURE — 93017 CV STRESS TEST TRACING ONLY: CPT

## 2021-10-28 PROCEDURE — 78451 HT MUSCLE IMAGE SPECT SING: CPT

## 2021-10-28 PROCEDURE — 0 TECHNETIUM SESTAMIBI: Performed by: NURSE PRACTITIONER

## 2021-10-28 PROCEDURE — 25010000002 REGADENOSON 0.4 MG/5ML SOLUTION: Performed by: NURSE PRACTITIONER

## 2021-10-28 PROCEDURE — 93306 TTE W/DOPPLER COMPLETE: CPT

## 2021-10-28 PROCEDURE — 93306 TTE W/DOPPLER COMPLETE: CPT | Performed by: INTERNAL MEDICINE

## 2021-10-28 RX ADMIN — REGADENOSON 0.4 MG: 0.08 INJECTION, SOLUTION INTRAVENOUS at 12:36

## 2021-10-28 RX ADMIN — TECHNETIUM TC 99M SESTAMIBI 1 DOSE: 1 INJECTION INTRAVENOUS at 10:48

## 2021-10-28 RX ADMIN — TECHNETIUM TC 99M SESTAMIBI 1 DOSE: 1 INJECTION INTRAVENOUS at 12:45

## 2021-10-29 ENCOUNTER — TELEPHONE (OUTPATIENT)
Dept: CARDIOLOGY | Facility: HOSPITAL | Age: 71
End: 2021-10-29

## 2021-11-05 DIAGNOSIS — I10 ESSENTIAL HYPERTENSION: ICD-10-CM

## 2021-11-05 DIAGNOSIS — I48.91 NEW ONSET ATRIAL FIBRILLATION (HCC): ICD-10-CM

## 2021-11-05 RX ORDER — METOPROLOL SUCCINATE 25 MG/1
TABLET, EXTENDED RELEASE ORAL
Qty: 30 TABLET | OUTPATIENT
Start: 2021-11-05

## 2021-11-15 ENCOUNTER — OFFICE VISIT (OUTPATIENT)
Dept: CARDIOLOGY | Facility: CLINIC | Age: 71
End: 2021-11-15

## 2021-11-15 VITALS
DIASTOLIC BLOOD PRESSURE: 80 MMHG | OXYGEN SATURATION: 97 % | BODY MASS INDEX: 35.65 KG/M2 | SYSTOLIC BLOOD PRESSURE: 150 MMHG | WEIGHT: 249 LBS | HEIGHT: 70 IN | HEART RATE: 93 BPM

## 2021-11-15 DIAGNOSIS — I10 PRIMARY HYPERTENSION: ICD-10-CM

## 2021-11-15 DIAGNOSIS — R73.03 PREDIABETES: ICD-10-CM

## 2021-11-15 DIAGNOSIS — I48.19 PERSISTENT ATRIAL FIBRILLATION (HCC): Primary | ICD-10-CM

## 2021-11-15 PROCEDURE — 99213 OFFICE O/P EST LOW 20 MIN: CPT | Performed by: INTERNAL MEDICINE

## 2021-11-15 PROCEDURE — 93000 ELECTROCARDIOGRAM COMPLETE: CPT | Performed by: INTERNAL MEDICINE

## 2021-11-15 RX ORDER — AMLODIPINE BESYLATE 5 MG/1
5 TABLET ORAL DAILY
Qty: 30 TABLET | Refills: 11 | Status: SHIPPED | OUTPATIENT
Start: 2021-11-15 | End: 2022-11-14

## 2021-11-15 NOTE — PROGRESS NOTES
Subjective:     Encounter Date:11/15/2021      Patient ID: Erik Arcos is a 70 y.o. male.    Chief Complaint: Atrial fibrillation  HPI  This is a 70-year-old male patient who presents to cardiology clinic for ongoing evaluation and management of atrial fibrillation.  The patient has undergone an echocardiogram which showed a normal ejection fraction with no evidence of valvular heart disease.  The patient has undergone a vasodilator nuclear stress test with no evidence of inducible ischemia and a normal calculated ejection fraction.  He is tolerating anticoagulation therapy without bleeding side effects.  He has had no signs or symptoms to suggest stroke, TIA or other cardioembolic event.  The following portions of the patient's history were reviewed and updated as appropriate: allergies, current medications, past family history, past medical history, past social history, past surgical history and problem  Review of Systems   Constitutional: Negative for chills, diaphoresis, fever, malaise/fatigue, weight gain and weight loss.   HENT: Negative for ear discharge, hearing loss, hoarse voice and nosebleeds.    Eyes: Negative for discharge, double vision, pain and photophobia.   Cardiovascular: Positive for palpitations. Negative for chest pain, claudication, cyanosis, dyspnea on exertion, irregular heartbeat, leg swelling, near-syncope, orthopnea, paroxysmal nocturnal dyspnea and syncope.   Respiratory: Negative for cough, hemoptysis, shortness of breath, sputum production and wheezing.    Endocrine: Negative for cold intolerance, heat intolerance, polydipsia, polyphagia and polyuria.   Hematologic/Lymphatic: Negative for adenopathy and bleeding problem. Does not bruise/bleed easily.   Skin: Negative for color change, flushing, itching and rash.   Musculoskeletal: Negative for muscle cramps, muscle weakness, myalgias and stiffness.   Gastrointestinal: Negative for abdominal pain, diarrhea, hematemesis,  hematochezia, nausea and vomiting.   Genitourinary: Negative for dysuria, frequency and nocturia.   Neurological: Negative for focal weakness, loss of balance, numbness, paresthesias and seizures.   Psychiatric/Behavioral: Negative for altered mental status, hallucinations and suicidal ideas.   Allergic/Immunologic: Negative for HIV exposure, hives and persistent infections.           Current Outpatient Medications:   •  apixaban (ELIQUIS) 5 MG tablet tablet, Take 1 tablet by mouth 2 (Two) Times a Day., Disp: 120 tablet, Rfl: 3  •  atorvastatin (LIPITOR) 20 MG tablet, TAKE 1 TABLET DAILY, Disp: 90 tablet, Rfl: 3  •  Cholecalciferol (VITAMIN D-3) 1000 UNITS capsule, Take  by mouth Daily., Disp: , Rfl:   •  lisinopril (PRINIVIL,ZESTRIL) 40 MG tablet, TAKE 1 TABLET DAILY, Disp: 90 tablet, Rfl: 1  •  omeprazole (priLOSEC) 20 MG capsule, Take 1 capsule by mouth Daily., Disp: 90 capsule, Rfl: 1  •  sildenafil (VIAGRA) 100 MG tablet, Take 1 tablet by mouth Daily As Needed for Erectile Dysfunction., Disp: 25 tablet, Rfl: 1  •  amLODIPine (NORVASC) 5 MG tablet, Take 1 tablet by mouth Daily., Disp: 30 tablet, Rfl: 11  •  Sotalol HCl AF 80 MG tablet, Take 1 tablet by mouth 2 (Two) Times a Day., Disp: 60 tablet, Rfl: 11    Objective:   Vitals and nursing note reviewed.   Constitutional:       Appearance: Healthy appearance. Not in distress.   Neck:      Vascular: No JVR. JVD normal.   Pulmonary:      Effort: Pulmonary effort is normal.      Breath sounds: Normal breath sounds. No wheezing. No rhonchi. No rales.   Chest:      Chest wall: Not tender to palpatation.   Cardiovascular:      PMI at left midclavicular line. Normal rate. Irregularly irregular rhythm. Normal S1. Normal S2.      Murmurs: There is no murmur.      No gallop. No click. No rub.   Pulses:     Intact distal pulses.   Edema:     Peripheral edema absent.   Abdominal:      General: Bowel sounds are normal.      Palpations: Abdomen is soft.      Tenderness: There  "is no abdominal tenderness.   Musculoskeletal: Normal range of motion.         General: No tenderness. Skin:     General: Skin is warm and dry.   Neurological:      General: No focal deficit present.      Mental Status: Alert and oriented to person, place and time.       Blood pressure 150/80, pulse 93, height 177.8 cm (70\"), weight 113 kg (249 lb), SpO2 97 %.   Lab Review:     Assessment:       1. Persistent atrial fibrillation (HCC)  Symptomatic.    2. Primary hypertension  Less than ideal blood pressure control.    3. Prediabetes      Procedures    Plan:     Advance Care Planning   ACP discussion was held with the patient during this visit. Patient has an advance directive (not in EMR), copy requested.    I have recommended starting sotalol 80 mg orally twice daily and substitution for metoprolol.  I have recommended starting amlodipine 5 mg orally once per day.  The patient will have a follow-up ECG in 72 hours to check QT interval.  If the patient does not pharmacologically cardiovert over the next 6-8 weeks, we will plan for elective outpatient synchronized external cardioversion.  No further cardiovascular testing is indicated.    "

## 2021-12-13 ENCOUNTER — HOSPITAL ENCOUNTER (OUTPATIENT)
Dept: SLEEP MEDICINE | Facility: HOSPITAL | Age: 71
Discharge: HOME | End: 2021-12-13
Admitting: INTERNAL MEDICINE

## 2021-12-13 DIAGNOSIS — R06.83 SNORING: ICD-10-CM

## 2021-12-13 DIAGNOSIS — G47.33 OBSTRUCTIVE SLEEP APNEA, ADULT: ICD-10-CM

## 2021-12-13 PROCEDURE — 95806 SLEEP STUDY UNATT&RESP EFFT: CPT | Performed by: INTERNAL MEDICINE

## 2021-12-13 PROCEDURE — 95806 SLEEP STUDY UNATT&RESP EFFT: CPT

## 2021-12-16 ENCOUNTER — OFFICE VISIT (OUTPATIENT)
Dept: INTERNAL MEDICINE | Facility: CLINIC | Age: 71
End: 2021-12-16

## 2021-12-16 VITALS
DIASTOLIC BLOOD PRESSURE: 68 MMHG | HEART RATE: 98 BPM | SYSTOLIC BLOOD PRESSURE: 124 MMHG | HEIGHT: 70 IN | BODY MASS INDEX: 35.36 KG/M2 | OXYGEN SATURATION: 95 % | TEMPERATURE: 97.1 F | WEIGHT: 247 LBS

## 2021-12-16 DIAGNOSIS — I10 PRIMARY HYPERTENSION: Primary | ICD-10-CM

## 2021-12-16 DIAGNOSIS — I48.19 PERSISTENT ATRIAL FIBRILLATION (HCC): ICD-10-CM

## 2021-12-16 DIAGNOSIS — E78.5 HYPERLIPIDEMIA, UNSPECIFIED HYPERLIPIDEMIA TYPE: ICD-10-CM

## 2021-12-16 DIAGNOSIS — R73.03 PREDIABETES: ICD-10-CM

## 2021-12-16 DIAGNOSIS — K22.70 BARRETT'S ESOPHAGUS WITHOUT DYSPLASIA: ICD-10-CM

## 2021-12-16 PROCEDURE — G0008 ADMIN INFLUENZA VIRUS VAC: HCPCS | Performed by: INTERNAL MEDICINE

## 2021-12-16 PROCEDURE — 90662 IIV NO PRSV INCREASED AG IM: CPT | Performed by: INTERNAL MEDICINE

## 2021-12-16 PROCEDURE — 99214 OFFICE O/P EST MOD 30 MIN: CPT | Performed by: INTERNAL MEDICINE

## 2021-12-16 RX ORDER — LISINOPRIL 40 MG/1
40 TABLET ORAL DAILY
Qty: 90 TABLET | Refills: 1 | Status: SHIPPED | OUTPATIENT
Start: 2021-12-16 | End: 2021-12-27

## 2021-12-16 RX ORDER — OMEPRAZOLE 20 MG/1
20 CAPSULE, DELAYED RELEASE ORAL DAILY
Qty: 90 CAPSULE | Refills: 1 | Status: SHIPPED | OUTPATIENT
Start: 2021-12-16 | End: 2022-08-02 | Stop reason: SDUPTHER

## 2021-12-16 RX ORDER — SILDENAFIL 100 MG/1
100 TABLET, FILM COATED ORAL DAILY PRN
Qty: 25 TABLET | Refills: 1 | Status: SHIPPED | OUTPATIENT
Start: 2021-12-16 | End: 2022-06-04

## 2021-12-16 NOTE — PROGRESS NOTES
Chief Complaint   Patient presents with   • Follow-up     for HLD, HTN, and prediabetes.      Subjective   Erik Arcos is a 71 y.o. male.     Here for follow-up of HTN, HLD, GERD, ED, preDM.   HTN/HLD/A. fib- BP is well controlled today.  No CP, palpitations. He gets SOA with exertion.  Some edema at times. He takes his lipitor every night.  He works as a  and is active walking.  Recently seen by cardiologist and metoprolol was discontinued and he was started on sotalol to help with heart rate control and hopefully conversion to normal sinus rhythm.  He remains on Eliquis for underlying atrial fibrillation.  If he does not convert to normal sinus rhythm plan is for cardioversion soon.  Amlodipine was also started approximately 1 month ago due to poorly controlled blood pressure.  PreDM- he is avoiding sweets.  A1c 5.5 six month ago.  Last eye exam was over a yr ago  GERD with Barretts-he remains on omeprazole for GERD, well controlled.  EGD in Nov 2020 reveals ongoing Barretts.   Vit d def- he remains on vit D 1000 u  ED- he uses a whole tab of viagra and it works well.  He awakens about 1-2 times a night to urinate.  No hesitancy or dribbling  HCM- colonoscopy done per Dr Delaney June 2017 next one due 2022.  He has had both pneumococcal vaccines, 1 hepatitis A vaccine and all 3 Covid vaccines.  No documented shingles vaccine or seasonal flu vaccine.  He just had a sleep study done 3 days ago.  He will likely require a CPAP based on result, not yet read though.        The following portions of the patient's history were reviewed and updated as appropriate: allergies, current medications, past family history, past medical history, past social history, past surgical history and problem list.    Review of Systems   Constitutional: Negative for activity change, appetite change and unexpected weight change.   Eyes: Negative for visual disturbance.   Respiratory: Positive for shortness of breath.         " Snoring   Cardiovascular: Positive for palpitations and leg swelling. Negative for chest pain.   Gastrointestinal: Negative for abdominal pain.   Genitourinary: Negative for hematuria.   Musculoskeletal: Negative for back pain.   Neurological: Negative for numbness and headaches.   Psychiatric/Behavioral: Negative for dysphoric mood and sleep disturbance.       Objective   /68   Pulse 98   Temp 97.1 °F (36.2 °C)   Ht 177.8 cm (70\")   Wt 112 kg (247 lb)   SpO2 95%   BMI 35.44 kg/m²   Body mass index is 35.44 kg/m².  Physical Exam  Vitals and nursing note reviewed.   Constitutional:       General: He is not in acute distress.     Appearance: Normal appearance. He is well-developed. He is obese. He is not ill-appearing.      Comments: Kind and pleasant man, appears stated age and in NAD today   HENT:      Head: Normocephalic and atraumatic.      Right Ear: External ear normal.      Left Ear: External ear normal.   Eyes:      General:         Right eye: No discharge.         Left eye: No discharge.      Extraocular Movements: Extraocular movements intact.      Conjunctiva/sclera: Conjunctivae normal.      Pupils: Pupils are equal, round, and reactive to light.   Neck:      Thyroid: No thyromegaly.      Vascular: No carotid bruit.      Comments: No thyromegaly or mass  Cardiovascular:      Rate and Rhythm: Normal rate. Rhythm irregular.      Pulses: Normal pulses.      Heart sounds: Normal heart sounds. No murmur heard.       Comments: Irregularly irregular  Pulmonary:      Effort: Pulmonary effort is normal. No respiratory distress.      Breath sounds: Normal breath sounds. No wheezing.   Abdominal:      General: Bowel sounds are normal. There is no distension.      Palpations: Abdomen is soft.      Tenderness: There is no abdominal tenderness.   Musculoskeletal:         General: Normal range of motion.      Cervical back: Normal range of motion and neck supple.      Right lower leg: No edema.      Left " lower leg: No edema.   Lymphadenopathy:      Cervical: No cervical adenopathy.   Skin:     General: Skin is warm.      Findings: No rash.   Neurological:      General: No focal deficit present.      Mental Status: He is alert and oriented to person, place, and time. Mental status is at baseline.      Cranial Nerves: No cranial nerve deficit.      Motor: No weakness.      Coordination: Coordination normal.      Gait: Gait normal.   Psychiatric:         Mood and Affect: Mood normal.         Behavior: Behavior normal.         Thought Content: Thought content normal.         Judgment: Judgment normal.         Assessment/Plan   Erik Arcos is here today and the following problems have been addressed:      Diagnoses and all orders for this visit:    1. Primary hypertension (Primary)    2. Hyperlipidemia, unspecified hyperlipidemia type    3. Persistent atrial fibrillation (HCC)    4. Prediabetes    5. York's esophagus without dysplasia    Other orders  -     lisinopril (PRINIVIL,ZESTRIL) 40 MG tablet; Take 1 tablet by mouth Daily.  Dispense: 90 tablet; Refill: 1  -     omeprazole (priLOSEC) 20 MG capsule; Take 1 capsule by mouth Daily.  Dispense: 90 capsule; Refill: 1  -     sildenafil (VIAGRA) 100 MG tablet; Take 1 tablet by mouth Daily As Needed for Erectile Dysfunction.  Dispense: 25 tablet; Refill: 1  -     Fluzone High-Dose 65+yrs (0745-3149)        Follow heart healthy/low salt/diabetic diet  Avoid processed foods  Monitor blood pressure and BS as discussed  BP is well controlled with addition of amlodipine to lisinopril  Exercise as tolerated up to 150 minutes per week  Take all medications as prescribed  See eye doctor annually or as discussed  Wear protective foot wear/no bare feet  Check feet regularly for calluses or ulcers  Last A1c was 5.5  6 months ago, encourage patient to continue following healthy diet  Continue omeprazole for underlying GERD symptoms, currently well controlled  Patient is having  symptoms of atrial fibrillation with intermittent shortness of breath and fatigue, currently on sotalol and Eliquis.  Plan is cardioversion if he does not convert to sinus rhythm within next several months per cardiologist note  Recent sleep study reveals evidence of sleep apnea, he will likely be contacted soon by sleep doctor for arrangement of CPAP machine, explained this to patient today  Continue atorvastatin for hyperlipidemia  Flu vaccine provided today    Return in about 6 months (around 6/16/2022) for Medicare Wellness.      Marilyn K. Vermeesch, MD      Please note that portions of this note were completed with a voice recognition program.  Efforts were made to edit dictation, but occasionally words are mistranscribed.

## 2021-12-27 RX ORDER — LISINOPRIL 40 MG/1
TABLET ORAL
Qty: 90 TABLET | Refills: 1 | Status: SHIPPED | OUTPATIENT
Start: 2021-12-27 | End: 2022-09-21

## 2022-01-03 ENCOUNTER — OFFICE VISIT (OUTPATIENT)
Dept: SLEEP MEDICINE | Facility: CLINIC | Age: 72
End: 2022-01-03

## 2022-01-03 VITALS
BODY MASS INDEX: 35.3 KG/M2 | WEIGHT: 246.6 LBS | OXYGEN SATURATION: 97 % | HEIGHT: 70 IN | HEART RATE: 105 BPM | DIASTOLIC BLOOD PRESSURE: 93 MMHG | SYSTOLIC BLOOD PRESSURE: 151 MMHG

## 2022-01-03 DIAGNOSIS — R06.83 SNORING: Primary | ICD-10-CM

## 2022-01-03 DIAGNOSIS — E66.09 CLASS 2 OBESITY DUE TO EXCESS CALORIES WITHOUT SERIOUS COMORBIDITY WITH BODY MASS INDEX (BMI) OF 35.0 TO 35.9 IN ADULT: ICD-10-CM

## 2022-01-03 DIAGNOSIS — G47.33 OBSTRUCTIVE SLEEP APNEA, ADULT: ICD-10-CM

## 2022-01-03 PROCEDURE — 99213 OFFICE O/P EST LOW 20 MIN: CPT | Performed by: INTERNAL MEDICINE

## 2022-01-11 DIAGNOSIS — I48.91 NEW ONSET ATRIAL FIBRILLATION: ICD-10-CM

## 2022-01-12 RX ORDER — APIXABAN 5 MG/1
TABLET, FILM COATED ORAL
Qty: 60 TABLET | OUTPATIENT
Start: 2022-01-12

## 2022-01-12 RX ORDER — METOPROLOL SUCCINATE 25 MG/1
TABLET, EXTENDED RELEASE ORAL
Qty: 30 TABLET | Refills: 0 | OUTPATIENT
Start: 2022-01-12

## 2022-01-12 NOTE — TELEPHONE ENCOUNTER
Patient to see his cardiologist, Dr. Vincenzo mccauley for continued management.  Patient currently is not following in the [facility] valve [facility].

## 2022-01-19 NOTE — PROGRESS NOTES
"Chief Complaint  Follow-up of snoring and nonrestorative sleep    Subjective         Erik Arcos presents to [facility] for the evaluation of snoring and nonrestorative sleep his primary care physician is Dr.Vermeesch.  He is also seen by Dr. Wen and Martín MAGALLANES  History of Present Illness  Patient was last seen in clinic on September 27.  He has a history of snoring and nonrestorative sleep.  He also have a history of atrial fibrillation hyperlipidemia and obesity.  He had a sleep study on December 13.  He says he has been sleeping about the same since September.  He still has occasional snoring and apneas noted.  He has been noted to move occasionally in his sleep.  He denies any changes in his weight.  He thought his study night was fairly usual.    Review of Systems   Constitutional: Negative.    HENT: Negative.    Eyes: Negative.    Respiratory: Negative.    Cardiovascular: Negative.    Gastrointestinal: Negative.    Endocrine: Negative.    Genitourinary: Negative.    Musculoskeletal: Negative.    Skin: Negative.    Allergic/Immunologic: Negative.    Neurological: Positive for dizziness.   Hematological: Bruises/bleeds easily.   Psychiatric/Behavioral: Negative    Keller score is 8/24  Objective   Vital Signs:   /93 (BP Location: Left arm, Patient Position: Sitting, Cuff Size: Adult)   Pulse 105   Ht 177.8 cm (70\")   Wt 112 kg (246 lb 9.6 oz)   SpO2 97%   BMI 35.38 kg/m²     Physical Exam patient appears to be awake and alert.  He is not appear to be in acute respiratory distress.    He is normocephalic.  He has Mallampati class III anatomy.    Lungs are clear.    Cardiac exam revealed an irregularly irregular rhythm    Extremities showed no edema.  Result Review :    Sleep study on December 13 showed an AHI of 52.5.  He has been 190.8 minutes in the desaturated state     Assessment and Plan   Diagnoses and all orders for this visit:    1. Snoring " (Primary)  -     Detailed AutoPAP Order    2. Obstructive sleep apnea, adult  -     Detailed AutoPAP Order    3. Class 2 obesity due to excess calories without serious comorbidity with body mass index (BMI) of 35.0 to 35.9 in adult    Patient has severe obstructive sleep apnea.  We have discussed potential therapies again including CPAP, weight control, oral appliance, and surgery.  We have again discussed the long-term consequences of untreated obstructive sleep apnea.  These include hypertension, diabetes, heart disease, stroke, and dementia.  After discussion he wishes to try CPAP.  We will place orders for an AutoSet device with heated humidity and nasal mask.  We will plan to see him back in 2 months and we can download the machine to be certain it is correcting his respiratory events.  If he tolerates therapy we should also consider doing overnight oximetry to be certain or correcting nocturnal hypoxemia.  He declines referral to a dietitian.  I spent 25 minutes caring for Erik on this date of service. This time includes time spent by me in the following activities:reviewing tests, obtaining and/or reviewing a separately obtained history, performing a medically appropriate examination and/or evaluation , counseling and educating the patient/family/caregiver, ordering medications, tests, or procedures and documenting information in the medical record  Follow Up  Return in about 2 months (around 3/3/2022) for 31 to 90 days after PAP setup, Next scheduled follow-up.  Patient was given instructions and counseling regarding his condition or for health maintenance advice. Please see specific information pulled into the AVS if appropriate.   Jake Laura MD [facility]  Sleep Medicine  Pulmonary and Critical Care Medicine

## 2022-01-24 ENCOUNTER — OFFICE VISIT (OUTPATIENT)
Dept: CARDIOLOGY | Facility: CLINIC | Age: 72
End: 2022-01-24

## 2022-01-24 VITALS
HEIGHT: 70 IN | DIASTOLIC BLOOD PRESSURE: 72 MMHG | OXYGEN SATURATION: 96 % | BODY MASS INDEX: 35.22 KG/M2 | HEART RATE: 89 BPM | SYSTOLIC BLOOD PRESSURE: 120 MMHG | WEIGHT: 246 LBS

## 2022-01-24 DIAGNOSIS — I48.19 PERSISTENT ATRIAL FIBRILLATION: Primary | ICD-10-CM

## 2022-01-24 DIAGNOSIS — I10 PRIMARY HYPERTENSION: ICD-10-CM

## 2022-01-24 DIAGNOSIS — E11.3293 TYPE 2 DIABETES MELLITUS WITH BOTH EYES AFFECTED BY MILD NONPROLIFERATIVE RETINOPATHY WITHOUT MACULAR EDEMA, WITHOUT LONG-TERM CURRENT USE OF INSULIN: ICD-10-CM

## 2022-01-24 PROCEDURE — 93000 ELECTROCARDIOGRAM COMPLETE: CPT | Performed by: INTERNAL MEDICINE

## 2022-01-24 PROCEDURE — 99213 OFFICE O/P EST LOW 20 MIN: CPT | Performed by: INTERNAL MEDICINE

## 2022-01-24 NOTE — PROGRESS NOTES
Subjective:     Encounter Date:01/24/2022      Patient ID: Erik Arcos is a 71 y.o. male.    Chief Complaint: Atrial fibrillation  HPI  This is a 71-year-old male patient with recently diagnosed atrial fibrillation.  He has been anticoagulated with Eliquis without bleeding complication.  He was previously started on antiarrhythmic drug therapy with sotalol 80 mg orally twice daily but has not pharmacologically cardioverted over the last 2 weeks.  The patient indicates that he has had no change in symptoms.  He reports compliance with his medications.  The following portions of the patient's history were reviewed and updated as appropriate: allergies, current medications, past family history, past medical history, past social history, past surgical history and problem  Review of Systems   Constitutional: Negative for chills, diaphoresis, fever, malaise/fatigue, weight gain and weight loss.   HENT: Negative for ear discharge, hearing loss, hoarse voice and nosebleeds.    Eyes: Negative for discharge, double vision, pain and photophobia.   Cardiovascular: Negative for chest pain, claudication, cyanosis, dyspnea on exertion, irregular heartbeat, leg swelling, near-syncope, orthopnea, palpitations, paroxysmal nocturnal dyspnea and syncope.   Respiratory: Negative for cough, hemoptysis, shortness of breath, sputum production and wheezing.    Endocrine: Negative for cold intolerance, heat intolerance, polydipsia, polyphagia and polyuria.   Hematologic/Lymphatic: Negative for adenopathy and bleeding problem. Does not bruise/bleed easily.   Skin: Negative for color change, flushing, itching and rash.   Musculoskeletal: Negative for muscle cramps, muscle weakness, myalgias and stiffness.   Gastrointestinal: Negative for abdominal pain, diarrhea, hematemesis, hematochezia, nausea and vomiting.   Genitourinary: Negative for dysuria, frequency and nocturia.   Neurological: Negative for focal weakness, loss of balance,  numbness, paresthesias and seizures.   Psychiatric/Behavioral: Negative for altered mental status, hallucinations and suicidal ideas.   Allergic/Immunologic: Negative for HIV exposure, hives and persistent infections.           Current Outpatient Medications:   •  amLODIPine (NORVASC) 5 MG tablet, Take 1 tablet by mouth Daily., Disp: 30 tablet, Rfl: 11  •  apixaban (ELIQUIS) 5 MG tablet tablet, Take 1 tablet by mouth 2 (Two) Times a Day., Disp: 120 tablet, Rfl: 3  •  atorvastatin (LIPITOR) 20 MG tablet, TAKE 1 TABLET DAILY, Disp: 90 tablet, Rfl: 3  •  Cholecalciferol (VITAMIN D-3) 1000 UNITS capsule, Take  by mouth Daily., Disp: , Rfl:   •  lisinopril (PRINIVIL,ZESTRIL) 40 MG tablet, TAKE 1 TABLET DAILY, Disp: 90 tablet, Rfl: 1  •  omeprazole (priLOSEC) 20 MG capsule, Take 1 capsule by mouth Daily., Disp: 90 capsule, Rfl: 1  •  sildenafil (VIAGRA) 100 MG tablet, Take 1 tablet by mouth Daily As Needed for Erectile Dysfunction., Disp: 25 tablet, Rfl: 1  •  Sotalol HCl AF 80 MG tablet, Take 1 tablet by mouth 2 (Two) Times a Day., Disp: 60 tablet, Rfl: 11    Objective:   Vitals and nursing note reviewed.   Constitutional:       Appearance: Healthy appearance. Not in distress.   Neck:      Vascular: No JVR. JVD normal.   Pulmonary:      Effort: Pulmonary effort is normal.      Breath sounds: Normal breath sounds. No wheezing. No rhonchi. No rales.   Chest:      Chest wall: Not tender to palpatation.   Cardiovascular:      PMI at left midclavicular line. Normal rate. Irregularly irregular rhythm. Normal S1. Normal S2.      Murmurs: There is no murmur.      No gallop. No click. No rub.   Pulses:     Intact distal pulses.   Edema:     Peripheral edema absent.   Abdominal:      General: Bowel sounds are normal.      Palpations: Abdomen is soft.      Tenderness: There is no abdominal tenderness.   Musculoskeletal: Normal range of motion.         General: No tenderness. Skin:     General: Skin is warm and dry.   Neurological:  "     General: No focal deficit present.      Mental Status: Alert and oriented to person, place and time.       Blood pressure 120/72, pulse 89, height 177.8 cm (70\"), weight 112 kg (246 lb), SpO2 96 %.   Lab Review:     Assessment:       1. Persistent atrial fibrillation (HCC)      2. Primary hypertension  Acceptable blood pressure control      ECG 12 Lead    Date/Time: 1/24/2022 3:03 PM  Performed by: Vincenzo Wen MD  Authorized by: Vincenzo Wen MD   Comparison: compared with previous ECG   Similar to previous ECG  Rhythm: sinus rhythm  Rate: normal  QRS axis: left  Other findings: non-specific ST-T wave changes  Comments: AAD Rx            Plan:     Advance Care Planning   ACP discussion was held with the patient during this visit. Patient has an advance directive (not in EMR), copy requested.    I have recommended synchronized external cardioversion.  Mr. Arcos has been engaged in a patient level discussion explaining the rationale for proceeding with this procedure.  The procedure of cardioversion has been explained in detail to the patient and his wife including risks benefits and alternatives.  He expresses understanding and desire to proceed.  No changes in medications have been made at today's visit.  "

## 2022-02-01 ENCOUNTER — HOSPITAL ENCOUNTER (OUTPATIENT)
Dept: CARDIOLOGY | Facility: HOSPITAL | Age: 72
Discharge: HOME | End: 2022-02-01
Admitting: INTERNAL MEDICINE

## 2022-02-01 VITALS
RESPIRATION RATE: 18 BRPM | SYSTOLIC BLOOD PRESSURE: 108 MMHG | HEIGHT: 70 IN | HEART RATE: 63 BPM | WEIGHT: 244 LBS | OXYGEN SATURATION: 95 % | BODY MASS INDEX: 34.93 KG/M2 | DIASTOLIC BLOOD PRESSURE: 69 MMHG

## 2022-02-01 LAB
MAXIMAL PREDICTED HEART RATE: 147 BPM
QT INTERVAL: 416 MS
QT INTERVAL: 478 MS
QTC INTERVAL: 479 MS
QTC INTERVAL: 505 MS
STRESS TARGET HR: 125 BPM

## 2022-02-01 PROCEDURE — 93005 ELECTROCARDIOGRAM TRACING: CPT | Performed by: INTERNAL MEDICINE

## 2022-02-01 PROCEDURE — 92960 CARDIOVERSION ELECTRIC EXT: CPT | Performed by: INTERNAL MEDICINE

## 2022-02-01 PROCEDURE — 92960 CARDIOVERSION ELECTRIC EXT: CPT

## 2022-02-01 PROCEDURE — 25010000002 MIDAZOLAM PER 1 MG: Performed by: INTERNAL MEDICINE

## 2022-02-01 PROCEDURE — 25010000002 FENTANYL CITRATE (PF) 50 MCG/ML SOLUTION: Performed by: INTERNAL MEDICINE

## 2022-02-01 RX ORDER — SODIUM CHLORIDE 0.9 % (FLUSH) 0.9 %
10 SYRINGE (ML) INJECTION EVERY 12 HOURS SCHEDULED
Status: DISCONTINUED | OUTPATIENT
Start: 2022-02-01 | End: 2022-02-02 | Stop reason: HOSPADM

## 2022-02-01 RX ORDER — MIDAZOLAM HYDROCHLORIDE 1 MG/ML
INJECTION INTRAMUSCULAR; INTRAVENOUS
Status: COMPLETED | OUTPATIENT
Start: 2022-02-01 | End: 2022-02-01

## 2022-02-01 RX ORDER — FENTANYL CITRATE 50 UG/ML
INJECTION, SOLUTION INTRAMUSCULAR; INTRAVENOUS
Status: COMPLETED | OUTPATIENT
Start: 2022-02-01 | End: 2022-02-01

## 2022-02-01 RX ORDER — SODIUM CHLORIDE 0.9 % (FLUSH) 0.9 %
10 SYRINGE (ML) INJECTION AS NEEDED
Status: DISCONTINUED | OUTPATIENT
Start: 2022-02-01 | End: 2022-02-02 | Stop reason: HOSPADM

## 2022-02-01 RX ADMIN — MIDAZOLAM HYDROCHLORIDE 2 MG: 1 INJECTION, SOLUTION INTRAMUSCULAR; INTRAVENOUS at 09:32

## 2022-02-01 RX ADMIN — MIDAZOLAM HYDROCHLORIDE 2 MG: 1 INJECTION, SOLUTION INTRAMUSCULAR; INTRAVENOUS at 09:29

## 2022-02-01 RX ADMIN — FENTANYL CITRATE 25 MCG: 50 INJECTION, SOLUTION INTRAMUSCULAR; INTRAVENOUS at 09:32

## 2022-02-01 RX ADMIN — FENTANYL CITRATE 50 MCG: 50 INJECTION, SOLUTION INTRAMUSCULAR; INTRAVENOUS at 09:28

## 2022-02-24 ENCOUNTER — OFFICE VISIT (OUTPATIENT)
Dept: CARDIOLOGY | Facility: CLINIC | Age: 72
End: 2022-02-24

## 2022-02-24 VITALS
WEIGHT: 248.8 LBS | BODY MASS INDEX: 35.62 KG/M2 | HEART RATE: 99 BPM | SYSTOLIC BLOOD PRESSURE: 130 MMHG | DIASTOLIC BLOOD PRESSURE: 72 MMHG | HEIGHT: 70 IN | OXYGEN SATURATION: 97 %

## 2022-02-24 DIAGNOSIS — I48.19 PERSISTENT ATRIAL FIBRILLATION: Primary | ICD-10-CM

## 2022-02-24 DIAGNOSIS — E66.09 CLASS 1 OBESITY DUE TO EXCESS CALORIES WITH SERIOUS COMORBIDITY AND BODY MASS INDEX (BMI) OF 32.0 TO 32.9 IN ADULT: ICD-10-CM

## 2022-02-24 DIAGNOSIS — I10 PRIMARY HYPERTENSION: ICD-10-CM

## 2022-02-24 PROCEDURE — 93000 ELECTROCARDIOGRAM COMPLETE: CPT | Performed by: INTERNAL MEDICINE

## 2022-02-24 PROCEDURE — 99213 OFFICE O/P EST LOW 20 MIN: CPT | Performed by: INTERNAL MEDICINE

## 2022-02-24 NOTE — PROGRESS NOTES
"    Subjective:     Encounter Date:02/24/2022      Patient ID: Erik Arcos is a 71 y.o. male.    Chief Complaint: Atrial fibrillation  HPI  This is a 71-year-old male patient who recently underwent successful outpatient synchronized external cardioversion.  The patient returns today for routine follow-up after his cardioversion.  The patient indicates that he has been asymptomatic from a cardiovascular perspective since his cardioversion.  He was unaware that his heart had \"gone back out of rhythm\".  He is anticoagulated with Eliquis therapy.  He has been tolerating antiarrhythmic drug therapy with sotalol without evidence of side effects or proarrhythmia.  Twelve-lead electrocardiogram today shows no evidence of QT prolongation.  He has had no bleeding complications related to anticoagulation therapy.  He has had no signs or symptoms to suggest stroke, TIA or other cardioembolic event.  He is a non-smoker.  The following portions of the patient's history were reviewed and updated as appropriate: allergies, current medications, past family history, past medical history, past social history, past surgical history and problem  Review of Systems   Constitutional: Negative for chills, diaphoresis, fever, malaise/fatigue, weight gain and weight loss.   HENT: Negative for ear discharge, hearing loss, hoarse voice and nosebleeds.    Eyes: Negative for discharge, double vision, pain and photophobia.   Cardiovascular: Negative for chest pain, claudication, cyanosis, dyspnea on exertion, irregular heartbeat, leg swelling, near-syncope, orthopnea, palpitations, paroxysmal nocturnal dyspnea and syncope.   Respiratory: Negative for cough, hemoptysis, shortness of breath, sputum production and wheezing.    Endocrine: Negative for cold intolerance, heat intolerance, polydipsia, polyphagia and polyuria.   Hematologic/Lymphatic: Negative for adenopathy and bleeding problem. Does not bruise/bleed easily.   Skin: Negative for " color change, flushing, itching and rash.   Musculoskeletal: Negative for muscle cramps, muscle weakness, myalgias and stiffness.   Gastrointestinal: Negative for abdominal pain, diarrhea, hematemesis, hematochezia, nausea and vomiting.   Genitourinary: Negative for dysuria, frequency and nocturia.   Neurological: Negative for focal weakness, loss of balance, numbness, paresthesias and seizures.   Psychiatric/Behavioral: Negative for altered mental status, hallucinations and suicidal ideas.   Allergic/Immunologic: Negative for HIV exposure, hives and persistent infections.           Current Outpatient Medications:   •  amLODIPine (NORVASC) 5 MG tablet, Take 1 tablet by mouth Daily., Disp: 30 tablet, Rfl: 11  •  apixaban (ELIQUIS) 5 MG tablet tablet, Take 1 tablet by mouth 2 (Two) Times a Day., Disp: 120 tablet, Rfl: 3  •  atorvastatin (LIPITOR) 20 MG tablet, TAKE 1 TABLET DAILY, Disp: 90 tablet, Rfl: 3  •  Cholecalciferol (VITAMIN D-3) 1000 UNITS capsule, Take  by mouth Daily., Disp: , Rfl:   •  lisinopril (PRINIVIL,ZESTRIL) 40 MG tablet, TAKE 1 TABLET DAILY, Disp: 90 tablet, Rfl: 1  •  omeprazole (priLOSEC) 20 MG capsule, Take 1 capsule by mouth Daily., Disp: 90 capsule, Rfl: 1  •  sildenafil (VIAGRA) 100 MG tablet, Take 1 tablet by mouth Daily As Needed for Erectile Dysfunction., Disp: 25 tablet, Rfl: 1  •  Sotalol HCl AF 80 MG tablet, Take 1 tablet by mouth 2 (Two) Times a Day., Disp: 60 tablet, Rfl: 11    Objective:   Vitals and nursing note reviewed.   Constitutional:       Appearance: Healthy appearance. Not in distress.   Neck:      Vascular: No JVR. JVD normal.   Pulmonary:      Effort: Pulmonary effort is normal.      Breath sounds: Normal breath sounds. No wheezing. No rhonchi. No rales.   Chest:      Chest wall: Not tender to palpatation.   Cardiovascular:      PMI at left midclavicular line. Normal rate. Regular rhythm. Normal S1. Normal S2.      Murmurs: There is no murmur.      No gallop. No click. No  "rub.   Pulses:     Intact distal pulses.   Edema:     Peripheral edema absent.   Abdominal:      General: Bowel sounds are normal.      Palpations: Abdomen is soft.      Tenderness: There is no abdominal tenderness.   Musculoskeletal: Normal range of motion.         General: No tenderness. Skin:     General: Skin is warm and dry.   Neurological:      General: No focal deficit present.      Mental Status: Alert and oriented to person, place and time.       Blood pressure 130/72, pulse 99, height 177.8 cm (70\"), weight 113 kg (248 lb 12.8 oz), SpO2 97 %.   Lab Review:     Assessment:       1. Persistent atrial fibrillation (HCC)  The patient has developed recurrent atrial fibrillation with controlled ventricular response less than 1 month after an otherwise successful outpatient elective synchronized external cardioversion.    2. Class 1 obesity due to excess calories with serious comorbidity and body mass index (BMI) of 32.0 to 32.9 in adult        ECG 12 Lead    Date/Time: 2/24/2022 4:31 PM  Performed by: Vincenzo Wen MD  Authorized by: Vincenzo Wen MD   Comparison: compared with previous ECG   Similar to previous ECG  Rhythm: atrial fibrillation  Rate: normal  QRS axis: normal  Other findings: non-specific ST-T wave changes    Clinical impression: abnormal EKG  Comments: No QT prolongation.  On sotalol therapy.            Plan:     Advance Care Planning   ACP discussion was held with the patient during this visit. Patient has an advance directive (not in EMR), copy requested.    I have recommended outpatient referral to electrophysiology to consider the option of pulmonary vein isolation radiofrequency ablation versus a trial of alternative antiarrhythmic drug therapy.  The patient is instructed to continue his current medications unchanged until he has had a formal EP evaluation.    "

## 2022-03-11 NOTE — PROGRESS NOTES
Electrophysiology Clinic Consult     Erik Arcos  1950 03/16/22    DATE OF ADMISSION: (Not on file)  [facility] CARDIOLOGY    Vermeesch, Marilyn K, MD  [address] / [facility] [address]    Chief Complaint   Patient presents with   • Atrial Fibrillation   • Shortness of Breath     Referring: Dr Wen     CC: Atrial Fibrillation     Problem List:   1. Persistent Atrial Fibrillation   a. CHADSVASC: 2  b. Diagnosed incidentally in the setting of pre op for cataract surgery   c. Echo: 10/28/21: Normal LVEF, Mild concentric LVH, severe LA dilation   d. Stress Test 10/28/21: normal ,myocardial perfusion imaging no evidence of ischemia. There is a coronary artery calcification on the CT portion of the stress test. Findings consistent with low risk study  e. 2/1/22 ECV with recurrence within 1 month while on Sotalol.   2. HTN   3. HLD  4. GERD  5. LAUREL: awaiting CPAP  6. Surgical History  a. Ankle surgery  2010       History of Present Illness:   Mr. Arcos is a pleasant 71 year old male from [place], [place]. He works as a . He was referred by Dr. eWn for consultation for atrial fibrillation. He reports he was incidentally diagnosed with AF in the setting of a cataracts surgery. The cataract procedure was aborted. He says at that time he did not have symptoms. He later underwent stress and echo which were both essentially normal except for severely dilated LA on Echo. He was started on sotalol 80mg  and underwent ECV 2/1/22.  He states that after the cardioversion he did feel better in terms of energy and breathing for 2-3 days after. Then he went back into AF. His Hr at home has been in the 80-90 range.  He has been anticoagulated with  Eliquis 5mg BIDno bleeding issues . No prior TIA/ CVA     LAUREL: awaiting CPAP   TSH: checked per PCP in the last year  - normal   ETOH; none  TOB; none   No Known Allergies        Current Outpatient Medications:   •  amLODIPine  (NORVASC) 5 MG tablet, Take 1 tablet by mouth Daily., Disp: 30 tablet, Rfl: 11  •  apixaban (ELIQUIS) 5 MG tablet tablet, Take 1 tablet by mouth 2 (Two) Times a Day., Disp: 120 tablet, Rfl: 3  •  atorvastatin (LIPITOR) 20 MG tablet, TAKE 1 TABLET DAILY, Disp: 90 tablet, Rfl: 3  •  Cholecalciferol (VITAMIN D-3) 1000 UNITS capsule, Take  by mouth Daily., Disp: , Rfl:   •  lisinopril (PRINIVIL,ZESTRIL) 40 MG tablet, TAKE 1 TABLET DAILY, Disp: 90 tablet, Rfl: 1  •  Misc Natural Products (OSTEO BI-FLEX JOINT SHIELD PO), Take  by mouth., Disp: , Rfl:   •  omeprazole (priLOSEC) 20 MG capsule, Take 1 capsule by mouth Daily., Disp: 90 capsule, Rfl: 1  •  sildenafil (VIAGRA) 100 MG tablet, Take 1 tablet by mouth Daily As Needed for Erectile Dysfunction., Disp: 25 tablet, Rfl: 1  •  Sotalol HCl AF 80 MG tablet, Take 1 tablet by mouth 2 (Two) Times a Day., Disp: 60 tablet, Rfl: 11    Social History     Socioeconomic History   • Marital status:    Tobacco Use   • Smoking status: Former Smoker   • Smokeless tobacco: Never Used   Substance and Sexual Activity   • Alcohol use: No   • Drug use: No   • Sexual activity: Defer       Family History   Problem Relation Age of Onset   • Cancer Mother    • No Known Problems Father    • No Known Problems Sister    • Diabetes Half-Sister    • No Known Problems Half-Brother        REVIEW OF SYSTEMS:   CONST:  No weight loss, fever, chills, weakness or +fatigue.   HEENT:  No visual loss, blurred vision, double vision, yellow sclerae.                   No hearing loss, congestion, sore throat.   SKIN:      No rashes, urticaria, ulcers, sores.     RESP:     + shortness of breath, hemoptysis, cough, sputum.   GI:           No anorexia, nausea, vomiting, diarrhea. No abdominal pain, melena.   :         No burning on urination, hematuria or increased frequency.  ENDO:    No diaphoresis, cold or heat intolerance. No polyuria or polydipsia.   NEURO:  No headache, dizziness, syncope,  "paralysis, ataxia, or parasthesias.                  No change in bowel or bladder control. No history of CVA/TIA  MUSC:    No muscle, back pain, joint pain or stiffness.   HEME:    No anemia, bleeding, bruising. No history of DVT/PE.  PSYCH:  No history of depression, anxiety    Vitals:    03/16/22 1442   BP: 130/68   Pulse: 112   SpO2: 98%   Weight: 110 kg (243 lb)   Height: 177.8 cm (70\")     Physical Exam:  GEN: Well nourished, well-developed, no acute distress- overweight per BMI   HEENT: Normocephalic, atraumatic, PERRLA, moist mucous membranes  NECK: Supple, NO JVD, no thyromegaly, no lymphadenopathy  CARD: irregular irreg rate and rhythm Nl S1 S2 no murmur, gallop, rub  LUNGS: Clear to auscultation, normal respiratory effort  ABDOMEN: Soft, nontender, normal bowel sounds  EXTREMITIES: No gross deformities, no clubbing, cyanosis, or edema  SKIN: Warm, dry  NEURO: No focal deficits, alert and oriented x 3  PSYCHIATRIC: Normal affect and mood      I personally viewed and interpreted the patient's EKG/Telemetry/lab data    Lab Results   Component Value Date    GLUCOSE 110 (H) 06/17/2021    CALCIUM 9.9 06/17/2021     06/17/2021    K 5.1 06/17/2021    CO2 28.3 06/17/2021    CL 98 06/17/2021    BUN 12 06/17/2021    CREATININE 0.79 06/17/2021    EGFRIFAFRI 117 06/17/2021    EGFRIFNONA 96 06/17/2021    BCR 15.2 06/17/2021    ANIONGAP 5.0 08/30/2016     Lab Results   Component Value Date    WBC 6.48 06/17/2021    HGB 14.6 06/17/2021    HCT 44.0 06/17/2021    MCV 88.0 06/17/2021     06/17/2021     No results found for: INR, PROTIME  Lab Results   Component Value Date    TSH 3.070 06/16/2020       ECG 12 Lead    Date/Time: 3/16/2022 3:22 PM  Performed by: Jena Densno APRN  Authorized by: Jena Denson APRN   Comparison: compared with previous ECG from 2/24/2022  Similar to previous ECG  Rhythm: atrial fibrillation  BPM: 94                ICD-10-CM ICD-9-CM   1. Persistent atrial fibrillation (HCC)  " I48.19 427.31   2. Primary hypertension  I10 401.9   3. LAUREL (obstructive sleep apnea)  G47.33 327.23       Assessment and Plan:   1. Persistent  Atrial Fibrillation   -Elevated [id number] of  2 (age,HTN) - continue Eliquis 5mg BID   -Recent echo with LVEF 60% and severely dilated LA, Recent stress test which was a low risk study  -S/P ECV in Feb. He did feel better in terms of his energy and breathing for the few days he was in NSR and would like to pursue NSR   -Treatment options including medications vs catheter ablation have been reviewed with detail. He is in favor of proceeding with ablation. The risks, benefits, and alternatives of the procedure have been reviewed and the patient wishes to proceed. Pt wants to proceed with tikosyn first due to the wait list for pulmonary vein ablations.  We will discontinue his sotalol 5 days before and initiate Lopressor 50 mg p.o. twice daily.    2. HTN - acceptable control    3. LAUREL: The CPAP he needs is backordered- they will check into this again.     4.Overweight- importance of weight loss reviewed.     Scribed for Galo Edwards MD by SONA Hernandez. 3/16/2022  15:25 EDT     I, Galo Edwards MD, personally performed the services described in this documentation as scribed by the above named individual in my presence, and it is both accurate and complete.  3/16/2022  15:51 EDT

## 2022-03-16 ENCOUNTER — OFFICE VISIT (OUTPATIENT)
Dept: CARDIOLOGY | Facility: CLINIC | Age: 72
End: 2022-03-16

## 2022-03-16 VITALS
OXYGEN SATURATION: 98 % | DIASTOLIC BLOOD PRESSURE: 68 MMHG | SYSTOLIC BLOOD PRESSURE: 130 MMHG | BODY MASS INDEX: 34.79 KG/M2 | HEIGHT: 70 IN | HEART RATE: 112 BPM | WEIGHT: 243 LBS

## 2022-03-16 DIAGNOSIS — G47.33 OSA (OBSTRUCTIVE SLEEP APNEA): ICD-10-CM

## 2022-03-16 DIAGNOSIS — I48.19 PERSISTENT ATRIAL FIBRILLATION: Primary | ICD-10-CM

## 2022-03-16 DIAGNOSIS — I10 PRIMARY HYPERTENSION: ICD-10-CM

## 2022-03-16 PROCEDURE — 99214 OFFICE O/P EST MOD 30 MIN: CPT | Performed by: INTERNAL MEDICINE

## 2022-03-16 PROCEDURE — 93000 ELECTROCARDIOGRAM COMPLETE: CPT | Performed by: NURSE PRACTITIONER

## 2022-03-16 RX ORDER — METOPROLOL TARTRATE 50 MG/1
50 TABLET, FILM COATED ORAL 2 TIMES DAILY
Qty: 60 TABLET | Refills: 0 | Status: SHIPPED | OUTPATIENT
Start: 2022-03-16 | End: 2022-04-25

## 2022-04-11 RX ORDER — METOPROLOL TARTRATE 50 MG/1
TABLET, FILM COATED ORAL
Qty: 60 TABLET | Refills: 0 | OUTPATIENT
Start: 2022-04-11

## 2022-04-14 ENCOUNTER — PREP FOR SURGERY (OUTPATIENT)
Dept: OTHER | Facility: HOSPITAL | Age: 72
End: 2022-04-14

## 2022-04-14 DIAGNOSIS — I48.19 PERSISTENT ATRIAL FIBRILLATION: Primary | ICD-10-CM

## 2022-04-14 RX ORDER — MAGNESIUM SULFATE HEPTAHYDRATE 40 MG/ML
4 INJECTION, SOLUTION INTRAVENOUS AS NEEDED
Status: CANCELLED | OUTPATIENT
Start: 2022-04-14

## 2022-04-14 RX ORDER — MAGNESIUM SULFATE HEPTAHYDRATE 40 MG/ML
2 INJECTION, SOLUTION INTRAVENOUS AS NEEDED
Status: CANCELLED | OUTPATIENT
Start: 2022-04-14

## 2022-04-14 RX ORDER — POTASSIUM CHLORIDE 1.5 G/1.77G
40 POWDER, FOR SOLUTION ORAL AS NEEDED
Status: CANCELLED | OUTPATIENT
Start: 2022-04-14

## 2022-04-14 RX ORDER — POTASSIUM CHLORIDE 750 MG/1
40 CAPSULE, EXTENDED RELEASE ORAL AS NEEDED
Status: CANCELLED | OUTPATIENT
Start: 2022-04-14

## 2022-04-15 ENCOUNTER — APPOINTMENT (OUTPATIENT)
Dept: PREADMISSION TESTING | Facility: HOSPITAL | Age: 72
End: 2022-04-15

## 2022-04-17 ENCOUNTER — APPOINTMENT (OUTPATIENT)
Dept: PREADMISSION TESTING | Facility: HOSPITAL | Age: 72
End: 2022-04-17

## 2022-04-17 LAB — SARS-COV-2 RNA PNL SPEC NAA+PROBE: NOT DETECTED

## 2022-04-17 PROCEDURE — U0004 COV-19 TEST NON-CDC HGH THRU: HCPCS

## 2022-04-17 PROCEDURE — C9803 HOPD COVID-19 SPEC COLLECT: HCPCS

## 2022-04-17 PROCEDURE — U0005 INFEC AGEN DETEC AMPLI PROBE: HCPCS

## 2022-04-19 ENCOUNTER — HOSPITAL ENCOUNTER (OUTPATIENT)
Facility: HOSPITAL | Age: 72
Discharge: HOME | End: 2022-04-19
Attending: INTERNAL MEDICINE | Admitting: INTERNAL MEDICINE

## 2022-04-19 ENCOUNTER — PREP FOR SURGERY (OUTPATIENT)
Dept: OTHER | Facility: HOSPITAL | Age: 72
End: 2022-04-19

## 2022-04-19 VITALS
TEMPERATURE: 97 F | WEIGHT: 225 LBS | HEIGHT: 72 IN | RESPIRATION RATE: 16 BRPM | OXYGEN SATURATION: 95 % | DIASTOLIC BLOOD PRESSURE: 79 MMHG | BODY MASS INDEX: 30.48 KG/M2 | HEART RATE: 84 BPM | SYSTOLIC BLOOD PRESSURE: 132 MMHG

## 2022-04-19 DIAGNOSIS — I48.19 PERSISTENT ATRIAL FIBRILLATION: ICD-10-CM

## 2022-04-19 LAB
ANION GAP SERPL CALCULATED.3IONS-SCNC: 9 MMOL/L (ref 5–15)
APTT PPP: 29.5 SECONDS (ref 22–39)
BUN SERPL-MCNC: 12 MG/DL (ref 8–23)
BUN/CREAT SERPL: 16.2 (ref 7–25)
CA-I SERPL ISE-MCNC: 1.28 MMOL/L (ref 1.12–1.32)
CALCIUM SPEC-SCNC: 9.2 MG/DL (ref 8.6–10.5)
CHLORIDE SERPL-SCNC: 101 MMOL/L (ref 98–107)
CO2 SERPL-SCNC: 25 MMOL/L (ref 22–29)
CREAT SERPL-MCNC: 0.74 MG/DL (ref 0.76–1.27)
EGFRCR SERPLBLD CKD-EPI 2021: 95.7 ML/MIN/1.73
GLUCOSE SERPL-MCNC: 100 MG/DL (ref 65–99)
INR PPP: 1.03 (ref 0.84–1.13)
MAGNESIUM SERPL-MCNC: 2.2 MG/DL (ref 1.6–2.4)
POTASSIUM SERPL-SCNC: 4 MMOL/L (ref 3.5–5.2)
PROTHROMBIN TIME: 13.4 SECONDS (ref 11.4–14.4)
SODIUM SERPL-SCNC: 135 MMOL/L (ref 136–145)

## 2022-04-19 PROCEDURE — 85730 THROMBOPLASTIN TIME PARTIAL: CPT | Performed by: NURSE PRACTITIONER

## 2022-04-19 PROCEDURE — 82330 ASSAY OF CALCIUM: CPT | Performed by: NURSE PRACTITIONER

## 2022-04-19 PROCEDURE — 83735 ASSAY OF MAGNESIUM: CPT | Performed by: NURSE PRACTITIONER

## 2022-04-19 PROCEDURE — 80048 BASIC METABOLIC PNL TOTAL CA: CPT | Performed by: NURSE PRACTITIONER

## 2022-04-19 PROCEDURE — G0378 HOSPITAL OBSERVATION PER HR: HCPCS

## 2022-04-19 PROCEDURE — 85610 PROTHROMBIN TIME: CPT | Performed by: NURSE PRACTITIONER

## 2022-04-19 RX ORDER — MAGNESIUM SULFATE HEPTAHYDRATE 40 MG/ML
4 INJECTION, SOLUTION INTRAVENOUS AS NEEDED
Status: DISCONTINUED | OUTPATIENT
Start: 2022-04-19 | End: 2022-04-19 | Stop reason: HOSPADM

## 2022-04-19 RX ORDER — POTASSIUM CHLORIDE 1.5 G/1.77G
40 POWDER, FOR SOLUTION ORAL AS NEEDED
Status: DISCONTINUED | OUTPATIENT
Start: 2022-04-19 | End: 2022-04-19 | Stop reason: HOSPADM

## 2022-04-19 RX ORDER — MAGNESIUM SULFATE HEPTAHYDRATE 40 MG/ML
2 INJECTION, SOLUTION INTRAVENOUS AS NEEDED
Status: DISCONTINUED | OUTPATIENT
Start: 2022-04-19 | End: 2022-04-19 | Stop reason: HOSPADM

## 2022-04-19 RX ORDER — POTASSIUM CHLORIDE 750 MG/1
40 CAPSULE, EXTENDED RELEASE ORAL AS NEEDED
Status: DISCONTINUED | OUTPATIENT
Start: 2022-04-19 | End: 2022-04-19 | Stop reason: HOSPADM

## 2022-04-19 NOTE — PROGRESS NOTES
Brief Progress note:    Patient arrived to be admitted for Tikosyn initiation for his persistent AF. Upon arrival it was found patient had not held sotalol 80 mg bid x 5 days as was ordered prior to admission. Unfortunately cannot safely administer Tikosyn as patient has been on sotalol and can have QT prolongation / interactions. Therefore patient will be discharged home. Discussed treatment options including coming back in next week for tikosyn initiation vs PVA. Patient at this point would like to pursue EPS +/- PVA. We will move patient up on the waitlist and plan tentatively for May procedure. Continue current medications and will need to hold sotalol x 5 days prior to procedure. VSS. Patient stable to be discharged home today. Call our office with any questions / concerns.     Vitals:    04/19/22 1616   BP: 132/79   Pulse: 84   Resp: 16   Temp: 97 °F (36.1 °C)   SpO2: 95%         Electronically signed by SONA Hendrix, 04/19/22, 4:44 PM EDT.

## 2022-04-22 ENCOUNTER — PREP FOR SURGERY (OUTPATIENT)
Dept: OTHER | Facility: HOSPITAL | Age: 72
End: 2022-04-22

## 2022-04-22 DIAGNOSIS — I48.19 PERSISTENT ATRIAL FIBRILLATION: Primary | ICD-10-CM

## 2022-04-22 RX ORDER — ACETAMINOPHEN 325 MG/1
650 TABLET ORAL EVERY 4 HOURS PRN
Status: CANCELLED | OUTPATIENT
Start: 2022-04-22

## 2022-04-22 RX ORDER — SODIUM CHLORIDE 0.9 % (FLUSH) 0.9 %
3 SYRINGE (ML) INJECTION EVERY 12 HOURS SCHEDULED
Status: CANCELLED | OUTPATIENT
Start: 2022-04-22

## 2022-04-22 RX ORDER — SODIUM CHLORIDE 0.9 % (FLUSH) 0.9 %
10 SYRINGE (ML) INJECTION AS NEEDED
Status: CANCELLED | OUTPATIENT
Start: 2022-04-22

## 2022-04-25 ENCOUNTER — HOSPITAL ENCOUNTER (OUTPATIENT)
Dept: CT IMAGING | Facility: HOSPITAL | Age: 72
Discharge: HOME | End: 2022-04-25

## 2022-04-25 ENCOUNTER — PRE-ADMISSION TESTING (OUTPATIENT)
Dept: PREADMISSION TESTING | Facility: HOSPITAL | Age: 72
End: 2022-04-25

## 2022-04-25 DIAGNOSIS — I48.19 PERSISTENT ATRIAL FIBRILLATION: ICD-10-CM

## 2022-04-25 DIAGNOSIS — I48.19 PERSISTENT ATRIAL FIBRILLATION: Primary | ICD-10-CM

## 2022-04-25 LAB
ANION GAP SERPL CALCULATED.3IONS-SCNC: 10 MMOL/L (ref 5–15)
BUN SERPL-MCNC: 12 MG/DL (ref 8–23)
BUN/CREAT SERPL: 17.9 (ref 7–25)
CALCIUM SPEC-SCNC: 9.7 MG/DL (ref 8.6–10.5)
CHLORIDE SERPL-SCNC: 102 MMOL/L (ref 98–107)
CO2 SERPL-SCNC: 25 MMOL/L (ref 22–29)
CREAT SERPL-MCNC: 0.67 MG/DL (ref 0.76–1.27)
DEPRECATED RDW RBC AUTO: 42.3 FL (ref 37–54)
EGFRCR SERPLBLD CKD-EPI 2021: 98.6 ML/MIN/1.73
ERYTHROCYTE [DISTWIDTH] IN BLOOD BY AUTOMATED COUNT: 13.3 % (ref 12.3–15.4)
GLUCOSE SERPL-MCNC: 107 MG/DL (ref 65–99)
HCT VFR BLD AUTO: 41 % (ref 37.5–51)
HGB BLD-MCNC: 13.9 G/DL (ref 13–17.7)
INR PPP: 1.11 (ref 0.84–1.13)
MCH RBC QN AUTO: 29.4 PG (ref 26.6–33)
MCHC RBC AUTO-ENTMCNC: 33.9 G/DL (ref 31.5–35.7)
MCV RBC AUTO: 86.9 FL (ref 79–97)
PLATELET # BLD AUTO: 239 10*3/MM3 (ref 140–450)
PMV BLD AUTO: 9.2 FL (ref 6–12)
POTASSIUM SERPL-SCNC: 4.3 MMOL/L (ref 3.5–5.2)
PROTHROMBIN TIME: 14.3 SECONDS (ref 11.4–14.4)
RBC # BLD AUTO: 4.72 10*6/MM3 (ref 4.14–5.8)
SARS-COV-2 RNA PNL SPEC NAA+PROBE: NOT DETECTED
SODIUM SERPL-SCNC: 137 MMOL/L (ref 136–145)
TSH SERPL DL<=0.05 MIU/L-ACNC: 2.89 UIU/ML (ref 0.27–4.2)
WBC NRBC COR # BLD: 6.19 10*3/MM3 (ref 3.4–10.8)

## 2022-04-25 PROCEDURE — 80048 BASIC METABOLIC PNL TOTAL CA: CPT

## 2022-04-25 PROCEDURE — 85027 COMPLETE CBC AUTOMATED: CPT

## 2022-04-25 PROCEDURE — 36415 COLL VENOUS BLD VENIPUNCTURE: CPT

## 2022-04-25 PROCEDURE — C9803 HOPD COVID-19 SPEC COLLECT: HCPCS

## 2022-04-25 PROCEDURE — 84443 ASSAY THYROID STIM HORMONE: CPT

## 2022-04-25 PROCEDURE — U0004 COV-19 TEST NON-CDC HGH THRU: HCPCS

## 2022-04-25 PROCEDURE — U0005 INFEC AGEN DETEC AMPLI PROBE: HCPCS

## 2022-04-25 PROCEDURE — 0 IOPAMIDOL PER 1 ML: Performed by: INTERNAL MEDICINE

## 2022-04-25 PROCEDURE — 71275 CT ANGIOGRAPHY CHEST: CPT

## 2022-04-25 PROCEDURE — 85610 PROTHROMBIN TIME: CPT

## 2022-04-25 RX ADMIN — IOPAMIDOL 80 ML: 755 INJECTION, SOLUTION INTRAVENOUS at 16:52

## 2022-04-25 NOTE — PAT
"Office was contacted re: NPO order.  Message was left with Ella to contact patient if they would like to change to hydration protocol.  Dear Patient,    Do NOT eat, drink, or smoke after midnight the night before your procedure.   Take your medications as instructed by your doctor.    Glasses and jewelry may be worn, but dentures must be removed prior to your procedure.    Leave any items you consider valuable at home.      MORNING of your Procedure, please bring the following:     -Photo ID and insurance card(s)    -ALL medications in their ORIGINAL CONTAINERS    -Co-pay and/or deductible required by your insurance   -Copy of living will or power of  document (if not brought to    Pre-Admission Testing department)   -CPAP mask and tubing, not your machine (if applicable)    -Relaxation aids (music, books, magazines)   -Skin Prep Instruction Sheet (if applicable)   -Relaxation Aids    Check in on the 2nd floor in the [facility].  Your procedure will be performed in the cath lab or EP lab.  During your procedure, your family will wait in the cath lab waiting area where you checked in.      Need to make arrangements for transportation prior to discharge.    A handout regarding \"Heart Healthy Eating\" was provided today to encourage healthy eating habits.    Booklet published by [employer]ergio was given in Pre-Admission testing.  This booklet is for informational purposes only.  If you have any questions about your procedure, please speak with your physician.      Please note:  If you are scheduled to have one of the following procedures: Pulmonary Vein Ablation, Lead Extraction, MitraClip, Cerebral Coilings or Embolization, please let your family know that after your procedure you will be going to recovery unit on the 2nd floor of the [facility] building.  When the physician is finished speaking with your family after your procedure is completed, your family will be directed or escorted to the surgery waiting area in the " [address] [place] [address].  This is where your family will wait until you are given a room assignment and then your family will be directed to the appropriate unit.

## 2022-04-25 NOTE — DISCHARGE INSTRUCTIONS
"Dear Patient,    Do NOT eat, drink, or smoke after midnight the night before your procedure.   Take your medications as instructed by your doctor.    Glasses and jewelry may be worn, but dentures must be removed prior to your procedure.    Leave any items you consider valuable at home.      MORNING of your Procedure, please bring the following:     -Photo ID and insurance card(s)    -ALL medications in their ORIGINAL CONTAINERS    -Co-pay and/or deductible required by your insurance   -Copy of living will or power of  document (if not brought to    Pre-Admission Testing department)   -CPAP mask and tubing, not your machine (if applicable)    -Relaxation aids (music, books, magazines)   -Skin Prep Instruction Sheet (if applicable)   -Relaxation Aids    [facility] in [facility]nd [facility]  Your procedure will be performed in the cath lab or EP lab.  During your procedure, your family will wait in the cath lab waiting area where you checked in.      Need to make arrangements for transportation prior to discharge.    A handout regarding \"Heart Healthy Eating\" was provided today to encourage healthy eating habits.    Booklet published by [employer] was given in Pre-Admission testing.  This booklet is for informational purposes only.  If you have any questions about your procedure, please speak with your physician.      Please note:  If you are scheduled to have one of the following procedures: Pulmonary Vein Ablation, Lead Extraction, MitraClip, Cerebral Coilings or Embolization, please let your family know that after your procedure you will be going to recovery unit on the 2nd floor [facility].  When the physician is finished speaking with your family after your procedure is completed, your family will be directed or escorted to the surgery waiting area in the [facility].  This is where your family will wait until you are given a room assignment and then your family will be directed to the " appropriate unit.

## 2022-04-27 ENCOUNTER — ANESTHESIA (OUTPATIENT)
Dept: CARDIOLOGY | Facility: HOSPITAL | Age: 72
End: 2022-04-27

## 2022-04-27 ENCOUNTER — HOSPITAL ENCOUNTER (OUTPATIENT)
Facility: HOSPITAL | Age: 72
Setting detail: HOSPITAL OUTPATIENT SURGERY
Discharge: HOME | End: 2022-04-27
Attending: STUDENT IN AN ORGANIZED HEALTH CARE EDUCATION/TRAINING PROGRAM | Admitting: INTERNAL MEDICINE

## 2022-04-27 ENCOUNTER — ANESTHESIA EVENT CONVERTED (OUTPATIENT)
Dept: ANESTHESIOLOGY | Facility: HOSPITAL | Age: 72
End: 2022-04-27

## 2022-04-27 ENCOUNTER — ANESTHESIA EVENT (OUTPATIENT)
Dept: CARDIOLOGY | Facility: HOSPITAL | Age: 72
End: 2022-04-27

## 2022-04-27 VITALS
RESPIRATION RATE: 16 BRPM | TEMPERATURE: 97.1 F | OXYGEN SATURATION: 96 % | SYSTOLIC BLOOD PRESSURE: 137 MMHG | WEIGHT: 233.47 LBS | HEART RATE: 77 BPM | HEIGHT: 70 IN | BODY MASS INDEX: 33.42 KG/M2 | DIASTOLIC BLOOD PRESSURE: 90 MMHG

## 2022-04-27 DIAGNOSIS — I48.19 PERSISTENT ATRIAL FIBRILLATION: ICD-10-CM

## 2022-04-27 LAB
ACT BLD: 297 SECONDS (ref 82–152)
ACT BLD: 315 SECONDS (ref 82–152)
ACT BLD: 321 SECONDS (ref 82–152)
ACT BLD: 327 SECONDS (ref 82–152)
ACT BLD: 327 SECONDS (ref 82–152)
ACT BLD: 339 SECONDS (ref 82–152)
ACT BLD: 351 SECONDS (ref 82–152)
GLUCOSE BLDC GLUCOMTR-MCNC: 117 MG/DL (ref 70–130)
HBA1C MFR BLD: 5.4 % (ref 4.8–5.6)

## 2022-04-27 PROCEDURE — 93657 TX L/R ATRIAL FIB ADDL: CPT | Performed by: STUDENT IN AN ORGANIZED HEALTH CARE EDUCATION/TRAINING PROGRAM

## 2022-04-27 PROCEDURE — 25010000002 ADENOSINE PER 6 MG: Performed by: STUDENT IN AN ORGANIZED HEALTH CARE EDUCATION/TRAINING PROGRAM

## 2022-04-27 PROCEDURE — C1766 INTRO/SHEATH,STRBLE,NON-PEEL: HCPCS | Performed by: STUDENT IN AN ORGANIZED HEALTH CARE EDUCATION/TRAINING PROGRAM

## 2022-04-27 PROCEDURE — C1894 INTRO/SHEATH, NON-LASER: HCPCS | Performed by: STUDENT IN AN ORGANIZED HEALTH CARE EDUCATION/TRAINING PROGRAM

## 2022-04-27 PROCEDURE — C1760 CLOSURE DEV, VASC: HCPCS | Performed by: STUDENT IN AN ORGANIZED HEALTH CARE EDUCATION/TRAINING PROGRAM

## 2022-04-27 PROCEDURE — C1732 CATH, EP, DIAG/ABL, 3D/VECT: HCPCS | Performed by: STUDENT IN AN ORGANIZED HEALTH CARE EDUCATION/TRAINING PROGRAM

## 2022-04-27 PROCEDURE — 93622 COMP EP EVAL L VENTR PAC&REC: CPT | Performed by: STUDENT IN AN ORGANIZED HEALTH CARE EDUCATION/TRAINING PROGRAM

## 2022-04-27 PROCEDURE — 85347 COAGULATION TIME ACTIVATED: CPT

## 2022-04-27 PROCEDURE — C1730 CATH, EP, 19 OR FEW ELECT: HCPCS | Performed by: STUDENT IN AN ORGANIZED HEALTH CARE EDUCATION/TRAINING PROGRAM

## 2022-04-27 PROCEDURE — 93655 ICAR CATH ABLTJ DSCRT ARRHYT: CPT | Performed by: STUDENT IN AN ORGANIZED HEALTH CARE EDUCATION/TRAINING PROGRAM

## 2022-04-27 PROCEDURE — 25010000002 PROPOFOL 10 MG/ML EMULSION: Performed by: NURSE ANESTHETIST, CERTIFIED REGISTERED

## 2022-04-27 PROCEDURE — 93623 PRGRMD STIMJ&PACG IV RX NFS: CPT | Performed by: STUDENT IN AN ORGANIZED HEALTH CARE EDUCATION/TRAINING PROGRAM

## 2022-04-27 PROCEDURE — C1759 CATH, INTRA ECHOCARDIOGRAPHY: HCPCS | Performed by: STUDENT IN AN ORGANIZED HEALTH CARE EDUCATION/TRAINING PROGRAM

## 2022-04-27 PROCEDURE — 83036 HEMOGLOBIN GLYCOSYLATED A1C: CPT | Performed by: NURSE PRACTITIONER

## 2022-04-27 PROCEDURE — 82962 GLUCOSE BLOOD TEST: CPT

## 2022-04-27 PROCEDURE — 25010000002 ONDANSETRON PER 1 MG: Performed by: NURSE ANESTHETIST, CERTIFIED REGISTERED

## 2022-04-27 PROCEDURE — 93656 COMPRE EP EVAL ABLTJ ATR FIB: CPT | Performed by: STUDENT IN AN ORGANIZED HEALTH CARE EDUCATION/TRAINING PROGRAM

## 2022-04-27 PROCEDURE — 25010000002 HEPARIN (PORCINE) PER 1000 UNITS: Performed by: STUDENT IN AN ORGANIZED HEALTH CARE EDUCATION/TRAINING PROGRAM

## 2022-04-27 PROCEDURE — C1893 INTRO/SHEATH, FIXED,NON-PEEL: HCPCS | Performed by: STUDENT IN AN ORGANIZED HEALTH CARE EDUCATION/TRAINING PROGRAM

## 2022-04-27 PROCEDURE — 25010000002 DEXAMETHASONE PER 1 MG: Performed by: NURSE ANESTHETIST, CERTIFIED REGISTERED

## 2022-04-27 PROCEDURE — 25010000002 FENTANYL CITRATE (PF) 50 MCG/ML SOLUTION: Performed by: NURSE ANESTHETIST, CERTIFIED REGISTERED

## 2022-04-27 PROCEDURE — 25010000002 PROTAMINE SULFATE PER 10 MG: Performed by: STUDENT IN AN ORGANIZED HEALTH CARE EDUCATION/TRAINING PROGRAM

## 2022-04-27 PROCEDURE — 25010000002 NEOSTIGMINE 10 MG/10ML SOLUTION: Performed by: NURSE ANESTHETIST, CERTIFIED REGISTERED

## 2022-04-27 RX ORDER — ACETAMINOPHEN 325 MG/1
650 TABLET ORAL EVERY 4 HOURS PRN
Status: DISCONTINUED | OUTPATIENT
Start: 2022-04-27 | End: 2022-04-27 | Stop reason: HOSPADM

## 2022-04-27 RX ORDER — ONDANSETRON 2 MG/ML
INJECTION INTRAMUSCULAR; INTRAVENOUS AS NEEDED
Status: DISCONTINUED | OUTPATIENT
Start: 2022-04-27 | End: 2022-04-27 | Stop reason: SURG

## 2022-04-27 RX ORDER — SODIUM CHLORIDE 0.9 % (FLUSH) 0.9 %
3 SYRINGE (ML) INJECTION EVERY 12 HOURS SCHEDULED
Status: CANCELLED | OUTPATIENT
Start: 2022-04-27

## 2022-04-27 RX ORDER — HEPARIN SODIUM 1000 [USP'U]/ML
INJECTION, SOLUTION INTRAVENOUS; SUBCUTANEOUS AS NEEDED
Status: DISCONTINUED | OUTPATIENT
Start: 2022-04-27 | End: 2022-04-27 | Stop reason: HOSPADM

## 2022-04-27 RX ORDER — HYDROMORPHONE HYDROCHLORIDE 1 MG/ML
0.5 INJECTION, SOLUTION INTRAMUSCULAR; INTRAVENOUS; SUBCUTANEOUS
Status: DISCONTINUED | OUTPATIENT
Start: 2022-04-27 | End: 2022-04-27 | Stop reason: HOSPADM

## 2022-04-27 RX ORDER — PROTAMINE SULFATE 10 MG/ML
INJECTION, SOLUTION INTRAVENOUS AS NEEDED
Status: DISCONTINUED | OUTPATIENT
Start: 2022-04-27 | End: 2022-04-27 | Stop reason: HOSPADM

## 2022-04-27 RX ORDER — GLYCOPYRROLATE 0.2 MG/ML
INJECTION INTRAMUSCULAR; INTRAVENOUS AS NEEDED
Status: DISCONTINUED | OUTPATIENT
Start: 2022-04-27 | End: 2022-04-27 | Stop reason: SURG

## 2022-04-27 RX ORDER — PROPOFOL 10 MG/ML
VIAL (ML) INTRAVENOUS AS NEEDED
Status: DISCONTINUED | OUTPATIENT
Start: 2022-04-27 | End: 2022-04-27 | Stop reason: SURG

## 2022-04-27 RX ORDER — SUCRALFATE ORAL 1 G/10ML
1 SUSPENSION ORAL 3 TIMES DAILY
Qty: 250 ML | Refills: 0 | Status: SHIPPED | OUTPATIENT
Start: 2022-04-27 | End: 2022-06-20

## 2022-04-27 RX ORDER — ROCURONIUM BROMIDE 10 MG/ML
INJECTION, SOLUTION INTRAVENOUS AS NEEDED
Status: DISCONTINUED | OUTPATIENT
Start: 2022-04-27 | End: 2022-04-27 | Stop reason: SURG

## 2022-04-27 RX ORDER — ACETAMINOPHEN 325 MG/1
650 TABLET ORAL EVERY 4 HOURS PRN
Status: CANCELLED | OUTPATIENT
Start: 2022-04-27

## 2022-04-27 RX ORDER — SODIUM CHLORIDE 9 MG/ML
INJECTION, SOLUTION INTRAVENOUS CONTINUOUS PRN
Status: COMPLETED | OUTPATIENT
Start: 2022-04-27 | End: 2022-04-27

## 2022-04-27 RX ORDER — FENTANYL CITRATE 50 UG/ML
INJECTION, SOLUTION INTRAMUSCULAR; INTRAVENOUS AS NEEDED
Status: DISCONTINUED | OUTPATIENT
Start: 2022-04-27 | End: 2022-04-27 | Stop reason: SURG

## 2022-04-27 RX ORDER — FENTANYL CITRATE 50 UG/ML
50 INJECTION, SOLUTION INTRAMUSCULAR; INTRAVENOUS
Status: DISCONTINUED | OUTPATIENT
Start: 2022-04-27 | End: 2022-04-27 | Stop reason: HOSPADM

## 2022-04-27 RX ORDER — SODIUM CHLORIDE 0.9 % (FLUSH) 0.9 %
10 SYRINGE (ML) INJECTION AS NEEDED
Status: CANCELLED | OUTPATIENT
Start: 2022-04-27

## 2022-04-27 RX ORDER — BUPIVACAINE HCL/0.9 % NACL/PF 0.125 %
PLASTIC BAG, INJECTION (ML) EPIDURAL AS NEEDED
Status: DISCONTINUED | OUTPATIENT
Start: 2022-04-27 | End: 2022-04-27 | Stop reason: SURG

## 2022-04-27 RX ORDER — ADENOSINE 3 MG/ML
INJECTION, SOLUTION INTRAVENOUS AS NEEDED
Status: DISCONTINUED | OUTPATIENT
Start: 2022-04-27 | End: 2022-04-27 | Stop reason: HOSPADM

## 2022-04-27 RX ORDER — EPHEDRINE SULFATE 50 MG/ML
INJECTION, SOLUTION INTRAVENOUS AS NEEDED
Status: DISCONTINUED | OUTPATIENT
Start: 2022-04-27 | End: 2022-04-27 | Stop reason: SURG

## 2022-04-27 RX ORDER — NEOSTIGMINE METHYLSULFATE 1 MG/ML
INJECTION, SOLUTION INTRAVENOUS AS NEEDED
Status: DISCONTINUED | OUTPATIENT
Start: 2022-04-27 | End: 2022-04-27 | Stop reason: SURG

## 2022-04-27 RX ORDER — HEPARIN SODIUM 10000 [USP'U]/100ML
INJECTION, SOLUTION INTRAVENOUS CONTINUOUS PRN
Status: COMPLETED | OUTPATIENT
Start: 2022-04-27 | End: 2022-04-27

## 2022-04-27 RX ORDER — LIDOCAINE HYDROCHLORIDE 10 MG/ML
INJECTION, SOLUTION EPIDURAL; INFILTRATION; INTRACAUDAL; PERINEURAL AS NEEDED
Status: DISCONTINUED | OUTPATIENT
Start: 2022-04-27 | End: 2022-04-27 | Stop reason: SURG

## 2022-04-27 RX ORDER — SODIUM CHLORIDE 0.9 % (FLUSH) 0.9 %
10 SYRINGE (ML) INJECTION AS NEEDED
Status: DISCONTINUED | OUTPATIENT
Start: 2022-04-27 | End: 2022-04-27 | Stop reason: HOSPADM

## 2022-04-27 RX ORDER — ACETAMINOPHEN 650 MG/1
650 SUPPOSITORY RECTAL EVERY 4 HOURS PRN
Status: CANCELLED | OUTPATIENT
Start: 2022-04-27

## 2022-04-27 RX ORDER — IBUPROFEN 400 MG/1
400 TABLET ORAL EVERY 6 HOURS PRN
Status: CANCELLED | OUTPATIENT
Start: 2022-04-27

## 2022-04-27 RX ORDER — SODIUM CHLORIDE 0.9 % (FLUSH) 0.9 %
3 SYRINGE (ML) INJECTION EVERY 12 HOURS SCHEDULED
Status: DISCONTINUED | OUTPATIENT
Start: 2022-04-27 | End: 2022-04-27 | Stop reason: HOSPADM

## 2022-04-27 RX ORDER — DEXAMETHASONE SODIUM PHOSPHATE 4 MG/ML
INJECTION, SOLUTION INTRA-ARTICULAR; INTRALESIONAL; INTRAMUSCULAR; INTRAVENOUS; SOFT TISSUE AS NEEDED
Status: DISCONTINUED | OUTPATIENT
Start: 2022-04-27 | End: 2022-04-27 | Stop reason: SURG

## 2022-04-27 RX ADMIN — PROPOFOL 200 MG: 10 INJECTION, EMULSION INTRAVENOUS at 08:44

## 2022-04-27 RX ADMIN — GLYCOPYRROLATE 0.4 MG: 0.2 INJECTION INTRAMUSCULAR; INTRAVENOUS at 12:57

## 2022-04-27 RX ADMIN — Medication 100 MCG: at 10:39

## 2022-04-27 RX ADMIN — FENTANYL CITRATE 100 MCG: 50 INJECTION, SOLUTION INTRAMUSCULAR; INTRAVENOUS at 08:44

## 2022-04-27 RX ADMIN — ROCURONIUM BROMIDE 20 MG: 10 INJECTION INTRAVENOUS at 10:07

## 2022-04-27 RX ADMIN — NEOSTIGMINE METHYLSULFATE 3 MG: 0.5 INJECTION INTRAVENOUS at 12:57

## 2022-04-27 RX ADMIN — LIDOCAINE HYDROCHLORIDE 50 MG: 10 INJECTION, SOLUTION EPIDURAL; INFILTRATION; INTRACAUDAL; PERINEURAL at 08:44

## 2022-04-27 RX ADMIN — Medication 200 MCG: at 08:47

## 2022-04-27 RX ADMIN — ONDANSETRON 4 MG: 2 INJECTION INTRAMUSCULAR; INTRAVENOUS at 12:57

## 2022-04-27 RX ADMIN — Medication 200 MCG: at 09:00

## 2022-04-27 RX ADMIN — DEXAMETHASONE SODIUM PHOSPHATE 8 MG: 4 INJECTION INTRA-ARTICULAR; INTRALESIONAL; INTRAMUSCULAR; INTRAVENOUS; SOFT TISSUE at 08:48

## 2022-04-27 RX ADMIN — ROCURONIUM BROMIDE 50 MG: 10 INJECTION INTRAVENOUS at 08:44

## 2022-04-27 RX ADMIN — EPHEDRINE SULFATE 10 MG: 50 INJECTION, SOLUTION INTRAVENOUS at 09:00

## 2022-04-27 RX ADMIN — ROCURONIUM BROMIDE 20 MG: 10 INJECTION INTRAVENOUS at 11:03

## 2022-04-27 NOTE — ANESTHESIA PROCEDURE NOTES
Airway  Urgency: elective    Date/Time: 4/27/2022 8:45 AM  Airway not difficult    General Information and Staff    Patient location during procedure: OR  CRNA/CAA: Manuel Lawler CRNA    Indications and Patient Condition  Indications for airway management: airway protection    Preoxygenated: yes  MILS not maintained throughout  Mask difficulty assessment: 1 - vent by mask    Final Airway Details  Final airway type: endotracheal airway      Successful airway: ETT  Cuffed: yes   Successful intubation technique: direct laryngoscopy  Facilitating devices/methods: intubating stylet  Endotracheal tube insertion site: oral  Blade: Jeff  Blade size: 3  ETT size (mm): 7.5  Cormack-Lehane Classification: grade I - full view of glottis  Placement verified by: chest auscultation and capnometry   Measured from: lips  ETT/EBT  to lips (cm): 20  Number of attempts at approach: 1  Assessment: lips, teeth, and gum same as pre-op and atraumatic intubation    Additional Comments  Negative epigastric sounds, Breath sound equal bilaterally with symmetric chest rise and fall

## 2022-04-27 NOTE — ANESTHESIA PREPROCEDURE EVALUATION
Anesthesia Evaluation                  Airway   Mallampati: I  TM distance: >3 FB  Neck ROM: full  No difficulty expected  Dental      Pulmonary    (+) sleep apnea,   Cardiovascular     ECG reviewed    (+) hypertension, dysrhythmias Atrial Fib,       Neuro/Psych  GI/Hepatic/Renal/Endo    (+) obesity,  GERD,      Musculoskeletal     Abdominal    Substance History      OB/GYN          Other                        Anesthesia Plan    ASA 3     general     intravenous induction     Anesthetic plan, all risks, benefits, and alternatives have been provided, discussed and informed consent has been obtained with: patient.    Plan discussed with CRNA.        CODE STATUS:

## 2022-04-27 NOTE — ANESTHESIA POSTPROCEDURE EVALUATION
"Patient: Erik Arcos    Procedure Summary     Date: 04/27/22 Room / Location: [facility] / [facility]    Anesthesia Start: 0833 Anesthesia Stop:     Procedure: PVA (persistent), hold Tikosyn x 5 days (N/A ) Diagnosis:       Persistent atrial fibrillation (HCC)      (AF)    Providers: William Hargrove MD Provider: Ivan Segal MD    Anesthesia Type: general ASA Status: 3          Anesthesia Type: general    Vitals  No vitals data found for the desired time range.          Post Anesthesia Care and Evaluation    Patient location during evaluation: PACU  Patient participation: complete - patient participated  Level of consciousness: awake and responsive to verbal stimuli  Pain score: 2  Pain management: adequate  Airway patency: patent  Anesthetic complications: No anesthetic complications    Cardiovascular status: acceptable  Respiratory status: acceptable  Hydration status: acceptable    Comments: Pt awake and responsive. SV. VSS. Report to RN. Patient Vitals in the past 24 hrs:  04/27/22 0645, BP:(!) 151/108  04/27/22 0643, BP:(!) 152/114, Temp:97.4 °F (36.3 °C), Temp src:Tympanic, Pulse:118, Resp:16, SpO2:98 %, Height:177.8 cm (70\"), Weight:106 kg (233 lb 7.5 oz)  133/78. p 72. r 16. t 98.1                  "

## 2022-04-28 ENCOUNTER — CALL CENTER PROGRAMS (OUTPATIENT)
Dept: CALL CENTER | Facility: HOSPITAL | Age: 72
End: 2022-04-28

## 2022-04-28 NOTE — OUTREACH NOTE
PCI/Device Survey    Flowsheet Row Responses   Facility patient discharged from? [facility]   Procedure date 04/27/22   Procedure (if device, specify in description) PCI   PCI site Right, Left, Groin   Performing MD Other (annotate)  [Dr. Hargrove]   Attempt successful? Yes   Call start time 1230   Call end time 1240   Has the patient had any of the following symptoms since discharge? Chest pain, Dizziness or lightheadedness, Shortness of breath, Leg or arm pain   Nursing interventions Advised to call 911, Advised to call MD   Is the patient taking prescribed medications: --  [Eliquis]   Nursing intervention Reminded to continue to take prescribed medications   Does the patient have any of the following symptoms related to the cath/surgical site? Bruising, Unusal pain at the site, Redness, warmth, or swelling at the site, Drainage (other than a small amount of blood on the dressing), Bleeding that does not stop after 30 minutes of holding steady pressure on the site, Fever, Arm, hand or leg pale, cool, tingly or numb   Nursing intervention Patient education provided   Does the patient have an appointment scheduled with the cardiologist? Yes   Appointment comments Cards will call appt for 3 months.   If the patient is a current smoker, are they able to teach back resources for cessation? Not a smoker   Did the patient feel prepared to go home on the same day as the procedure? Yes   Is the patient satisfied with the same day discharge process? Yes   PCI/Device call completed Yes   Wrap up additional comments He is doing well he says, will keep his appts          LLOYD EARLY - Registered Nurse

## 2022-05-16 ENCOUNTER — OFFICE VISIT (OUTPATIENT)
Dept: SLEEP MEDICINE | Facility: CLINIC | Age: 72
End: 2022-05-16

## 2022-05-16 VITALS
SYSTOLIC BLOOD PRESSURE: 164 MMHG | HEART RATE: 80 BPM | DIASTOLIC BLOOD PRESSURE: 88 MMHG | WEIGHT: 243.2 LBS | BODY MASS INDEX: 34.82 KG/M2 | OXYGEN SATURATION: 97 % | HEIGHT: 70 IN

## 2022-05-16 DIAGNOSIS — G47.33 OBSTRUCTIVE SLEEP APNEA, ADULT: Primary | ICD-10-CM

## 2022-05-16 DIAGNOSIS — E66.09 CLASS 1 OBESITY DUE TO EXCESS CALORIES WITHOUT SERIOUS COMORBIDITY WITH BODY MASS INDEX (BMI) OF 33.0 TO 33.9 IN ADULT: ICD-10-CM

## 2022-05-16 DIAGNOSIS — I48.91 NEW ONSET ATRIAL FIBRILLATION: ICD-10-CM

## 2022-05-16 PROCEDURE — 99213 OFFICE O/P EST LOW 20 MIN: CPT | Performed by: INTERNAL MEDICINE

## 2022-05-16 RX ORDER — ATORVASTATIN CALCIUM 20 MG/1
TABLET, FILM COATED ORAL
Qty: 90 TABLET | Refills: 3 | Status: SHIPPED | OUTPATIENT
Start: 2022-05-16

## 2022-05-17 RX ORDER — APIXABAN 5 MG/1
TABLET, FILM COATED ORAL
Qty: 120 TABLET | Refills: 3 | Status: SHIPPED | OUTPATIENT
Start: 2022-05-17 | End: 2022-05-31 | Stop reason: SDUPTHER

## 2022-05-19 RX ORDER — METOPROLOL TARTRATE 50 MG/1
TABLET, FILM COATED ORAL
Qty: 60 TABLET | Refills: 0 | OUTPATIENT
Start: 2022-05-19

## 2022-05-31 ENCOUNTER — HOSPITAL ENCOUNTER (OUTPATIENT)
Dept: CARDIOLOGY | Facility: HOSPITAL | Age: 72
Discharge: HOME | End: 2022-05-31

## 2022-05-31 ENCOUNTER — OFFICE VISIT (OUTPATIENT)
Dept: CARDIOLOGY | Facility: HOSPITAL | Age: 72
End: 2022-05-31

## 2022-05-31 VITALS
RESPIRATION RATE: 19 BRPM | WEIGHT: 235 LBS | HEART RATE: 78 BPM | DIASTOLIC BLOOD PRESSURE: 68 MMHG | OXYGEN SATURATION: 98 % | SYSTOLIC BLOOD PRESSURE: 128 MMHG | TEMPERATURE: 97.6 F | BODY MASS INDEX: 33.64 KG/M2 | HEIGHT: 70 IN

## 2022-05-31 DIAGNOSIS — I48.19 PERSISTENT ATRIAL FIBRILLATION: ICD-10-CM

## 2022-05-31 DIAGNOSIS — G47.33 OSA (OBSTRUCTIVE SLEEP APNEA): ICD-10-CM

## 2022-05-31 DIAGNOSIS — Z98.890 HISTORY OF CARDIAC RADIOFREQUENCY ABLATION (RFA): ICD-10-CM

## 2022-05-31 DIAGNOSIS — I10 ESSENTIAL HYPERTENSION: ICD-10-CM

## 2022-05-31 LAB
QT INTERVAL: 406 MS
QTC INTERVAL: 456 MS

## 2022-05-31 PROCEDURE — 99214 OFFICE O/P EST MOD 30 MIN: CPT | Performed by: NURSE PRACTITIONER

## 2022-05-31 PROCEDURE — 93005 ELECTROCARDIOGRAM TRACING: CPT | Performed by: NURSE PRACTITIONER

## 2022-05-31 PROCEDURE — 93010 ELECTROCARDIOGRAM REPORT: CPT | Performed by: INTERNAL MEDICINE

## 2022-05-31 NOTE — PROGRESS NOTES
"[facility], [facility]    Chief Complaint  Follow-up and Atrial Fibrillation (pva)    Subjective    History of Present Illness {  Problem List  Visit  Diagnosis   Encounters  Notes  Medications  Labs  Result Review Imaging  Media :23}     Erik Arcos presents to [facility] CARDIOLOG[facility] for   History of Present Illness     71-year-old male with history of persistent atrial fibrillation, hypertension, hyperlipidemia, GERD, LAUREL.  Patient with recurrent atrial fibrillation despite sotalol use.  Presented for pulmonary vein ablation on 4/27/2022.    No known afib recurrence.  Rare palpitations, short in duration.  No chest pain, pressure, dyspnea, dizziness, near syncope, syncope, edema.  No dysphagia, dyspepsia, abdominal pain, nausea, vomiting, hematuria, dysuria, worsening bruising.    Back to normal physical activity without concerns.  Reports improved symptoms after ablation.    Patient has been compliant with CPAP with better quality sleep less fatigue.      Objective     Vital Signs:   Vitals:    05/31/22 0902   BP: 128/68   BP Location: Left arm   Patient Position: Sitting   Cuff Size: Adult   Pulse: 78   Resp: 19   Temp: 97.6 °F (36.4 °C)   TempSrc: Temporal   SpO2: 98%   Weight: 107 kg (235 lb)   Height: 177.8 cm (70\")     Body mass index is 33.72 kg/m².  Physical Exam  Vitals reviewed.   Constitutional:       General: He is not in acute distress.     Appearance: Normal appearance.   Cardiovascular:      Rate and Rhythm: Normal rate and regular rhythm.      Pulses:           Radial pulses are 2+ on the right side.        Dorsalis pedis pulses are 2+ on the right side.        Posterior tibial pulses are 2+ on the right side.      Heart sounds: Normal heart sounds.   Pulmonary:      Effort: Pulmonary effort is normal.      Breath sounds: Normal breath sounds.   Musculoskeletal:      Right lower leg: No edema.      Left lower leg: No " edema.   Skin:     General: Skin is warm and dry.   Neurological:      Mental Status: He is alert.   Psychiatric:         Mood and Affect: Mood normal.         Behavior: Behavior is cooperative.              Result Review  Data Reviewed:{ Labs  Result Review  Imaging  Med Tab  Media :23}     Lab Results   Component Value Date    WBC 6.19 04/25/2022    HGB 13.9 04/25/2022    HCT 41.0 04/25/2022    MCV 86.9 04/25/2022     04/25/2022     Lab Results   Component Value Date    GLUCOSE 107 (H) 04/25/2022    CALCIUM 9.7 04/25/2022     04/25/2022    K 4.3 04/25/2022    CO2 25.0 04/25/2022     04/25/2022    BUN 12 04/25/2022    CREATININE 0.67 (L) 04/25/2022    EGFRIFAFRI 117 06/17/2021    EGFRIFNONA 96 06/17/2021    BCR 17.9 04/25/2022    ANIONGAP 10.0 04/25/2022     Lab Results   Component Value Date    TSH 2.890 04/25/2022     Lab Results   Component Value Date    CHLPL 181 06/17/2021    CHLPL 203 (H) 06/16/2020    CHLPL 184 05/21/2019     Lab Results   Component Value Date    TRIG 82 06/17/2021    TRIG 79 06/16/2020    TRIG 89 05/21/2019     Lab Results   Component Value Date    HDL 77 (H) 06/17/2021    HDL 75 (H) 06/16/2020    HDL 60 05/21/2019     Lab Results   Component Value Date    LDL 89 06/17/2021     (H) 06/16/2020     (H) 05/21/2019                   Assessment and Plan {CC Problem List  Visit Diagnosis  ROS  Review (Popup)  Health Maintenance  Quality  BestPractice  Medications  SmartSets  SnapShot Encounters  Media :23}   1. Persistent atrial fibrillation (HCC)    - ECG 12 Lead; sinus rhythm 76 bpm    Refill Eliquis    2. Essential hypertension  Continue amlodipine and lisinopril    3. LAUREL (obstructive sleep apnea)  CPAP compliant    4. History of cardiac radiofrequency ablation (RFA)  Discussed postprocedural expectations and management.  Patient will follow up with [facility] as scheduled.  Follow-up in the heart valve center as needed.  - ECG 12 Lead;  Future            Follow Up {Instructions Charge Capture  Follow-up Communications :23}   Return if symptoms worsen or fail to improve.    Patient was given instructions and counseling regarding his condition or for health maintenance advice. Please see specific information pulled into the AVS if appropriate.  Patient was instructed to call the Heart and Valve Center with any questions, concerns, or worsening symptoms.

## 2022-06-04 RX ORDER — SILDENAFIL 100 MG/1
TABLET, FILM COATED ORAL
Qty: 25 TABLET | Refills: 1 | Status: SHIPPED | OUTPATIENT
Start: 2022-06-04 | End: 2023-02-02 | Stop reason: SDUPTHER

## 2022-06-06 NOTE — PROGRESS NOTES
"Chief Complaint  Follow-up snoring and obstructive sleep apnea    Subjective        Erik Arcos presents to [facility] for the evaluation of snoring and obstructive sleep apnea.  His primary care physician Dr. Vermeesch.  He is also seen by Dr. Wen and Janessa MAGALLANES.  He is seen in person in the sleep clinic.  History of Present Illness  Patient was last seen in clinic January 3, 2022.  He has obstructive sleep apnea and had an index of 52.5 on his previous study.  He has a had atrial fibrillation and obesity.  He was placed on CPAP.  He says he has been using his CPAP nightly.  He generally feels better.  He had an ablation for the atrial fibrillation 2 weeks ago.  He says he usually feels rested in the morning.  He denies any real problems with the machine or mask.    Review of Systems   Constitutional: Negative.    HENT: Negative.    Eyes: Negative.    Respiratory: Negative.    Cardiovascular: Negative.    Gastrointestinal: Negative.    Endocrine: Negative.    Genitourinary: Negative.    Musculoskeletal: Negative.    Skin: Negative.    Allergic/Immunologic: Negative.    Neurological: Positive for dizziness.   Hematological: Bruises/bleeds easily.   Psychiatric/Behavioral: Negative    Odonnell score is 9/24  Objective   Vital Signs:  /88 (BP Location: Left arm, Patient Position: Sitting, Cuff Size: Adult)   Pulse 80   Ht 177.8 cm (70\")   Wt 110 kg (243 lb 3.2 oz)   SpO2 97%   BMI 34.90 kg/m²           Physical Exam patient appears to be awake and alert.  He does not appear to be in acute respiratory distress.    He is normocephalic.  Lungs are clear.    Cardiac exam revealed normal S1-S2.    Extremities showed no edema  Result Review :     Download from his machine for the past 30 days shows usage at 96.7% of the time.  He is averaging 7 hours 42 minutes per night.  His AHI is 0.  Noted percentile pressure was 14.3.     Assessment and Plan   Diagnoses and " all orders for this visit:    1. Obstructive sleep apnea, adult (Primary)  -     Detailed AutoPAP Order    2. Class 1 obesity due to excess calories without serious comorbidity with body mass index (BMI) of 33.0 to 33.9 in adult    Patient has severe obstructive sleep apnea but has fairly control of his respiratory events on his current pressure setting.  He needs to make sure he does not have mask leak that may be contributing to some of these events.  We will continue on his current pressures.  We will renew his supplies.  He is encouraged to achieve ideal body weight.  He is declines referral to a dietitian.  He is encouraged to practice lateral position sleep.  We will plan to see him back in 1 year.  He is to contact us earlier if things worsen.       I spent 25 minutes caring for Erik on this date of service. This time includes time spent by me in the following activities:reviewing tests, obtaining and/or reviewing a separately obtained history, performing a medically appropriate examination and/or evaluation , counseling and educating the patient/family/caregiver, ordering medications, tests, or procedures and documenting information in the medical record  Follow Up   We will plan to see him back in 1 year  Patient was given instructions and counseling regarding his condition or for health maintenance advice. Please see specific information pulled into the AVS if appropriate.   Jake Laura MD [facility]  Sleep Medicine  Pulmonary and Critical Care Medicine

## 2022-06-20 ENCOUNTER — OFFICE VISIT (OUTPATIENT)
Dept: INTERNAL MEDICINE | Facility: CLINIC | Age: 72
End: 2022-06-20

## 2022-06-20 VITALS
HEART RATE: 80 BPM | WEIGHT: 238 LBS | SYSTOLIC BLOOD PRESSURE: 130 MMHG | OXYGEN SATURATION: 98 % | BODY MASS INDEX: 34.07 KG/M2 | TEMPERATURE: 97.3 F | HEIGHT: 70 IN | DIASTOLIC BLOOD PRESSURE: 68 MMHG

## 2022-06-20 DIAGNOSIS — I10 PRIMARY HYPERTENSION: ICD-10-CM

## 2022-06-20 DIAGNOSIS — K21.00 GASTROESOPHAGEAL REFLUX DISEASE WITH ESOPHAGITIS, UNSPECIFIED WHETHER HEMORRHAGE: ICD-10-CM

## 2022-06-20 DIAGNOSIS — Z00.00 ENCOUNTER FOR MEDICARE ANNUAL WELLNESS EXAM: ICD-10-CM

## 2022-06-20 DIAGNOSIS — E66.09 CLASS 1 OBESITY DUE TO EXCESS CALORIES WITH SERIOUS COMORBIDITY AND BODY MASS INDEX (BMI) OF 34.0 TO 34.9 IN ADULT: ICD-10-CM

## 2022-06-20 DIAGNOSIS — G47.33 OSA (OBSTRUCTIVE SLEEP APNEA): ICD-10-CM

## 2022-06-20 DIAGNOSIS — R73.03 PREDIABETES: ICD-10-CM

## 2022-06-20 DIAGNOSIS — Z12.5 SCREENING PSA (PROSTATE SPECIFIC ANTIGEN): ICD-10-CM

## 2022-06-20 DIAGNOSIS — E78.5 HYPERLIPIDEMIA, UNSPECIFIED HYPERLIPIDEMIA TYPE: ICD-10-CM

## 2022-06-20 DIAGNOSIS — I48.19 PERSISTENT ATRIAL FIBRILLATION: ICD-10-CM

## 2022-06-20 PROBLEM — E66.811 CLASS 1 OBESITY DUE TO EXCESS CALORIES WITH SERIOUS COMORBIDITY AND BODY MASS INDEX (BMI) OF 32.0 TO 32.9 IN ADULT: Status: RESOLVED | Noted: 2021-06-17 | Resolved: 2022-06-20

## 2022-06-20 PROBLEM — E66.811 CLASS 1 OBESITY DUE TO EXCESS CALORIES WITH SERIOUS COMORBIDITY AND BODY MASS INDEX (BMI) OF 34.0 TO 34.9 IN ADULT: Status: ACTIVE | Noted: 2022-06-20

## 2022-06-20 LAB
POC CREATININE URINE: 200
POC MICROALBUMIN URINE: 10

## 2022-06-20 PROCEDURE — 1159F MED LIST DOCD IN RCRD: CPT | Performed by: INTERNAL MEDICINE

## 2022-06-20 PROCEDURE — 1170F FXNL STATUS ASSESSED: CPT | Performed by: INTERNAL MEDICINE

## 2022-06-20 PROCEDURE — G0439 PPPS, SUBSEQ VISIT: HCPCS | Performed by: INTERNAL MEDICINE

## 2022-06-20 PROCEDURE — 82044 UR ALBUMIN SEMIQUANTITATIVE: CPT | Performed by: INTERNAL MEDICINE

## 2022-06-20 RX ORDER — IPRATROPIUM BROMIDE 21 UG/1
2 SPRAY, METERED NASAL EVERY 12 HOURS
Qty: 1 EACH | Refills: 3 | Status: SHIPPED | OUTPATIENT
Start: 2022-06-20 | End: 2022-10-13

## 2022-06-20 NOTE — PROGRESS NOTES
The ABCs of the Annual Wellness Visit  Subsequent Medicare Wellness Visit    Chief Complaint   Patient presents with   • Medicare Wellness-subsequent      Subjective    History of Present Illness:  Erik Arcos is a 71 y.o. male who presents for a Subsequent Medicare Wellness Visit.  PMH of HTN, HLD, atrial fibrillation, prediabetes, vitamin D deficiency, GERD with York's esophagus, erectile dysfunction and sleep apnea.  All vaccines up-to-date except shingles vaccine and Td.  A1c 5.4 approximately 1 month ago.  TSH normal 1 month ago.  BP and heart rate well controlled today.  Patient denies any CP, SOA, palpitations or edema.  Currently on Eliquis due to underlying atrial fibrillation-patient underwent pulmonary vein ablation in April and has been feeling well since that time.  Compliant with vitamin D 1000 units daily.  His GERD is doing well on current dose of omeprazole.  Compliant with CPAP for sleep apnea.  He takes his lipitor every night. He tries to follow a HH diet.  He walks a lot at work as a  on weekends, but otherwise not much walking at work or home.  When he sleeps he has congestion on whichever side he is sleeping on.      The following portions of the patient's history were reviewed and   updated as appropriate: allergies, current medications, past family history, past medical history, past social history, past surgical history and problem list.    Compared to one year ago, the patient feels his physical   health is the same.    Compared to one year ago, the patient feels his mental   health is the same.    Recent Hospitalizations:  He was not admitted to the hospital during the last year.       Current Medical Providers:  Patient Care Team:  Vermeesch, Marilyn K, MD as PCP - General (Internal Medicine & Pediatrics)  Pastor Chang MD as Consulting Physician (General Surgery)  Vincenzo Wen MD as Consulting Physician (Cardiology)  William Hargrove MD as Consulting  Physician (Cardiology)  Eddie Encarnacion PA as Physician Assistant (Cardiology)  Jake Laura MD as Consulting Physician (Pulmonary Disease)  Alex Manuel MD as Consulting Physician (Ophthalmology)  Shirley Mcguire OD (Optometry)    Outpatient Medications Prior to Visit   Medication Sig Dispense Refill   • amLODIPine (NORVASC) 5 MG tablet Take 1 tablet by mouth Daily. 30 tablet 11   • apixaban (Eliquis) 5 MG tablet tablet Take 1 tablet by mouth 2 (Two) Times a Day. 360 tablet 3   • atorvastatin (LIPITOR) 20 MG tablet TAKE 1 TABLET DAILY 90 tablet 3   • Cholecalciferol (VITAMIN D-3) 1000 UNITS capsule Take 1,000 Units by mouth Daily.     • lisinopril (PRINIVIL,ZESTRIL) 40 MG tablet TAKE 1 TABLET DAILY (Patient taking differently: Take 40 mg by mouth Daily.) 90 tablet 1   • Misc Natural Products (OSTEO BI-FLEX JOINT SHIELD PO) Take 1 tablet by mouth 2 (Two) Times a Day.     • omeprazole (priLOSEC) 20 MG capsule Take 1 capsule by mouth Daily. 90 capsule 1   • sildenafil (VIAGRA) 100 MG tablet TAKE 1 TABLET DAILY AS     NEEDED FOR ERECTILE        DYSFUNCTION 25 tablet 1   • sucralfate (Carafate) 1 GM/10ML suspension Take 10 mL by mouth 3 (Three) Times a Day. 250 mL 0     No facility-administered medications prior to visit.       No opioid medication identified on active medication list. I have reviewed chart for other potential  high risk medication/s and harmful drug interactions in the elderly.          Aspirin is not on active medication list.  Aspirin use is not indicated based on review of current medical condition/s. Risk of harm outweighs potential benefits.  .    Patient Active Problem List   Diagnosis   • York's esophagus   • Esophageal reflux   • Hyperlipidemia   • Primary hypertension   • Male erectile disorder   • Prediabetes   • Vitamin D deficiency   • Encounter for screening colonoscopy   • Screening PSA (prostate specific antigen)   • Encounter for Medicare annual wellness exam   •  "Persistent atrial fibrillation (HCC)   • LAUREL (obstructive sleep apnea)   • Class 1 obesity due to excess calories with serious comorbidity and body mass index (BMI) of 34.0 to 34.9 in adult     Advance Care Planning  Advance Directive is not on file.  ACP discussion was declined by the patient. Patient does not have an advance directive, declines further assistance.    Review of Systems   Constitutional: Negative.    HENT: Positive for congestion. Negative for hearing loss, tinnitus and trouble swallowing.    Eyes: Negative.    Respiratory: Positive for cough.    Cardiovascular: Negative.    Gastrointestinal: Negative.    Endocrine: Negative.    Genitourinary: Negative.    Musculoskeletal: Positive for arthralgias.   Skin: Negative.    Allergic/Immunologic: Positive for environmental allergies.   Neurological: Negative.    Hematological: Bruises/bleeds easily.   Psychiatric/Behavioral: Negative.         Objective    Vitals:    06/20/22 1436   BP: 130/68   Pulse: 80   Temp: 97.3 °F (36.3 °C)   SpO2: 98%   Weight: 108 kg (238 lb)   Height: 177.8 cm (70\")     Estimated body mass index is 34.15 kg/m² as calculated from the following:    Height as of this encounter: 177.8 cm (70\").    Weight as of this encounter: 108 kg (238 lb).    BMI is >= 30 and <35. (Class 1 Obesity). The following options were offered after discussion;: exercise counseling/recommendations and nutrition counseling/recommendations      Does the patient have evidence of cognitive impairment? No    Physical Exam  Vitals and nursing note reviewed.   Constitutional:       General: He is not in acute distress.     Appearance: Normal appearance. He is well-developed. He is obese. He is not ill-appearing.      Comments: Kind and pleasant , appears stated age and in NAD today   HENT:      Head: Normocephalic and atraumatic.      Right Ear: Tympanic membrane, ear canal and external ear normal.      Left Ear: Tympanic membrane, ear canal and external ear " normal.      Nose: No congestion.      Mouth/Throat:      Pharynx: No posterior oropharyngeal erythema.   Eyes:      General:         Right eye: No discharge.         Left eye: No discharge.      Extraocular Movements: Extraocular movements intact.      Conjunctiva/sclera: Conjunctivae normal.      Pupils: Pupils are equal, round, and reactive to light.   Neck:      Thyroid: No thyromegaly.      Vascular: No carotid bruit.      Comments: No thyromegaly or mass  Cardiovascular:      Rate and Rhythm: Normal rate and regular rhythm.      Pulses: Normal pulses.      Heart sounds: Normal heart sounds. No murmur heard.     Comments: Currently in normal sinus rhythm  Pulmonary:      Effort: Pulmonary effort is normal. No respiratory distress.      Breath sounds: Normal breath sounds. No wheezing.   Abdominal:      General: Bowel sounds are normal. There is no distension.      Palpations: Abdomen is soft.      Tenderness: There is no abdominal tenderness.      Comments: Abdominal obesity limits exam   Musculoskeletal:         General: Normal range of motion.      Cervical back: Normal range of motion and neck supple.      Right lower leg: No edema.      Left lower leg: No edema.      Comments: Mild thoracic kyphosis noted   Lymphadenopathy:      Cervical: No cervical adenopathy.   Skin:     General: Skin is warm.      Findings: No rash.   Neurological:      General: No focal deficit present.      Mental Status: He is alert and oriented to person, place, and time. Mental status is at baseline.      Cranial Nerves: No cranial nerve deficit.      Motor: No weakness.      Coordination: Coordination normal.      Gait: Gait normal.   Psychiatric:         Mood and Affect: Mood normal.         Behavior: Behavior normal.         Thought Content: Thought content normal.         Judgment: Judgment normal.       Lab Results   Component Value Date    HGBA1C 5.40 04/27/2022            HEALTH RISK ASSESSMENT    Smoking Status:  Social  History     Tobacco Use   Smoking Status Former Smoker   • Packs/day: 2.50   • Years: 15.00   • Pack years: 37.50   • Types: Cigarettes   • Quit date: 1987   • Years since quittin.2   Smokeless Tobacco Never Used     Alcohol Consumption:  Social History     Substance and Sexual Activity   Alcohol Use No     Fall Risk Screen:    [place] Fall Risk Assessment was completed, and patient is at LOW risk for falls.Assessment completed on:2022    Depression Screening:  PHQ-2/PHQ-9 Depression Screening 2022   Retired PHQ-9 Total Score -   Retired Total Score -   Little Interest or Pleasure in Doing Things 0-->not at all   Feeling Down, Depressed or Hopeless 0-->not at all   PHQ-9: Brief Depression Severity Measure Score 0       Health Habits and Functional and Cognitive Screening:  Functional & Cognitive Status 2022   Do you have difficulty preparing food and eating? No   Do you have difficulty bathing yourself, getting dressed or grooming yourself? No   Do you have difficulty using the toilet? No   Do you have difficulty moving around from place to place? No   Do you have trouble with steps or getting out of a bed or a chair? No   Current Diet Limited Junk Food   Dental Exam Not up to date   Eye Exam Up to date   Exercise (times per week) 0 times per week   Current Exercises Include No Regular Exercise   Current Exercise Activities Include -   Do you need help using the phone?  No   Are you deaf or do you have serious difficulty hearing?  No   Do you need help with transportation? No   Do you need help shopping? No   Do you need help preparing meals?  No   Do you need help with housework?  No   Do you need help with laundry? No   Do you need help taking your medications? No   Do you need help managing money? No   Do you ever drive or ride in a car without wearing a seat belt? No   Have you felt unusual stress, anger or loneliness in the last month? No   Who do you live with? Spouse   If you need help,  do you have trouble finding someone available to you? No   Have you been bothered in the last four weeks by sexual problems? No   Do you have difficulty concentrating, remembering or making decisions? No       Age-appropriate Screening Schedule:  Refer to the list below for future screening recommendations based on patient's age, sex and/or medical conditions. Orders for these recommended tests are listed in the plan section. The patient has been provided with a written plan.    Health Maintenance   Topic Date Due   • ZOSTER VACCINE (1 of 2) Never done   • URINE MICROALBUMIN  11/20/2019   • LIPID PANEL  06/17/2022   • DIABETIC EYE EXAM  08/19/2022   • HEMOGLOBIN A1C  10/27/2022   • DIABETIC FOOT EXAM  06/20/2023   • TDAP/TD VACCINES (2 - Td or Tdap) 12/31/2023   • INFLUENZA VACCINE  Discontinued              Assessment & Plan   CMS Preventative Services Quick Reference  Risk Factors Identified During Encounter  Cardiovascular Disease  Inactivity/Sedentary  Obesity/Overweight   The above risks/problems have been discussed with the patient.  Follow up actions/plans if indicated are seen below in the Assessment/Plan Section.  Pertinent information has been shared with the patient in the After Visit Summary.    Diagnoses and all orders for this visit:    1. Encounter for Medicare annual wellness exam  -     CBC & Differential  -     Comprehensive Metabolic Panel  -     Lipid Panel With / Chol / HDL Ratio  -     PSA Screen    2. Prediabetes  -     POCT microalbumin    3. Primary hypertension  -     CBC & Differential  -     Comprehensive Metabolic Panel    4. Hyperlipidemia, unspecified hyperlipidemia type  -     Comprehensive Metabolic Panel  -     Lipid Panel With / Chol / HDL Ratio    5. Persistent atrial fibrillation (HCC)    6. Class 1 obesity due to excess calories with serious comorbidity and body mass index (BMI) of 34.0 to 34.9 in adult    7. Gastroesophageal reflux disease with esophagitis, unspecified whether  hemorrhage  -     CBC & Differential    8. LAUREL (obstructive sleep apnea)    9. Screening PSA (prostate specific antigen)  -     PSA Screen    Other orders  -     ipratropium (ATROVENT) 0.03 % nasal spray; 2 sprays into the nostril(s) as directed by provider Every 12 (Twelve) Hours.  Dispense: 1 each; Refill: 3    Follow heart healthy/low salt/low-cholesterol diet, avoid excessive amounts of sweets-decrease portion sizes to help with weight maintenance  Avoid processed foods  Monitor blood pressure as discussed  Exercise as tolerated up to 30 minutes 5 days per week  Take all medications as prescribed  Labs as noted, reviewed recent TSH and A1c  Continue Eliquis, currently in normal sinus rhythm today  Patient states he is compliant with atorvastatin every night for hyperlipidemia  Continue daily vitamin D  GERD is well controlled with omeprazole  Continue CPAP for sleep apnea  Provided patient with Atrovent nasal spray, recommend only 1 spray in each nostril 1 hour before bed at night due to complaints of chronic congestion with use of CPAP  All healthcare maintenance up-to-date except shingles vaccine and Tdap, available at local pharmacy    Follow Up:   Return in about 6 months (around 12/20/2022) for Next scheduled follow up.     An After Visit Summary and PPPS were made available to the patient.

## 2022-06-21 LAB
ALBUMIN SERPL-MCNC: 4.4 G/DL (ref 3.7–4.7)
ALBUMIN/GLOB SERPL: 1.9 {RATIO} (ref 1.2–2.2)
ALP SERPL-CCNC: 63 IU/L (ref 44–121)
ALT SERPL-CCNC: 15 IU/L (ref 0–44)
AST SERPL-CCNC: 19 IU/L (ref 0–40)
BASOPHILS # BLD AUTO: 0 X10E3/UL (ref 0–0.2)
BASOPHILS NFR BLD AUTO: 1 %
BILIRUB SERPL-MCNC: 0.5 MG/DL (ref 0–1.2)
BUN SERPL-MCNC: 17 MG/DL (ref 8–27)
BUN/CREAT SERPL: 21 (ref 10–24)
CALCIUM SERPL-MCNC: 9.4 MG/DL (ref 8.6–10.2)
CHLORIDE SERPL-SCNC: 97 MMOL/L (ref 96–106)
CHOLEST SERPL-MCNC: 187 MG/DL (ref 100–199)
CHOLEST/HDLC SERPL: 2.8 RATIO (ref 0–5)
CO2 SERPL-SCNC: 23 MMOL/L (ref 20–29)
CREAT SERPL-MCNC: 0.81 MG/DL (ref 0.76–1.27)
EGFRCR SERPLBLD CKD-EPI 2021: 93 ML/MIN/1.73
EOSINOPHIL # BLD AUTO: 0.1 X10E3/UL (ref 0–0.4)
EOSINOPHIL NFR BLD AUTO: 2 %
ERYTHROCYTE [DISTWIDTH] IN BLOOD BY AUTOMATED COUNT: 12.4 % (ref 11.6–15.4)
GLOBULIN SER CALC-MCNC: 2.3 G/DL (ref 1.5–4.5)
GLUCOSE SERPL-MCNC: 108 MG/DL (ref 65–99)
HCT VFR BLD AUTO: 41.1 % (ref 37.5–51)
HDLC SERPL-MCNC: 67 MG/DL
HGB BLD-MCNC: 13.4 G/DL (ref 13–17.7)
IMM GRANULOCYTES # BLD AUTO: 0 X10E3/UL (ref 0–0.1)
IMM GRANULOCYTES NFR BLD AUTO: 0 %
LDLC SERPL CALC-MCNC: 104 MG/DL (ref 0–99)
LYMPHOCYTES # BLD AUTO: 1.3 X10E3/UL (ref 0.7–3.1)
LYMPHOCYTES NFR BLD AUTO: 21 %
MCH RBC QN AUTO: 28.6 PG (ref 26.6–33)
MCHC RBC AUTO-ENTMCNC: 32.6 G/DL (ref 31.5–35.7)
MCV RBC AUTO: 88 FL (ref 79–97)
MONOCYTES # BLD AUTO: 0.4 X10E3/UL (ref 0.1–0.9)
MONOCYTES NFR BLD AUTO: 7 %
NEUTROPHILS # BLD AUTO: 4 X10E3/UL (ref 1.4–7)
NEUTROPHILS NFR BLD AUTO: 69 %
PLATELET # BLD AUTO: 245 X10E3/UL (ref 150–450)
POTASSIUM SERPL-SCNC: 4.6 MMOL/L (ref 3.5–5.2)
PROT SERPL-MCNC: 6.7 G/DL (ref 6–8.5)
PSA SERPL-MCNC: 4.1 NG/ML (ref 0–4)
RBC # BLD AUTO: 4.69 X10E6/UL (ref 4.14–5.8)
SODIUM SERPL-SCNC: 135 MMOL/L (ref 134–144)
TRIGL SERPL-MCNC: 87 MG/DL (ref 0–149)
VLDLC SERPL CALC-MCNC: 16 MG/DL (ref 5–40)
WBC # BLD AUTO: 5.9 X10E3/UL (ref 3.4–10.8)

## 2022-06-21 NOTE — PROGRESS NOTES
Please contact patient with lab results.  Complete blood count, kidney and liver function are normal.  Blood sugar is minimally elevated at 108.  His blood sugar has been slightly elevated in the past and he needs to avoid excessive amounts of sweets and cut back on white bread, pasta, potatoes and rice.  Cholesterol panel is good.  His PSA or prostate cancer screening test is slightly elevated and has been trending upward over the past few years.  Please ask him if he is having any difficulty with urination or emptying bladder.  Is there any family history of prostate cancer in brothers, father or grandfather's.  If so let me know as I feel he may need referral to urologist.  Please let me know if he is agreeable and if so I will make referral.

## 2022-06-24 DIAGNOSIS — R97.20 ELEVATED PSA: Primary | ICD-10-CM

## 2022-07-18 ENCOUNTER — TELEPHONE (OUTPATIENT)
Dept: CARDIOLOGY | Facility: CLINIC | Age: 72
End: 2022-07-18

## 2022-07-18 RX ORDER — METOPROLOL TARTRATE 50 MG/1
TABLET, FILM COATED ORAL
Qty: 60 TABLET | Refills: 0 | OUTPATIENT
Start: 2022-07-18

## 2022-07-26 ENCOUNTER — OFFICE VISIT (OUTPATIENT)
Dept: UROLOGY | Facility: CLINIC | Age: 72
End: 2022-07-26

## 2022-07-26 VITALS
HEART RATE: 82 BPM | HEIGHT: 70 IN | DIASTOLIC BLOOD PRESSURE: 82 MMHG | SYSTOLIC BLOOD PRESSURE: 136 MMHG | WEIGHT: 238 LBS | RESPIRATION RATE: 16 BRPM | TEMPERATURE: 97.7 F | BODY MASS INDEX: 34.07 KG/M2 | OXYGEN SATURATION: 99 %

## 2022-07-26 DIAGNOSIS — R97.20 ELEVATED PROSTATE SPECIFIC ANTIGEN (PSA): ICD-10-CM

## 2022-07-26 DIAGNOSIS — R31.29 MICROSCOPIC HEMATURIA: Primary | ICD-10-CM

## 2022-07-26 LAB
BILIRUB BLD-MCNC: NEGATIVE MG/DL
CLARITY, POC: CLEAR
COLOR UR: YELLOW
EXPIRATION DATE: ABNORMAL
GLUCOSE UR STRIP-MCNC: NEGATIVE MG/DL
KETONES UR QL: NEGATIVE
LEUKOCYTE EST, POC: NEGATIVE
Lab: ABNORMAL
NITRITE UR-MCNC: NEGATIVE MG/ML
PH UR: 7 [PH] (ref 5–8)
PROT UR STRIP-MCNC: NEGATIVE MG/DL
RBC # UR STRIP: ABNORMAL /UL
SP GR UR: 1.01 (ref 1–1.03)
UROBILINOGEN UR QL: NORMAL

## 2022-07-26 PROCEDURE — 99204 OFFICE O/P NEW MOD 45 MIN: CPT | Performed by: PHYSICIAN ASSISTANT

## 2022-07-26 PROCEDURE — 81003 URINALYSIS AUTO W/O SCOPE: CPT | Performed by: PHYSICIAN ASSISTANT

## 2022-07-26 PROCEDURE — 51798 US URINE CAPACITY MEASURE: CPT | Performed by: PHYSICIAN ASSISTANT

## 2022-07-26 RX ORDER — METOPROLOL SUCCINATE 25 MG/1
TABLET, EXTENDED RELEASE ORAL
COMMUNITY
Start: 2022-06-29 | End: 2022-07-26

## 2022-07-26 NOTE — PROGRESS NOTES
Chief Complaint  Elevated PSA    Referring Provider  No ref. provider found    HPI  Mr. Arcos is a 71 y.o.  male who presents with an elevated PSA to 4.1 on 06/20/22.      Patient complains of weak urinary stream.  His IPSS score is 14.     Patient denies fevers, chills, nausea, vomiting, constipation, or flank pain.  Past  history is negative for BPH, frequent UTIs, gross hematuria, stones or other renal diseases. He has never seen Urology.       IPSS Questionnaire (AUA-7):  Incomplete emptying  Over the past month, how often have you had a sensation of not emptying your bladder completely after you finish?: Less than 1 time in 5 (07/26/22 1510)  Frequency  Over the past month, how often have you had to urinate again less than two hours after you finishing urinating ?: Less than 1 time in 5 (07/26/22 1510)  Intermittency  Over the past month, how often have you found you stopped and started again several time when you urinated ?: About half the time (07/26/22 1510)  Urgency  Over the last month, how difficult  have you found it to postpone urination ?: About half the time (07/26/22 1510)  Weak Stream  Over the past month, how often have you had a weak urinary stream ?: About half the time (07/26/22 1510)  Straining  Over the past month, how often have you had to push or strain to begin urination ?: Less than 1 time 5 (07/26/22 1510)  Nocturia  Over the past month, how many times did you most typically get up to urinate from the time you went to bed until the time you got up in the morning ?: Less than half the time (07/26/22 1510)  Quality of life due to urinary symptoms  If you were to spend the rest of your life with your urinary condition the way it is now, how would feel about that?: Mixed - about equally satisfied (07/26/22 1510)    Scores  Total IPSS Score: 14 (07/26/22 1510)       Past Medical History  Past Medical History:   Diagnosis Date   • Arrhythmia    • Arthritis    • Atrial fibrillation  ([facility])    • Full dentures    • GERD (gastroesophageal reflux disease)    • History of prediabetes    • Hyperlipidemia    • Hypertension    • LAUREL on CPAP    • Sleep apnea    • Vitamin D deficiency    • Wears glasses        Past Surgical History  Past Surgical History:   Procedure Laterality Date   • ANKLE SURGERY     • CARDIAC ELECTROPHYSIOLOGY PROCEDURE N/A 4/27/2022    Procedure: PVA (persistent), hold Tikosyn x 5 days;  Surgeon: William Hargrove MD;  Location: [facility] INVASIVE LOCATION;  Service: Cardiovascular;  Laterality: N/A;   • COLONOSCOPY      Complete   • POLYPECTOMY     • STOMACH SURGERY         Medications    Current Outpatient Medications:   •  amLODIPine (NORVASC) 5 MG tablet, Take 1 tablet by mouth Daily., Disp: 30 tablet, Rfl: 11  •  apixaban (Eliquis) 5 MG tablet tablet, Take 1 tablet by mouth 2 (Two) Times a Day., Disp: 360 tablet, Rfl: 3  •  atorvastatin (LIPITOR) 20 MG tablet, TAKE 1 TABLET DAILY, Disp: 90 tablet, Rfl: 3  •  Cholecalciferol (VITAMIN D-3) 1000 UNITS capsule, Take 1,000 Units by mouth Daily., Disp: , Rfl:   •  ipratropium (ATROVENT) 0.03 % nasal spray, 2 sprays into the nostril(s) as directed by provider Every 12 (Twelve) Hours., Disp: 1 each, Rfl: 3  •  lisinopril (PRINIVIL,ZESTRIL) 40 MG tablet, TAKE 1 TABLET DAILY (Patient taking differently: Take 40 mg by mouth Daily.), Disp: 90 tablet, Rfl: 1  •  Misc Natural Products (OSTEO BI-FLEX JOINT SHIELD PO), Take 1 tablet by mouth 2 (Two) Times a Day., Disp: , Rfl:   •  omeprazole (priLOSEC) 20 MG capsule, Take 1 capsule by mouth Daily., Disp: 90 capsule, Rfl: 1  •  sildenafil (VIAGRA) 100 MG tablet, TAKE 1 TABLET DAILY AS     NEEDED FOR ERECTILE        DYSFUNCTION, Disp: 25 tablet, Rfl: 1    Allergies  No Known Allergies    Social History  Social History     Tobacco Use   • Smoking status: Former Smoker     Packs/day: 2.50     Years: 15.00     Pack years: 37.50     Types: Cigarettes     Quit date: 4/1/1987     Years since quitting:  "35.3   • Smokeless tobacco: Never Used   Vaping Use   • Vaping Use: Never used   Substance Use Topics   • Alcohol use: No   • Drug use: No       Family History  Family History   Problem Relation Age of Onset   • Cancer Mother    • No Known Problems Father    • No Known Problems Sister    • Diabetes Half-Sister    • No Known Problems Half-Brother       He has no family history of prostate cancer.        Physical Exam  Visit Vitals  /82   Pulse 82   Temp 97.7 °F (36.5 °C) (Temporal)   Resp 16   Ht 177.8 cm (70\")   Wt 108 kg (238 lb)   SpO2 99%   BMI 34.15 kg/m²       Labs  Brief Urine Lab Results  (Last result in the past 365 days)      Color   Clarity   Blood   Leuk Est   Nitrite   Protein   CREAT   Urine HCG        07/26/22 1517 Yellow   Clear   1+   Negative   Negative   Negative                 Lab Results   Component Value Date    PSA 4.1 (H) 06/20/2022    PSA 3.710 06/17/2021    PSA 2.880 12/11/2019         Assessment  Mr. Arcos is a 71 y.o.  male who presents with elevated PSA to 4.1.  This is a new diagnosis of uncertain clinical prognosis.    I explained to the patient that an elevated PSA is a marker of risk of prostate cancer and a prostate biopsy would be the next step in diagnosis. I explained that sampling error can occur with any biopsy and there is a risk of potentially missing a cancer that may be present.    I discussed the risks, benefits, and alternatives to prostate biopsy, including hematuria, hematochezia, and hematospermia.  I also discussed the risk of diagnosing a clinically-insignificant prostate cancer.  I discussed the risks of sepsis, which can be minimized by prophylactic antibiotics.     With this plan in mind, we will obtain total to free PSA today and pending these results, make final recommendation for biopsy.       Plan  1. I have recommended TRUS biopsy of prostate if PSA is persistently elevated  2. I will provide a prescription for Ciprofloxacin 500mg PO BID and " Keflex 500mg PO BID for 3 days to start the day prior to biopsy  3. Pt to hold Eliquis prior to biopsy, per Meliton- will communicate length of hold prior to biopsy

## 2022-07-27 LAB
APPEARANCE UR: CLEAR
BACTERIA #/AREA URNS HPF: NORMAL /HPF
BILIRUB UR QL STRIP: NEGATIVE
CASTS URNS MICRO: NORMAL
COLOR UR: YELLOW
EPI CELLS #/AREA URNS HPF: NORMAL /HPF
GLUCOSE UR QL STRIP: NEGATIVE
HGB UR QL STRIP: ABNORMAL
KETONES UR QL STRIP: NEGATIVE
LEUKOCYTE ESTERASE UR QL STRIP: NEGATIVE
NITRITE UR QL STRIP: NEGATIVE
PH UR STRIP: 7.5 [PH] (ref 5–8)
PROT UR QL STRIP: NEGATIVE
RBC #/AREA URNS HPF: NORMAL /HPF
SP GR UR STRIP: 1.01 (ref 1–1.03)
UROBILINOGEN UR STRIP-MCNC: ABNORMAL MG/DL
WBC #/AREA URNS HPF: NORMAL /HPF

## 2022-07-28 LAB
PSA FREE MFR SERPL: 14 %
PSA FREE SERPL-MCNC: 0.49 NG/ML
PSA SERPL-MCNC: 3.5 NG/ML (ref 0–4)

## 2022-08-02 RX ORDER — OMEPRAZOLE 20 MG/1
20 CAPSULE, DELAYED RELEASE ORAL DAILY
Qty: 90 CAPSULE | Refills: 1 | Status: SHIPPED | OUTPATIENT
Start: 2022-08-02 | End: 2022-12-20 | Stop reason: SDUPTHER

## 2022-08-02 NOTE — TELEPHONE ENCOUNTER
Caller: [facility] - [place], [place] - [address] AT [address]AL TO [facility] - [phone]  - [phone] FX    Relationship: Pharmacy    Best call back number: [phone]    Requested Prescriptions:   Requested Prescriptions     Pending Prescriptions Disp Refills   • omeprazole (priLOSEC) 20 MG capsule 90 capsule 1     Sig: Take 1 capsule by mouth Daily.        Pharmacy where request should be sent:  [facility] - [place], [place] - [address] AT [address]AL TO [facility] - [phone]  - [phone] FX    Does the patient have less than a 3 day supply:  [x] Yes  [] No    Rosie Cortez Rep   08/02/22 14:17 EDT

## 2022-08-16 ENCOUNTER — OFFICE VISIT (OUTPATIENT)
Dept: CARDIOLOGY | Facility: CLINIC | Age: 72
End: 2022-08-16

## 2022-08-16 VITALS
WEIGHT: 244 LBS | OXYGEN SATURATION: 97 % | HEART RATE: 72 BPM | SYSTOLIC BLOOD PRESSURE: 138 MMHG | HEIGHT: 70 IN | BODY MASS INDEX: 34.93 KG/M2 | DIASTOLIC BLOOD PRESSURE: 70 MMHG

## 2022-08-16 DIAGNOSIS — I48.19 PERSISTENT ATRIAL FIBRILLATION: Primary | ICD-10-CM

## 2022-08-16 PROCEDURE — 99213 OFFICE O/P EST LOW 20 MIN: CPT | Performed by: NURSE PRACTITIONER

## 2022-08-16 PROCEDURE — 93000 ELECTROCARDIOGRAM COMPLETE: CPT | Performed by: NURSE PRACTITIONER

## 2022-08-16 NOTE — PROGRESS NOTES
Cardiac Electrophysiology Outpatient Note  [facility] at [facility]    Office Visit     Erik Arcos  [id number]  08/16/2022    Primary Care Physician: Vermeesch, Marilyn K, MD    Referred By: No ref. provider found    CC:   Chief Complaint   Patient presents with   • Persistent atrial fibrillation     Problem List:   1. Persistent Atrial Fibrillation   a. CHADSVASC: 2  b. Diagnosed incidentally in the setting of pre op for cataract surgery   c. Echo: 10/28/21: Normal LVEF, Mild concentric LVH, severe LA dilation   d. Stress Test 10/28/21: normal ,myocardial perfusion imaging no evidence of ischemia. There is a coronary artery calcification on the CT portion of the stress test. Findings consistent with low risk study  e. 2/1/22 ECV with recurrence within 1 month while on Sotalol.   f. PVA 04/27/2022  2. HTN   3. HLD  4. GERD  5. LAUREL, CPAP compliant  6. Surgical History  a. Ankle surgery  2010     History of Present Illness:   Erik Arcos is a 71 y.o. male who presents to the electrophysiology clinic for follow up of atrial fibrillation status post PVA with Dr. Hargrove on 04/27/2022.  Pt reports he is doing well since his ablation. Denies chest pain, SOA. Does endorse an occasional flutter that will last a few seconds and go away. Denies bleeding problems on the eliquis. Denies any problems with his groin sites after the procedure.    Past Surgical History:   Procedure Laterality Date   • ANKLE SURGERY     • CARDIAC ELECTROPHYSIOLOGY PROCEDURE N/A 4/27/2022    Procedure: PVA (persistent), hold Tikosyn x 5 days;  Surgeon: William Hargrove MD;  Location: [facility] INVASIVE LOCATION;  Service: Cardiovascular;  Laterality: N/A;   • COLONOSCOPY      Complete   • POLYPECTOMY     • STOMACH SURGERY         Family History   Problem Relation Age of Onset   • Cancer Mother    • No Known Problems Father    • No Known Problems Sister    • Diabetes Half-Sister    • No Known Problems Half-Brother         Social History     Socioeconomic History   • Marital status:    Tobacco Use   • Smoking status: Former Smoker     Packs/day: 2.50     Years: 15.00     Pack years: 37.50     Types: Cigarettes     Quit date: 1987     Years since quittin.4   • Smokeless tobacco: Never Used   Vaping Use   • Vaping Use: Never used   Substance and Sexual Activity   • Alcohol use: No   • Drug use: No   • Sexual activity: Defer         Current Outpatient Medications:   •  amLODIPine (NORVASC) 5 MG tablet, Take 1 tablet by mouth Daily., Disp: 30 tablet, Rfl: 11  •  apixaban (Eliquis) 5 MG tablet tablet, Take 1 tablet by mouth 2 (Two) Times a Day., Disp: 360 tablet, Rfl: 3  •  atorvastatin (LIPITOR) 20 MG tablet, TAKE 1 TABLET DAILY, Disp: 90 tablet, Rfl: 3  •  Cholecalciferol (VITAMIN D-3) 1000 UNITS capsule, Take 1,000 Units by mouth Daily., Disp: , Rfl:   •  ipratropium (ATROVENT) 0.03 % nasal spray, 2 sprays into the nostril(s) as directed by provider Every 12 (Twelve) Hours., Disp: 1 each, Rfl: 3  •  lisinopril (PRINIVIL,ZESTRIL) 40 MG tablet, TAKE 1 TABLET DAILY (Patient taking differently: Take 40 mg by mouth Daily.), Disp: 90 tablet, Rfl: 1  •  Misc Natural Products (OSTEO BI-FLEX JOINT SHIELD PO), Take 1 tablet by mouth 2 (Two) Times a Day., Disp: , Rfl:   •  omeprazole (priLOSEC) 20 MG capsule, Take 1 capsule by mouth Daily., Disp: 90 capsule, Rfl: 1  •  sildenafil (VIAGRA) 100 MG tablet, TAKE 1 TABLET DAILY AS     NEEDED FOR ERECTILE        DYSFUNCTION, Disp: 25 tablet, Rfl: 1    Allergies:   No Known Allergies    Review of Systems:  Review of Systems   Constitutional: Negative for malaise/fatigue.   Cardiovascular: Positive for irregular heartbeat. Negative for chest pain, dyspnea on exertion, leg swelling, near-syncope, orthopnea, palpitations, paroxysmal nocturnal dyspnea and syncope.   Respiratory: Negative for shortness of breath.    Hematologic/Lymphatic: Negative for bleeding problem.   Neurological:  "Negative for dizziness and light-headedness.        Vital Signs: Blood pressure 138/70, pulse 72, height 177.8 cm (70\"), weight 111 kg (244 lb), SpO2 97 %.    Physical Exam  Vitals reviewed.   Cardiovascular:      Rate and Rhythm: Normal rate and regular rhythm.      Heart sounds: Normal heart sounds.   Pulmonary:      Effort: Pulmonary effort is normal.      Breath sounds: Normal breath sounds.   Musculoskeletal:      Right lower leg: No edema.      Left lower leg: No edema.   Skin:     General: Skin is warm and dry.   Neurological:      Mental Status: He is alert and oriented to person, place, and time.   Psychiatric:         Behavior: Behavior is cooperative.         Lab Results   Component Value Date    GLUCOSE 108 (H) 06/20/2022    CALCIUM 9.4 06/20/2022     06/20/2022    K 4.6 06/20/2022    CO2 23 06/20/2022    CL 97 06/20/2022    BUN 17 06/20/2022    CREATININE 0.81 06/20/2022    EGFRIFAFRI 117 06/17/2021    EGFRIFNONA 96 06/17/2021    BCR 21 06/20/2022    ANIONGAP 10.0 04/25/2022     Lab Results   Component Value Date    WBC 5.9 06/20/2022    HGB 13.4 06/20/2022    HCT 41.1 06/20/2022    MCV 88 06/20/2022     06/20/2022     Lab Results   Component Value Date    INR 1.11 04/25/2022    INR 1.03 04/19/2022    PROTIME 14.3 04/25/2022    PROTIME 13.4 04/19/2022     Lab Results   Component Value Date    TSH 2.890 04/25/2022        Results for orders placed during the hospital encounter of 10/28/21    Adult Transthoracic Echo Complete W/ Cont if Necessary Per Protocol    Interpretation Summary  · Left ventricular wall thickness is consistent with mild concentric hypertrophy.  · The right ventricular cavity is mildly dilated.  · Left atrial volume is severely increased.  · The right atrial cavity is moderately dilated.  · The underlying rhythm appears to be atrial fibrillation  · The left ventricular ejection fraction is 60%      I personally viewed and interpreted the patient's EKG/Telemetry/lab " data.      ECG 12 Lead    Date/Time: 8/16/2022 3:20 PM  Performed by: Monet Clay APRN  Authorized by: Monet Clay APRN   Comparison: compared with previous ECG from 5/31/2022  Similar to previous ECG  Rhythm: sinus rhythm  BPM: 72    Clinical impression: normal ECG              Assessment & Plan    1. Persistent atrial fibrillation (HCC)  -Status post PVA in April 2022.  He reports that he has been doing well since that time does report an occasional flutter that will last a few seconds and go away this happens a couple times a week.  Denies any complications with puncture sites, these have now healed well.  He does continue to take his Eliquis for stroke prevention.  Reports that he is feeling well overall, no current concerns.  -He will keep his next appointment in November and call us with any problems prior to this.     Follow Up:  Return for Next scheduled follow up.    SONA Munoz  08/16/2022

## 2022-09-21 DIAGNOSIS — I10 ESSENTIAL HYPERTENSION: ICD-10-CM

## 2022-09-21 DIAGNOSIS — I48.91 NEW ONSET ATRIAL FIBRILLATION: ICD-10-CM

## 2022-09-21 RX ORDER — METOPROLOL SUCCINATE 25 MG/1
TABLET, EXTENDED RELEASE ORAL
Qty: 90 TABLET | Refills: 3 | OUTPATIENT
Start: 2022-09-21

## 2022-09-21 RX ORDER — LISINOPRIL 40 MG/1
TABLET ORAL
Qty: 90 TABLET | Refills: 1 | Status: SHIPPED | OUTPATIENT
Start: 2022-09-21 | End: 2022-12-20 | Stop reason: SDUPTHER

## 2022-09-21 NOTE — TELEPHONE ENCOUNTER
Medication was discontinued by patient's cardiologist Dr. Wen on 11/15/2021.  Per medical records patient is no longer on this medication.

## 2022-10-12 RX ORDER — AMLODIPINE BESYLATE 5 MG/1
TABLET ORAL
Qty: 90 TABLET | Refills: 3 | OUTPATIENT
Start: 2022-10-12

## 2022-10-13 RX ORDER — IPRATROPIUM BROMIDE 21 UG/1
SPRAY, METERED NASAL
Qty: 90 EACH | Refills: 1 | Status: SHIPPED | OUTPATIENT
Start: 2022-10-13 | End: 2023-04-06

## 2022-11-04 ENCOUNTER — OFFICE VISIT (OUTPATIENT)
Dept: CARDIOLOGY | Facility: CLINIC | Age: 72
End: 2022-11-04

## 2022-11-04 VITALS
OXYGEN SATURATION: 96 % | DIASTOLIC BLOOD PRESSURE: 80 MMHG | WEIGHT: 247 LBS | HEART RATE: 75 BPM | SYSTOLIC BLOOD PRESSURE: 138 MMHG | BODY MASS INDEX: 35.36 KG/M2 | HEIGHT: 70 IN

## 2022-11-04 DIAGNOSIS — I48.19 PERSISTENT ATRIAL FIBRILLATION: ICD-10-CM

## 2022-11-04 PROCEDURE — 93000 ELECTROCARDIOGRAM COMPLETE: CPT | Performed by: PHYSICIAN ASSISTANT

## 2022-11-04 PROCEDURE — 99214 OFFICE O/P EST MOD 30 MIN: CPT | Performed by: PHYSICIAN ASSISTANT

## 2022-11-04 NOTE — PROGRESS NOTES
Cardiac Electrophysiology Outpatient Note  [facility] at [facility]    Office Visit     Erik Arcos  [id number]      Primary Care Physician: Vermeesch, Marilyn K, MD    Referred By: No ref. provider found    CC:   Chief Complaint   Patient presents with   • Persistent atrial fibrillation (HCC)     Problem List:   1. Persistent Atrial Fibrillation   a. CHADSVASC: 2  b. Diagnosed incidentally in the setting of pre op for cataract surgery   c. Echo: 10/28/21: Normal LVEF, Mild concentric LVH, severe LA dilation   d. Stress Test 10/28/21: normal ,myocardial perfusion imaging no evidence of ischemia. There is a coronary artery calcification on the CT portion of the stress test. Findings consistent with low risk study  e. 2/1/22 ECV with recurrence within 1 month while on Sotalol.   f. PVA 04/27/2022  2. HTN   3. HLD  4. GERD  5. LAUREL, CPAP compliant  6. Surgical History  a. Ankle surgery  2010     History of Present Illness:   Erik Arcos is a 71 y.o. 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 Hargrove on 04/27/2022. Since we last saw the pt, pt denies any AF episodes, SOB, CP, LH, and dizziness. Denies any hospitalizations, ER visits, bleeding, or TIA/CVA symptoms. Overall feels well.    Past Surgical History:   Procedure Laterality Date   • ANKLE SURGERY     • CARDIAC ELECTROPHYSIOLOGY PROCEDURE N/A 4/27/2022    Procedure: PVA (persistent), hold Tikosyn x 5 days;  Surgeon: William Hargrove MD;  Location: [facility] INVASIVE LOCATION;  Service: Cardiovascular;  Laterality: N/A;   • COLONOSCOPY      Complete   • POLYPECTOMY     • STOMACH SURGERY         Family History   Problem Relation Age of Onset   • Cancer Mother    • No Known Problems Father    • No Known Problems Sister    • Diabetes Half-Sister    • No Known Problems Half-Brother        Social History     Socioeconomic  History   • Marital status:    Tobacco Use   • Smoking status: Former     Packs/day: 2.50     Years: 15.00     Pack years: 37.50     Types: Cigarettes     Quit date: 1987     Years since quittin.6   • Smokeless tobacco: Never   Vaping Use   • Vaping Use: Never used   Substance and Sexual Activity   • Alcohol use: No   • Drug use: No   • Sexual activity: Defer         Current Outpatient Medications:   •  amLODIPine (NORVASC) 5 MG tablet, Take 1 tablet by mouth Daily., Disp: 30 tablet, Rfl: 11  •  apixaban (Eliquis) 5 MG tablet tablet, Take 1 tablet by mouth 2 (Two) Times a Day., Disp: 360 tablet, Rfl: 3  •  atorvastatin (LIPITOR) 20 MG tablet, TAKE 1 TABLET DAILY, Disp: 90 tablet, Rfl: 3  •  Cholecalciferol (VITAMIN D-3) 1000 UNITS capsule, Take 1,000 Units by mouth Daily., Disp: , Rfl:   •  ipratropium (ATROVENT) 0.03 % nasal spray, INSTILL 2 SPRAYS INTO THE NOSTRIL(S) AS DIRECTED BY PROVIDER EVERY 12 (TWELVE) HOURS., Disp: 90 each, Rfl: 1  •  lisinopril (PRINIVIL,ZESTRIL) 40 MG tablet, TAKE 1 TABLET DAILY, Disp: 90 tablet, Rfl: 1  •  Misc Natural Products (OSTEO BI-FLEX JOINT SHIELD PO), Take 1 tablet by mouth 2 (Two) Times a Day., Disp: , Rfl:   •  omeprazole (priLOSEC) 20 MG capsule, Take 1 capsule by mouth Daily., Disp: 90 capsule, Rfl: 1  •  sildenafil (VIAGRA) 100 MG tablet, TAKE 1 TABLET DAILY AS     NEEDED FOR ERECTILE        DYSFUNCTION, Disp: 25 tablet, Rfl: 1    Allergies:   No Known Allergies    Review of Systems:  Review of Systems   Constitutional: Negative for malaise/fatigue.   Cardiovascular: Positive for irregular heartbeat. Negative for chest pain, dyspnea on exertion, leg swelling, near-syncope, orthopnea, palpitations, paroxysmal nocturnal dyspnea and syncope.   Respiratory: Negative for shortness of breath.    Hematologic/Lymphatic: Negative for bleeding problem.   Neurological: Negative for dizziness and light-headedness.        Vital Signs: Blood pressure 138/80, pulse 75,  "height 177.8 cm (70\"), weight 112 kg (247 lb), SpO2 96 %.    Physical Exam  Vitals reviewed.   Cardiovascular:      Rate and Rhythm: Normal rate and regular rhythm.      Heart sounds: Normal heart sounds.   Pulmonary:      Effort: Pulmonary effort is normal.      Breath sounds: Normal breath sounds.   Musculoskeletal:      Right lower leg: No edema.      Left lower leg: No edema.   Skin:     General: Skin is warm and dry.   Neurological:      Mental Status: He is alert and oriented to person, place, and time.   Psychiatric:         Behavior: Behavior is cooperative.         Lab Results   Component Value Date    GLUCOSE 108 (H) 06/20/2022    CALCIUM 9.4 06/20/2022     06/20/2022    K 4.6 06/20/2022    CO2 23 06/20/2022    CL 97 06/20/2022    BUN 17 06/20/2022    CREATININE 0.81 06/20/2022    EGFRIFAFRI 117 06/17/2021    EGFRIFNONA 96 06/17/2021    BCR 21 06/20/2022    ANIONGAP 10.0 04/25/2022     Lab Results   Component Value Date    WBC 5.9 06/20/2022    HGB 13.4 06/20/2022    HCT 41.1 06/20/2022    MCV 88 06/20/2022     06/20/2022     Lab Results   Component Value Date    INR 1.11 04/25/2022    INR 1.03 04/19/2022    PROTIME 14.3 04/25/2022    PROTIME 13.4 04/19/2022     Lab Results   Component Value Date    TSH 2.890 04/25/2022        Results for orders placed during the hospital encounter of 10/28/21    Adult Transthoracic Echo Complete W/ Cont if Necessary Per Protocol    Interpretation Summary  · Left ventricular wall thickness is consistent with mild concentric hypertrophy.  · The right ventricular cavity is mildly dilated.  · Left atrial volume is severely increased.  · The right atrial cavity is moderately dilated.  · The underlying rhythm appears to be atrial fibrillation  · The left ventricular ejection fraction is 60%      I personally viewed and interpreted the patient's EKG/Telemetry/lab data.      ECG 12 Lead    Date/Time: 11/4/2022 3:00 PM  Performed by: Eddie Encarnacion PA  Authorized " by: Eddie Encarnacion PA   Rhythm: sinus rhythm  Rate: normal  Conduction: conduction normal  ST Segments: ST segments normal  QRS axis: normal    Clinical impression: normal ECG              Assessment & Plan    1. Persistent atrial fibrillation (HCC)  -Status post PVA in April 2022.      2. HTN: Controlled  3. LAUREL, CPAP compliance.     Continue to focus on risk factors for Afib, diet , exercise, weight loss and CPAP use   Return to see Dr. Hargrove as scheduled.   Electronically signed by ANANYA George, 11/04/22, 3:00 PM EDT.

## 2022-11-14 RX ORDER — AMLODIPINE BESYLATE 5 MG/1
TABLET ORAL
Qty: 90 TABLET | Refills: 0 | Status: SHIPPED | OUTPATIENT
Start: 2022-11-14 | End: 2022-12-28 | Stop reason: SDUPTHER

## 2022-11-21 ENCOUNTER — OFFICE VISIT (OUTPATIENT)
Dept: SLEEP MEDICINE | Facility: CLINIC | Age: 72
End: 2022-11-21

## 2022-11-21 VITALS
BODY MASS INDEX: 35.79 KG/M2 | OXYGEN SATURATION: 97 % | WEIGHT: 250 LBS | HEART RATE: 83 BPM | SYSTOLIC BLOOD PRESSURE: 158 MMHG | DIASTOLIC BLOOD PRESSURE: 88 MMHG | HEIGHT: 70 IN

## 2022-11-21 DIAGNOSIS — G47.33 OBSTRUCTIVE SLEEP APNEA, ADULT: Primary | ICD-10-CM

## 2022-11-21 DIAGNOSIS — I48.91 ATRIAL FIBRILLATION, UNSPECIFIED TYPE: ICD-10-CM

## 2022-11-21 DIAGNOSIS — E66.09 CLASS 1 OBESITY DUE TO EXCESS CALORIES WITHOUT SERIOUS COMORBIDITY WITH BODY MASS INDEX (BMI) OF 34.0 TO 34.9 IN ADULT: ICD-10-CM

## 2022-11-21 PROCEDURE — 99213 OFFICE O/P EST LOW 20 MIN: CPT | Performed by: NURSE PRACTITIONER

## 2022-11-21 NOTE — PROGRESS NOTES
Sleep Clinic Follow Up Note    Chief Complaint  Sleep Apnea (Follow up)    Subjective     History of Present Illness (from previous encounter on 5/16/2022):  Patient was last seen in clinic January 3, 2022.  He has obstructive sleep apnea and had an index of 52.5 on his previous study.  He has a had atrial fibrillation and obesity.  He was placed on CPAP.  He says he has been using his CPAP nightly.  He generally feels better.  He had an ablation for the atrial fibrillation 2 weeks ago.  He says he usually feels rested in the morning.  He denies any real problems with the machine or mask. (End copied text).    Interval History:  Erik Arcos is a 72 y.o. male returns for follow up and compliance of CPAP therapy. The patient was last seen on 5/16/2022. Overall the patient feels good with regard to therapy. The device appears to be working appropriately. On average the patient sleeps 4-6 hours per night. Then on the weekend 11-12 hours. On 3rd shift. The patient wakes 1 times per night.     The patient reports the following changes to their medical and medication history since they were last seen:  None    Further details are as follows:    [place] Scale is (out of 24): Total score: 11     Weight:  Current Weight: 250 pounds    Weight change in the last year:  gain: 5 lbs    The patient's relevant past medical, surgical, family, and social history reviewed and updated in Epic as appropriate.    PMH:    Past Medical History:   Diagnosis Date   • Arrhythmia    • Arthritis    • Atrial fibrillation (HCC)    • Full dentures    • GERD (gastroesophageal reflux disease)    • History of prediabetes    • Hyperlipidemia    • Hypertension    • LAUREL on CPAP    • Sleep apnea    • Vitamin D deficiency    • Wears glasses      Past Surgical History:   Procedure Laterality Date   • ANKLE SURGERY     • CARDIAC ELECTROPHYSIOLOGY PROCEDURE N/A 4/27/2022    Procedure: PVA (persistent), hold Tikosyn x 5 days;  Surgeon: William Hargrove MD;  " Location: [facility] INVASIVE LOC[facility];  Service: Cardiovascular;  Laterality: N/A;   • COLONOSCOPY      Complete   • POLYPECTOMY     • STOMACH SURGERY         No Known Allergies    MEDS:  Prior to Admission medications    Medication Sig Start Date End Date Taking? Authorizing Provider   amLODIPine (NORVASC) 5 MG tablet TAKE 1 TABLET BY MOUTH EVERY DAY 11/14/22   Vincenzo Wen MD   apixaban (Eliquis) 5 MG tablet tablet Take 1 tablet by mouth 2 (Two) Times a Day. 11/4/22   Eddie Encarnacion PA   atorvastatin (LIPITOR) 20 MG tablet TAKE 1 TABLET DAILY 5/16/22   Vermeesch, Marilyn K, MD   Cholecalciferol (VITAMIN D-3) 1000 UNITS capsule Take 1,000 Units by mouth Daily. 4/9/13   ProviderFuentes MD   ipratropium (ATROVENT) 0.03 % nasal spray INSTILL 2 SPRAYS INTO THE NOSTRIL(S) AS DIRECTED BY PROVIDER EVERY 12 (TWELVE) HOURS. 10/13/22   Vermeesch, Marilyn K, MD   lisinopril (PRINIVIL,ZESTRIL) 40 MG tablet TAKE 1 TABLET DAILY 9/21/22   Vermeesch, Marilyn K, MD   Misc Natural Products (OSTEO BI-FLEX JOINT SHIELD PO) Take 1 tablet by mouth 2 (Two) Times a Day.    ProviderFuentes MD   omeprazole (priLOSEC) 20 MG capsule Take 1 capsule by mouth Daily. 8/2/22   Vermeesch, Marilyn K, MD   sildenafil (VIAGRA) 100 MG tablet TAKE 1 TABLET DAILY AS     NEEDED FOR ERECTILE        DYSFUNCTION 6/4/22   Vermeesch, Marilyn K, MD         FH:  Family History   Problem Relation Age of Onset   • Cancer Mother    • No Known Problems Father    • No Known Problems Sister    • Diabetes Half-Sister    • No Known Problems Half-Brother        Objective   Vital Signs:  /88   Pulse 83   Ht 177.8 cm (70\")   Wt 113 kg (250 lb)   SpO2 97%   BMI 35.87 kg/m²           Physical Exam  Vitals reviewed.   Constitutional:       Appearance: Normal appearance.   HENT:      Head: Normocephalic and atraumatic.      Nose: Nose normal.      Mouth/Throat:      Mouth: Mucous membranes are moist.   Cardiovascular:      Rate and Rhythm: " Normal rate and regular rhythm.      Heart sounds: No murmur heard.    No friction rub. No gallop.   Pulmonary:      Effort: Pulmonary effort is normal. No respiratory distress.      Breath sounds: Normal breath sounds. No wheezing or rhonchi.   Neurological:      Mental Status: He is alert and oriented to person, place, and time.   Psychiatric:         Behavior: Behavior normal.         Mallampati Score: III (soft and hard palate and base of uvula visible)      CPAP Report:  AHI: 3.4/h  Days of Usage: 231/232 (99.6%)  Number of Days Greater than 4 hours: 227/232 (97.8%)  Average time (days used): 7 hours 39 minutes  95th Percentile Pressure: 12.6 cm H2O  Settings:       Result Review :              Assessment and Plan  Erik Arcos is a 72 y.o. male who returns for follow-up compliance of PAP therapy.  Patient is in full compliance with greater than 4-hour usage at 97.8%, and AHI of 3.4/H. Will refill PAP patient's supplies any return for follow-up compliance in 1 year or sooner should he have any further questions or concerns.    Diagnoses and all orders for this visit:    1. Obstructive sleep apnea, adult (Primary)  -     PAP Therapy    2. Atrial fibrillation, unspecified type (HCC)    3. Class 1 obesity due to excess calories without serious comorbidity with body mass index (BMI) of 34.0 to 34.9 in adult           The patient continues to use and benefit from CPAP therapy.    1. The patient was counseled regarding multimodal approach with healthy nutrition, healthy sleep, regular physical activity, social activities, counseling, and medications. Encouraged to practice lateral sleep position. Avoid alcohol and sedatives close to bedtime.     2.  We will refill supplies x1 year.  Return to clinic 1 year or sooner if symptoms warrant. I have reviewed the results of my evaluation and impression and discussed my recommendations in detail with the patient.    Follow Up  Return in about 1 year (around 11/21/2023)  for Recheck.  Patient was given instructions and counseling regarding his condition or for health maintenance advice. Please see specific information pulled into the AVS if appropriate.       SONA Dia, [facility]-BC  Pulmonology, Critical Care, and Sleep Medicine  Electronically signed by SONA Kothari, 11/21/22, 7:53 AM EST.

## 2022-12-20 ENCOUNTER — OFFICE VISIT (OUTPATIENT)
Dept: INTERNAL MEDICINE | Facility: CLINIC | Age: 72
End: 2022-12-20

## 2022-12-20 VITALS
DIASTOLIC BLOOD PRESSURE: 68 MMHG | SYSTOLIC BLOOD PRESSURE: 138 MMHG | HEART RATE: 85 BPM | OXYGEN SATURATION: 98 % | HEIGHT: 70 IN | TEMPERATURE: 98 F | BODY MASS INDEX: 35.07 KG/M2 | WEIGHT: 245 LBS

## 2022-12-20 DIAGNOSIS — I10 PRIMARY HYPERTENSION: Primary | ICD-10-CM

## 2022-12-20 DIAGNOSIS — E78.5 HYPERLIPIDEMIA, UNSPECIFIED HYPERLIPIDEMIA TYPE: ICD-10-CM

## 2022-12-20 DIAGNOSIS — G47.33 OSA (OBSTRUCTIVE SLEEP APNEA): ICD-10-CM

## 2022-12-20 DIAGNOSIS — K21.00 GASTROESOPHAGEAL REFLUX DISEASE WITH ESOPHAGITIS, UNSPECIFIED WHETHER HEMORRHAGE: ICD-10-CM

## 2022-12-20 DIAGNOSIS — Z12.11 ENCOUNTER FOR SCREENING COLONOSCOPY: ICD-10-CM

## 2022-12-20 DIAGNOSIS — I48.0 PAROXYSMAL ATRIAL FIBRILLATION: ICD-10-CM

## 2022-12-20 DIAGNOSIS — R73.03 PREDIABETES: ICD-10-CM

## 2022-12-20 PROBLEM — I48.19 PERSISTENT ATRIAL FIBRILLATION: Status: RESOLVED | Noted: 2021-09-22 | Resolved: 2022-12-20

## 2022-12-20 PROCEDURE — 90662 IIV NO PRSV INCREASED AG IM: CPT | Performed by: INTERNAL MEDICINE

## 2022-12-20 PROCEDURE — 99214 OFFICE O/P EST MOD 30 MIN: CPT | Performed by: INTERNAL MEDICINE

## 2022-12-20 PROCEDURE — G0008 ADMIN INFLUENZA VIRUS VAC: HCPCS | Performed by: INTERNAL MEDICINE

## 2022-12-20 RX ORDER — LISINOPRIL 40 MG/1
40 TABLET ORAL DAILY
Qty: 90 TABLET | Refills: 1 | Status: SHIPPED | OUTPATIENT
Start: 2022-12-20

## 2022-12-20 RX ORDER — OMEPRAZOLE 20 MG/1
20 CAPSULE, DELAYED RELEASE ORAL DAILY
Qty: 90 CAPSULE | Refills: 1 | Status: SHIPPED | OUTPATIENT
Start: 2022-12-20

## 2022-12-20 NOTE — PROGRESS NOTES
Chief Complaint   Patient presents with   • Follow-up     6 months for GERD, HLD, HTN, and prediabetes.      Subjective   Erik Arcos is a 72 y.o. male.     History of Present Illness  Here for follow-up of HTN, HLD, A. fib, GERD, ED, preDM and LAUREL on CPAP.   HTN/HLD/A. fib- BP is well controlled today.  No CP, palpitations. He gets SOA with exertion.  Some edema at times.  He underwent ablation for A. fib in April of this year.  Saw cardiology in November and EKG documented normal sinus rhythm.  Echocardiogram revealed enlarged/dilated left atrium with ejection fraction of 60%.  He takes his lipitor every night.  PreDM- he is avoiding sweets, BS runs less than 115.  A1c 5.4 seven month ago.  Last eye exam was over a yr ago.    GERD with Barretts-he remains on omeprazole for GERD, well controlled.  EGD in Nov 2020 reveals ongoing Barretts.   Vit d def- he remains on vit D 1000 u  LAUREL-currently on CPAP and tolerating this well.  He uses his CPAP every night and he sleeps well.   ED- he uses a whole tab of viagra and it works well.  He awakens about 1-2 times a night to urinate.  No hesitancy or dribbling.  Has been seeing urologist.  PSA has been elevated in the past but most recently was down to normal range.  HCM- colonoscopy done per Dr Delaney June 2017 next one due 2022.  He has had both pneumococcal vaccines, 1 hepatitis A vaccine and Covid vaccines.  No documented shingles vaccine or recent COVID booster or seasonal flu vaccine.       The following portions of the patient's history were reviewed and updated as appropriate: allergies, current medications, past family history, past medical history, past social history, past surgical history and problem list.    Review of Systems   Constitutional: Negative for activity change, appetite change and unexpected weight change.   Eyes: Negative for visual disturbance.   Respiratory: Positive for shortness of breath.    Cardiovascular: Negative for chest pain,  "palpitations and leg swelling.   Gastrointestinal: Negative for abdominal pain.   Genitourinary: Negative for decreased urine volume and difficulty urinating.   Musculoskeletal: Negative for back pain.   Neurological: Negative for numbness and headaches.   Psychiatric/Behavioral: Negative for dysphoric mood and sleep disturbance. The patient is not nervous/anxious.        Objective   /68   Pulse 85   Temp 98 °F (36.7 °C)   Ht 177.8 cm (70\")   Wt 111 kg (245 lb)   SpO2 98%   BMI 35.15 kg/m²   Body mass index is 35.15 kg/m².  Physical Exam  Vitals and nursing note reviewed.   Constitutional:       General: He is not in acute distress.     Appearance: Normal appearance. He is well-developed. He is obese. He is not ill-appearing.      Comments: Kind and pleasant man, appears stated age and in NAD today   HENT:      Head: Normocephalic and atraumatic.      Right Ear: External ear normal.      Left Ear: External ear normal.   Eyes:      General:         Right eye: No discharge.         Left eye: No discharge.      Extraocular Movements: Extraocular movements intact.      Conjunctiva/sclera: Conjunctivae normal.   Neck:      Thyroid: No thyromegaly.      Vascular: No carotid bruit.      Comments: No thyromegaly or mass  Cardiovascular:      Rate and Rhythm: Normal rate and regular rhythm.      Pulses: Normal pulses.      Heart sounds: Normal heart sounds. No murmur heard.  Pulmonary:      Effort: Pulmonary effort is normal. No respiratory distress.      Breath sounds: Normal breath sounds. No wheezing.   Abdominal:      General: Bowel sounds are normal. There is no distension.      Palpations: Abdomen is soft.      Tenderness: There is no abdominal tenderness.      Comments: Obesity limits exam   Musculoskeletal:         General: Normal range of motion.      Cervical back: Normal range of motion and neck supple.      Right lower leg: No edema.      Left lower leg: No edema.   Lymphadenopathy:      Cervical: No " cervical adenopathy.   Skin:     General: Skin is warm.      Findings: No rash.   Neurological:      General: No focal deficit present.      Mental Status: He is alert and oriented to person, place, and time. Mental status is at baseline.      Cranial Nerves: No cranial nerve deficit.      Motor: No weakness.      Coordination: Coordination normal.      Gait: Gait normal.   Psychiatric:         Mood and Affect: Mood normal.         Behavior: Behavior normal.         Thought Content: Thought content normal.         Judgment: Judgment normal.         Assessment & Plan   Erik Arcos is here today and the following problems have been addressed:      Diagnoses and all orders for this visit:    1. Primary hypertension (Primary)    2. Hyperlipidemia, unspecified hyperlipidemia type    3. Prediabetes    4. Gastroesophageal reflux disease with esophagitis, unspecified whether hemorrhage    5. LAUREL (obstructive sleep apnea)    6. Encounter for screening colonoscopy  -     Ambulatory Referral to Gastroenterology    7. Paroxysmal atrial fibrillation (HCC)    Other orders  -     lisinopril (PRINIVIL,ZESTRIL) 40 MG tablet; Take 1 tablet by mouth Daily.  Dispense: 90 tablet; Refill: 1  -     omeprazole (priLOSEC) 20 MG capsule; Take 1 capsule by mouth Daily.  Dispense: 90 capsule; Refill: 1  -     Fluzone High-Dose 65+yrs (4313-2956)        Follow heart healthy/low salt/low-cholesterol diet, avoid excessive amounts of sweets  Avoid processed foods  Monitor blood pressure as discussed  Exercise as tolerated -he ambulates a lot at work as , no formal exercise  Take all medications as prescribed  Continue omeprazole, GERD is well controlled  He is compliant with CPAP every night  Currently in normal sinus rhythm, continue Eliquis and follow-up with cardiology as scheduled  Refer to GI for screening colonoscopy as it has been 5 years  Flu vaccine provided today    Return in about 6 months (around 6/20/2023) for  Medicare Wellness.      Marilyn K. Vermeesch, MD      Please note that portions of this note were completed with a voice recognition program.  Efforts were made to edit dictation, but occasionally words are mistranscribed.

## 2022-12-28 RX ORDER — AMLODIPINE BESYLATE 5 MG/1
5 TABLET ORAL DAILY
Qty: 90 TABLET | Refills: 1 | Status: SHIPPED | OUTPATIENT
Start: 2022-12-28

## 2023-02-02 ENCOUNTER — OFFICE VISIT (OUTPATIENT)
Dept: UROLOGY | Facility: CLINIC | Age: 73
End: 2023-02-02
Payer: MEDICARE

## 2023-02-02 VITALS
SYSTOLIC BLOOD PRESSURE: 120 MMHG | HEART RATE: 83 BPM | TEMPERATURE: 97.8 F | OXYGEN SATURATION: 99 % | DIASTOLIC BLOOD PRESSURE: 70 MMHG | RESPIRATION RATE: 12 BRPM

## 2023-02-02 DIAGNOSIS — R31.29 MICROSCOPIC HEMATURIA: Primary | ICD-10-CM

## 2023-02-02 DIAGNOSIS — R97.20 ELEVATED PROSTATE SPECIFIC ANTIGEN (PSA): ICD-10-CM

## 2023-02-02 DIAGNOSIS — N52.9 ERECTILE DYSFUNCTION, UNSPECIFIED ERECTILE DYSFUNCTION TYPE: ICD-10-CM

## 2023-02-02 PROCEDURE — 81003 URINALYSIS AUTO W/O SCOPE: CPT | Performed by: PHYSICIAN ASSISTANT

## 2023-02-02 PROCEDURE — 99214 OFFICE O/P EST MOD 30 MIN: CPT | Performed by: PHYSICIAN ASSISTANT

## 2023-02-02 RX ORDER — POLYETHYLENE GLYCOL-3350 AND ELECTROLYTES 236; 6.74; 5.86; 2.97; 22.74 G/274.31G; G/274.31G; G/274.31G; G/274.31G; G/274.31G
POWDER, FOR SOLUTION ORAL
COMMUNITY
Start: 2023-01-20

## 2023-02-02 RX ORDER — SILDENAFIL 100 MG/1
100 TABLET, FILM COATED ORAL DAILY PRN
Qty: 30 TABLET | Refills: 11 | Status: SHIPPED | OUTPATIENT
Start: 2023-02-02

## 2023-02-02 RX ORDER — METOCLOPRAMIDE 5 MG/1
TABLET ORAL
COMMUNITY
Start: 2023-01-20

## 2023-02-02 NOTE — PROGRESS NOTES
Chief Complaint   Patient presents with   • Elevated PSA   • Blood in Urine   • Follow-up        HPI  Mr. Arcos is a 72 y.o. male with history of elevated PSA and ED who presents for follow up.     At this visit, continues to deny any bothersome LUTS. No new symptoms in the past 6 months.     Past Medical History:   Diagnosis Date   • Arrhythmia    • Arthritis    • Atrial fibrillation (HCC)    • Coronary artery disease    • Full dentures    • GERD (gastroesophageal reflux disease)    • History of prediabetes    • Hyperlipidemia    • Hypertension    • LAUREL on CPAP    • Sleep apnea    • Vitamin D deficiency    • Wears glasses        Past Surgical History:   Procedure Laterality Date   • ANKLE SURGERY     • CARDIAC ELECTROPHYSIOLOGY PROCEDURE N/A 04/27/2022    Procedure: PVA (persistent), hold Tikosyn x 5 days;  Surgeon: William Hargrove MD;  Location: [facility] INVAS[facility] LOC[facility];  Service: Cardiovascular;  Laterality: N/A;   • COLONOSCOPY      Complete   • CYSTOSCOPY  Do not now   • POLYPECTOMY     • STOMACH SURGERY           Current Outpatient Medications:   •  amLODIPine (NORVASC) 5 MG tablet, Take 1 tablet by mouth Daily., Disp: 90 tablet, Rfl: 1  •  apixaban (Eliquis) 5 MG tablet tablet, Take 1 tablet by mouth 2 (Two) Times a Day., Disp: 360 tablet, Rfl: 3  •  atorvastatin (LIPITOR) 20 MG tablet, TAKE 1 TABLET DAILY, Disp: 90 tablet, Rfl: 3  •  Cholecalciferol (VITAMIN D-3) 1000 UNITS capsule, Take 1,000 Units by mouth Daily., Disp: , Rfl:   •  ipratropium (ATROVENT) 0.03 % nasal spray, INSTILL 2 SPRAYS INTO THE NOSTRIL(S) AS DIRECTED BY PROVIDER EVERY 12 (TWELVE) HOURS., Disp: 90 each, Rfl: 1  •  lisinopril (PRINIVIL,ZESTRIL) 40 MG tablet, Take 1 tablet by mouth Daily., Disp: 90 tablet, Rfl: 1  •  Misc Natural Products (OSTEO BI-FLEX JOINT SHIELD PO), Take 1 tablet by mouth 2 (Two) Times a Day., Disp: , Rfl:   •  omeprazole (priLOSEC) 20 MG capsule, Take 1 capsule by mouth Daily., Disp: 90 capsule, Rfl: 1  •   sildenafil (VIAGRA) 100 MG tablet, TAKE 1 TABLET DAILY AS     NEEDED FOR ERECTILE        DYSFUNCTION, Disp: 25 tablet, Rfl: 1  •  GaviLyte-G 236 g solution, USE AS DIRECTED PER COLONOSCOPY PREP INSTRUCTIONS SENT BY PHYSICIAN., Disp: , Rfl:   •  metoclopramide (REGLAN) 5 MG tablet, TAKE AS DIRECTED PER COLONOSCOPY PREP INSTRUCTIONS SENT BY PHYSICIAN., Disp: , Rfl:      Physical Exam  Visit Vitals  /70 (BP Location: Right arm, Patient Position: Sitting, Cuff Size: Adult)   Pulse 83   Temp 97.8 °F (36.6 °C) (Temporal)   Resp 12   SpO2 99%       Labs  Brief Urine Lab Results  (Last result in the past 365 days)      Color   Clarity   Blood   Leuk Est   Nitrite   Protein   CREAT   Urine HCG        07/26/22 1530 Yellow  Comment: REFERENCE RANGE: Yellow, Straw   Clear   Trace   Negative   Negative   Negative                 Lab Results   Component Value Date    GLUCOSE 108 (H) 06/20/2022    CALCIUM 9.4 06/20/2022     06/20/2022    K 4.6 06/20/2022    CO2 23 06/20/2022    CL 97 06/20/2022    BUN 17 06/20/2022    CREATININE 0.81 06/20/2022    EGFRIFAFRI 117 06/17/2021    EGFRIFNONA 96 06/17/2021    BCR 21 06/20/2022    ANIONGAP 10.0 04/25/2022       Lab Results   Component Value Date    WBC 5.9 06/20/2022    HGB 13.4 06/20/2022    HCT 41.1 06/20/2022    MCV 88 06/20/2022     06/20/2022            Lab Results   Component Value Date    PSA 3.5 07/27/2022    PSA 4.1 (H) 06/20/2022    PSA 3.710 06/17/2021         Assessment  72 y.o. male with hx of elevated PSA and ED.    His repeat PSA at our first visit was back within normal range at 3.5. He is due for repeat PSA today and tells me that he remains motivated to detect a possible prostate cancer as early as possible. If his PSA exceeds 4 again, he is ready for next steps.    He otherwise tells me that sildenafil works well for him and he would like to continue it.  Finally, his urine dip reveals an increasing amount of microscopic hematuria.  He was previously  found to have only 0-2 RBCs and I will send it for microscopy today to ensure that this is not increasing as this would warrant individual work-up.    Plan  1.  Obtain repeat PSA today and plan for ongoing surveillance every 6 months  2.  Continue sildenafil 100 mg as needed  3. Obtain urine micro today

## 2023-02-03 LAB
APPEARANCE UR: CLEAR
BACTERIA #/AREA URNS HPF: ABNORMAL /HPF
BILIRUB UR QL STRIP: NEGATIVE
CASTS URNS MICRO: ABNORMAL
COLOR UR: YELLOW
EPI CELLS #/AREA URNS HPF: ABNORMAL /HPF
GLUCOSE UR QL STRIP: NEGATIVE
HGB UR QL STRIP: ABNORMAL
KETONES UR QL STRIP: NEGATIVE
LEUKOCYTE ESTERASE UR QL STRIP: NEGATIVE
NITRITE UR QL STRIP: NEGATIVE
PH UR STRIP: 7.5 [PH] (ref 5–8)
PROT UR QL STRIP: NEGATIVE
RBC #/AREA URNS HPF: ABNORMAL /HPF
SP GR UR STRIP: 1.02 (ref 1–1.03)
UROBILINOGEN UR STRIP-MCNC: ABNORMAL MG/DL
WBC #/AREA URNS HPF: ABNORMAL /HPF

## 2023-02-08 ENCOUNTER — TELEPHONE (OUTPATIENT)
Dept: UROLOGY | Facility: CLINIC | Age: 73
End: 2023-02-08
Payer: MEDICARE

## 2023-02-08 NOTE — TELEPHONE ENCOUNTER
----- Message from Uzma Arcos PA-C sent at 2/6/2023  5:25 PM EST -----  Regarding: Telephone appt with me  Hello,    Please reach out to Mr. Arcos to set up a time that he and I can talk about his PSA. It is over 4 again and he has told me in the past that he would want to take the next steps (biopsy) if needed.    Telephone or in person is fine.     ----- Message -----  From: [facility] Results In  Sent: 2/4/2023   4:35 PM EST  To: Uzma Arcos PA-C

## 2023-02-08 NOTE — TELEPHONE ENCOUNTER
I lvm for patient to discuss repeat PSA and need follow-up appointment for care plan.     Yesy HUGHES MA

## 2023-02-09 RX ORDER — AMLODIPINE BESYLATE 5 MG/1
TABLET ORAL
Qty: 90 TABLET | Refills: 1 | OUTPATIENT
Start: 2023-02-09

## 2023-02-10 ENCOUNTER — OFFICE VISIT (OUTPATIENT)
Dept: UROLOGY | Facility: CLINIC | Age: 73
End: 2023-02-10
Payer: MEDICARE

## 2023-02-10 VITALS
BODY MASS INDEX: 35.07 KG/M2 | WEIGHT: 245 LBS | TEMPERATURE: 97.4 F | HEART RATE: 80 BPM | HEIGHT: 70 IN | OXYGEN SATURATION: 95 % | SYSTOLIC BLOOD PRESSURE: 170 MMHG | DIASTOLIC BLOOD PRESSURE: 82 MMHG

## 2023-02-10 DIAGNOSIS — R97.20 ELEVATED PROSTATE SPECIFIC ANTIGEN (PSA): Primary | ICD-10-CM

## 2023-02-10 PROCEDURE — 99214 OFFICE O/P EST MOD 30 MIN: CPT | Performed by: PHYSICIAN ASSISTANT

## 2023-02-10 RX ORDER — CIPROFLOXACIN 500 MG/1
500 TABLET, FILM COATED ORAL 2 TIMES DAILY
Qty: 6 TABLET | Refills: 0 | Status: SHIPPED | OUTPATIENT
Start: 2023-02-10

## 2023-02-10 RX ORDER — CEPHALEXIN 500 MG/1
500 CAPSULE ORAL 2 TIMES DAILY
Qty: 6 CAPSULE | Refills: 0 | Status: SHIPPED | OUTPATIENT
Start: 2023-02-10 | End: 2023-02-13

## 2023-02-10 RX ORDER — MAGNESIUM HYDROXIDE 1200 MG/15ML
1 LIQUID ORAL ONCE
Qty: 1 ENEMA | Refills: 0 | Status: SHIPPED | OUTPATIENT
Start: 2023-02-10 | End: 2023-02-10

## 2023-02-10 NOTE — PROGRESS NOTES
Chief Complaint   Patient presents with   • Follow-up     PSA results and treatment plan        HPI  Mr. Arcos is a 72 y.o. male with history of elevated PSA and ED who presents for follow up.     At this visit, is aware that his PSA is above 4 again and ready to discuss prostate biopsy.     Past Medical History:   Diagnosis Date   • Arrhythmia    • Arthritis    • Atrial fibrillation (HCC)    • Coronary artery disease    • Full dentures    • GERD (gastroesophageal reflux disease)    • History of prediabetes    • Hyperlipidemia    • Hypertension    • LAUREL on CPAP    • Sleep apnea    • Vitamin D deficiency    • Wears glasses        Past Surgical History:   Procedure Laterality Date   • ANKLE SURGERY     • CARDIAC ELECTROPHYSIOLOGY PROCEDURE N/A 04/27/2022    Procedure: PVA (persistent), hold Tikosyn x 5 days;  Surgeon: William Hargrove MD;  Location: [facility] INVASIVE LOCATION;  Service: Cardiovascular;  Laterality: N/A;   • COLONOSCOPY      Complete   • CYSTOSCOPY  Do not now   • POLYPECTOMY     • STOMACH SURGERY           Current Outpatient Medications:   •  amLODIPine (NORVASC) 5 MG tablet, Take 1 tablet by mouth Daily., Disp: 90 tablet, Rfl: 1  •  apixaban (Eliquis) 5 MG tablet tablet, Take 1 tablet by mouth 2 (Two) Times a Day., Disp: 360 tablet, Rfl: 3  •  atorvastatin (LIPITOR) 20 MG tablet, TAKE 1 TABLET DAILY, Disp: 90 tablet, Rfl: 3  •  Cholecalciferol (VITAMIN D-3) 1000 UNITS capsule, Take 1,000 Units by mouth Daily., Disp: , Rfl:   •  GaviLyte-G 236 g solution, USE AS DIRECTED PER COLONOSCOPY PREP INSTRUCTIONS SENT BY PHYSICIAN., Disp: , Rfl:   •  ipratropium (ATROVENT) 0.03 % nasal spray, INSTILL 2 SPRAYS INTO THE NOSTRIL(S) AS DIRECTED BY PROVIDER EVERY 12 (TWELVE) HOURS., Disp: 90 each, Rfl: 1  •  lisinopril (PRINIVIL,ZESTRIL) 40 MG tablet, Take 1 tablet by mouth Daily., Disp: 90 tablet, Rfl: 1  •  metoclopramide (REGLAN) 5 MG tablet, TAKE AS DIRECTED PER COLONOSCOPY PREP INSTRUCTIONS SENT BY PHYSICIAN.,  "Disp: , Rfl:   •  Misc Natural Products (OSTEO BI-FLEX JOINT SHIELD PO), Take 1 tablet by mouth 2 (Two) Times a Day., Disp: , Rfl:   •  omeprazole (priLOSEC) 20 MG capsule, Take 1 capsule by mouth Daily., Disp: 90 capsule, Rfl: 1  •  sildenafil (VIAGRA) 100 MG tablet, Take 1 tablet by mouth Daily As Needed for Erectile Dysfunction., Disp: 30 tablet, Rfl: 11     Physical Exam  Visit Vitals  /82 (BP Location: Left arm, Patient Position: Sitting, Cuff Size: Adult) Comment: Only took BP meds 1 hr prior   Pulse 80   Temp 97.4 °F (36.3 °C)   Ht 177.8 cm (70\")   Wt 111 kg (245 lb)   SpO2 95%   BMI 35.15 kg/m²       Labs  Brief Urine Lab Results  (Last result in the past 365 days)      Color   Clarity   Blood   Leuk Est   Nitrite   Protein   CREAT   Urine HCG        02/02/23 1417 Yellow  Comment: REFERENCE RANGE: Yellow, Straw   Clear   Trace   Negative   Negative   Negative                 Lab Results   Component Value Date    GLUCOSE 108 (H) 06/20/2022    CALCIUM 9.4 06/20/2022     06/20/2022    K 4.6 06/20/2022    CO2 23 06/20/2022    CL 97 06/20/2022    BUN 17 06/20/2022    CREATININE 0.81 06/20/2022    EGFRIFAFRI 117 06/17/2021    EGFRIFNONA 96 06/17/2021    BCR 21 06/20/2022    ANIONGAP 10.0 04/25/2022       Lab Results   Component Value Date    WBC 5.9 06/20/2022    HGB 13.4 06/20/2022    HCT 41.1 06/20/2022    MCV 88 06/20/2022     06/20/2022            Lab Results   Component Value Date    PSA 4.4 (H) 02/03/2023    PSA 3.5 07/27/2022    PSA 4.1 (H) 06/20/2022       Assessment  72 y.o. male with elevated PSA and ED.    His PSA has again exceeded 4.0, now at 4.4. He remains motivated to detect a prostate cancer as early as possible and is aware that prostate biopsy is the next step for diagnosis.  We discussed the risks and benefits of a prostate biopsy.  He has a history of atrial fibrillation and has undergone ablation.  His [id number] is 2.  We would recommend holding Eliquis for 3 days prior " to prostate biopsy and resuming once his stool and urine are without gross blood.      Plan  1.  Patient is consented for prostate biopsy  2.  Antibiotics and fleets enema are prescribed in preparation for biopsy  3.  Eliquis will need to be held 3 days prior to biopsy and resumed as above

## 2023-02-15 DIAGNOSIS — R97.20 ELEVATED PROSTATE SPECIFIC ANTIGEN (PSA): Primary | ICD-10-CM

## 2023-02-20 ENCOUNTER — TELEPHONE (OUTPATIENT)
Dept: CARDIOLOGY | Facility: CLINIC | Age: 73
End: 2023-02-20
Payer: MEDICARE

## 2023-02-28 ENCOUNTER — TELEPHONE (OUTPATIENT)
Dept: INTERNAL MEDICINE | Facility: CLINIC | Age: 73
End: 2023-02-28
Payer: MEDICARE

## 2023-02-28 DIAGNOSIS — N40.0 BPH WITH ELEVATED PSA: Primary | ICD-10-CM

## 2023-02-28 DIAGNOSIS — R97.20 BPH WITH ELEVATED PSA: Primary | ICD-10-CM

## 2023-02-28 NOTE — TELEPHONE ENCOUNTER
Pt currently sees a urologist but is requesting to see someone else at [facility].

## 2023-02-28 NOTE — TELEPHONE ENCOUNTER
Patient stopped by the office today requesting for Dr. Vermeesch to send in a Urology Referral to [facility]. He does not want to see who he is currently with in Urology.     He has had an abnormal PSA and is concerned about having prostate cancer and would like to be seen in [place].     Patient works 3rd shift so he would like for us to contact him on his home phone at [phone] if needed.

## 2023-03-16 RX ORDER — AMLODIPINE BESYLATE 5 MG/1
TABLET ORAL
Qty: 90 TABLET | Refills: 1 | OUTPATIENT
Start: 2023-03-16

## 2023-03-23 ENCOUNTER — OFFICE VISIT (OUTPATIENT)
Dept: UROLOGY | Facility: CLINIC | Age: 73
End: 2023-03-23
Payer: MEDICARE

## 2023-03-23 VITALS — BODY MASS INDEX: 35.15 KG/M2 | HEIGHT: 70 IN

## 2023-03-23 DIAGNOSIS — R97.20 ELEVATED PROSTATE SPECIFIC ANTIGEN (PSA): ICD-10-CM

## 2023-03-23 DIAGNOSIS — R97.20 BPH WITH ELEVATED PSA: Primary | ICD-10-CM

## 2023-03-23 DIAGNOSIS — R31.29 MICROSCOPIC HEMATURIA: ICD-10-CM

## 2023-03-23 DIAGNOSIS — N40.0 BPH WITH ELEVATED PSA: Primary | ICD-10-CM

## 2023-03-23 LAB
BACTERIA UR QL AUTO: NORMAL /HPF
BILIRUB BLD-MCNC: NEGATIVE MG/DL
BILIRUB UR QL STRIP: NEGATIVE
CLARITY UR: CLEAR
CLARITY, POC: CLEAR
COLOR UR: YELLOW
COLOR UR: YELLOW
EXPIRATION DATE: ABNORMAL
GLUCOSE UR STRIP-MCNC: NEGATIVE MG/DL
GLUCOSE UR STRIP-MCNC: NEGATIVE MG/DL
HGB UR QL STRIP.AUTO: NEGATIVE
HYALINE CASTS UR QL AUTO: NORMAL /LPF
KETONES UR QL STRIP: NEGATIVE
KETONES UR QL: NEGATIVE
LEUKOCYTE EST, POC: NEGATIVE
LEUKOCYTE ESTERASE UR QL STRIP.AUTO: NEGATIVE
Lab: ABNORMAL
NITRITE UR QL STRIP: NEGATIVE
NITRITE UR-MCNC: NEGATIVE MG/ML
PH UR STRIP.AUTO: 7 [PH] (ref 5–8)
PH UR: 7 [PH] (ref 5–8)
PROT UR QL STRIP: NEGATIVE
PROT UR STRIP-MCNC: NEGATIVE MG/DL
RBC # UR STRIP: ABNORMAL /UL
RBC # UR STRIP: NORMAL /HPF
REF LAB TEST METHOD: NORMAL
SP GR UR STRIP: 1.01 (ref 1–1.03)
SP GR UR: 1.01 (ref 1–1.03)
SQUAMOUS #/AREA URNS HPF: NORMAL /HPF
UROBILINOGEN UR QL STRIP: NORMAL
UROBILINOGEN UR QL: NORMAL
WBC # UR STRIP: NORMAL /HPF

## 2023-03-23 PROCEDURE — 99214 OFFICE O/P EST MOD 30 MIN: CPT | Performed by: STUDENT IN AN ORGANIZED HEALTH CARE EDUCATION/TRAINING PROGRAM

## 2023-03-23 PROCEDURE — 51798 US URINE CAPACITY MEASURE: CPT | Performed by: STUDENT IN AN ORGANIZED HEALTH CARE EDUCATION/TRAINING PROGRAM

## 2023-03-23 PROCEDURE — 1159F MED LIST DOCD IN RCRD: CPT | Performed by: STUDENT IN AN ORGANIZED HEALTH CARE EDUCATION/TRAINING PROGRAM

## 2023-03-23 PROCEDURE — 1160F RVW MEDS BY RX/DR IN RCRD: CPT | Performed by: STUDENT IN AN ORGANIZED HEALTH CARE EDUCATION/TRAINING PROGRAM

## 2023-03-23 PROCEDURE — 81003 URINALYSIS AUTO W/O SCOPE: CPT | Performed by: STUDENT IN AN ORGANIZED HEALTH CARE EDUCATION/TRAINING PROGRAM

## 2023-03-23 PROCEDURE — 81001 URINALYSIS AUTO W/SCOPE: CPT | Performed by: STUDENT IN AN ORGANIZED HEALTH CARE EDUCATION/TRAINING PROGRAM

## 2023-03-23 NOTE — PROGRESS NOTES
Elevated PSA Office Visit      Patient Name: Erik Arcos  : 1950   MRN: [id number]     Chief Complaint:  Elevated PSA.    Chief Complaint   Patient presents with   • Elevated PSA       Referring Provider: Vermeesch, Marilyn K, MD    History of Present Illness: Erik Arcos is a 72 y.o. male who presents today with history of elevated PSA. He has a history of CAD, afib, GERD, pre-diabetes, HLD, HTN, LAUREL on CPAP.     Denies a family history of prostate cancer.  Patient has been following with a physicians assistant and [facility] urology.  Based on his PSA history he was recommended to undergo prostate biopsy.  He states he does not believe anyone has performed a digital rectal examination on him.  Denies a family history of prostate cancer.    Lab Results   Component Value Date    PSA 4.4 (H) 2023    PSA 3.5 2022    PSA 4.1 (H) 2022      He is scheduled for MRI prostate this weekend actually, this was ordered by the physicians assistant in [facility].    Deneis gross hematuria but has persistent micro UA positive for blood.  I will send a microscopic urinalysis today.  He is a former smoker.      Subjective      Review of System: Review of Systems   Genitourinary: Positive for frequency.      I have reviewed the ROS documented by my clinical staff, I have updated appropriately and I agree. Chi Berger MD    Past Medical History:   Past Medical History:   Diagnosis Date   • Arrhythmia    • Arthritis    • Atrial fibrillation (HCC)    • Coronary artery disease    • Full dentures    • GERD (gastroesophageal reflux disease)    • History of prediabetes    • Hyperlipidemia    • Hypertension    • LAUREL on CPAP    • Sleep apnea    • Vitamin D deficiency    • Wears glasses        Past Surgical History:   Past Surgical History:   Procedure Laterality Date   • ANKLE SURGERY     • CARDIAC ELECTROPHYSIOLOGY PROCEDURE N/A 2022    Procedure: PVA (persistent),  hold Tikosyn x 5 days;  Surgeon: William Hargrove MD;  Location: [facility] INVASIVE LOCATION;  Service: Cardiovascular;  Laterality: N/A;   • COLONOSCOPY      Complete   • CYSTOSCOPY  Do not now   • POLYPECTOMY     • STOMACH SURGERY         Family History:   Family History   Problem Relation Age of Onset   • Cancer Mother    • No Known Problems Father    • No Known Problems Sister    • Diabetes Half-Sister    • No Known Problems Half-Brother        Social History:   Social History     Socioeconomic History   • Marital status:    Tobacco Use   • Smoking status: Former     Packs/day: 2.50     Years: 15.00     Pack years: 37.50     Types: Cigarettes     Quit date: 1987     Years since quittin.0   • Smokeless tobacco: Never   Vaping Use   • Vaping Use: Never used   Substance and Sexual Activity   • Alcohol use: No   • Drug use: No   • Sexual activity: Defer       Medications:     Current Outpatient Medications:   •  amLODIPine (NORVASC) 5 MG tablet, Take 1 tablet by mouth Daily., Disp: 90 tablet, Rfl: 1  •  apixaban (Eliquis) 5 MG tablet tablet, Take 1 tablet by mouth 2 (Two) Times a Day., Disp: 360 tablet, Rfl: 3  •  atorvastatin (LIPITOR) 20 MG tablet, TAKE 1 TABLET DAILY, Disp: 90 tablet, Rfl: 3  •  Cholecalciferol (VITAMIN D-3) 1000 UNITS capsule, Take 1,000 Units by mouth Daily., Disp: , Rfl:   •  ciprofloxacin (Cipro) 500 MG tablet, Take 1 tablet by mouth 2 (Two) Times a Day. Start taking the day prior to biopsy, Disp: 6 tablet, Rfl: 0  •  GaviLyte-G 236 g solution, USE AS DIRECTED PER COLONOSCOPY PREP INSTRUCTIONS SENT BY PHYSICIAN., Disp: , Rfl:   •  ipratropium (ATROVENT) 0.03 % nasal spray, INSTILL 2 SPRAYS INTO THE NOSTRIL(S) AS DIRECTED BY PROVIDER EVERY 12 (TWELVE) HOURS., Disp: 90 each, Rfl: 1  •  lisinopril (PRINIVIL,ZESTRIL) 40 MG tablet, Take 1 tablet by mouth Daily., Disp: 90 tablet, Rfl: 1  •  metoclopramide (REGLAN) 5 MG tablet, TAKE AS DIRECTED PER COLONOSCOPY PREP INSTRUCTIONS SENT BY  PHYSICIAN., Disp: , Rfl:   •  Misc Natural Products (OSTEO BI-FLEX JOINT SHIELD PO), Take 1 tablet by mouth 2 (Two) Times a Day., Disp: , Rfl:   •  omeprazole (priLOSEC) 20 MG capsule, Take 1 capsule by mouth Daily., Disp: 90 capsule, Rfl: 1  •  sildenafil (VIAGRA) 100 MG tablet, Take 1 tablet by mouth Daily As Needed for Erectile Dysfunction., Disp: 30 tablet, Rfl: 11    Allergies:   No Known Allergies    IPSS Questionnaire (AUA-7):  Over the past month…    1)  Incomplete Emptying:       How often have you had a sensation of not emptying you had the sensation of not emptying your bladder completely after you finished urinating?  1 - Less than 1 time in 5   2)  Frequency:       How often have you had the urinate again less than two hours after you finished urinating?  1 - Less than 1 time in 5   3)  Intermittency:       How often have you found you stopped and started again several times when you urinated?   2 - Less than half the time   4) Urgency:      How often have you found it difficult to postpone urination?  3 - About half the time   5) Weak Stream:      How often have you had a weak urinary stream?  0 - Not at all   6) Straining:       How often have you had to push or strain to begin urination?  0 - Not at all   7) Nocturia:      How many times did you most typically get up to urinate from the time you went to bed at night until the time you got up in the morning?  1 - 1 time   Total Score:  8   The International Prostate Symptom Score (IPSS) is used to screen, diagnose, track symptoms of benign prostatic hyperplasia (BPH).   0-7 (Mild Symptoms) 8-19 (Moderate) 20-35 (Severe)   Quality of Life (QoL):  If you were to spend the rest of your life with your urinary condition just the way it is now, how would you feel about that? 5-Unhappy   Urine Leakage (Incontinence) 0-No Leakage       Sexual Health Inventory for Men (MASON)   Over the past 6 months:     1. How do you rate your confidence that you could get  "and keep an erection?  3 - Moderate   2. When you had erections with sexual    stimulation, how often were your erections hard enough for penetration (entering your partner)?  2 - A few times (much less than half the time)   3. During sexual intercourse, how often were you able to maintain your erection after you had penetrated (entered) your partner?  2 - A few times (much less than half the time)   4. During sexual intercourse, how difficult was it to maintain your erection to completion of intercourse?  3 - Difficult    5. When you attempted sexual intercourse, how often was it satisfactory for you?  3 - Sometimes (About half the time)     Total Score: 13   The Sexual Health Inventory for Men further classifies ED severity with the following breakpoints:   1-7 (Severe ED) 8-11 (Moderate ED) 12-16 (Mild to Moderate ED) 17-21 (Mild ED)      Post void residual bladder scan:   43 mL       Objective     Physical Exam:   Vital Signs:   Vitals:    03/23/23 1339   Height: 177.8 cm (70\")     Body mass index is 35.15 kg/m².     Physical Exam  Constitutional:       Appearance: Normal appearance.   HENT:      Head: Normocephalic and atraumatic.      Nose: Nose normal.   Eyes:      Extraocular Movements: Extraocular movements intact.      Conjunctiva/sclera: Conjunctivae normal.      Pupils: Pupils are equal, round, and reactive to light.   Genitourinary:     Comments: Circumcised phallus, orthotopic meatus, bilaterally descended testicles without masses.  TONY demonstrates normal tone, no blood, estimated 50 g prostate with no nodules or induration.  Musculoskeletal:         General: Normal range of motion.      Cervical back: Normal range of motion and neck supple.   Skin:     General: Skin is warm and dry.      Findings: No lesion or rash.   Neurological:      General: No focal deficit present.      Mental Status: He is alert and oriented to person, place, and time. Mental status is at baseline.   Psychiatric:         Mood " and Affect: Mood normal.         Behavior: Behavior normal.         Labs:   Hematocrit (%)   Date Value   06/20/2022 41.1   04/25/2022 41.0   06/17/2021 44.0   06/16/2020 42.3   05/21/2019 44.8   05/16/2018 43.2   08/30/2016 43.4   01/29/2015 42.0       Lab Results   Component Value Date    PSA 4.4 (H) 02/03/2023    PSA 3.5 07/27/2022    PSA 4.1 (H) 06/20/2022       Images:   No Images in the past 120 days found..    Measures:   Tobacco:   Erik Arcos  reports that he quit smoking about 36 years ago. His smoking use included cigarettes. He has a 37.50 pack-year smoking history. He has never used smokeless tobacco.      Assessment / Plan      Assessment/Plan:   Mr. Arcos is a 72 y.o. male with elevated PSA and multiple dipstick urinalysis positive for blood, concerning for possible microscopic hematuria.    I had a detailed discussion with the patient today regarding prostate-specific antigen (PSA) and prostate cancer screening.  We discussed that PSA is an imperfect screening test and that it can be elevated for a variety of reasons including infection, inflammation, as a function of the prostate volume, and due to malignancy.  We discussed how prostate cancer is the most commonly diagnosed malignancy among men and becomes more prevalent at advanced age.  We discussed how patients with a family history of prostate cancer are at an increased risk of developing prostate cancer over the general population.  I counseled the patient that the American Urological Association guidelines recommend PSA screening in men aged 55 through 70, or in some instances at an earlier age if positive family history for prostate cancer.  I discussed how screening men older than 70 years old is not recommended, unless a patient has a greater than 10-year life expectancy, is in good health, and is personally motivated to rule out prostate cancer; this is due to the fact that most men older than 70, in poor health, and in those men  "with less than 10-year life expectancy will likely die of unrelated causes not associated with prostate cancer.  We talked about how prostate cancer is typically a slow-growing malignancy, but identifying intermediate or high risk prostate cancers that need prompt treatment is the ultimate goal of PSA and prostate cancer screening.  I discussed with this patient that there is no \"normal\" PSA range despite the fact that most labs indicate a \"normal\" PSA range from 0 to 4 ng/dL.  There are age-adjusted PSA ranges that are also taken into account in the setting of slightly elevated PSA in the older male.  Besides age-adjusted ranges, calculating a patient's prostate volume to determine PSA density (<0.15 has a lower risk of harboring malignancy), calculating the patient's PSA velocity or doubling time, and evaluating free PSA versus total PSA values can help guide our decision making.  I also discussed the possibility of performing adjunctive blood tests as well, my preference is to perform a 4K score which can provide a percentage risk of harboring intermediate or high risk prostate cancers that may need treatment.  This is sometimes beneficial in assisting with decision-making in patients who are hesitant to undergo prostate biopsy.  I also discussed my goal is to work-up the appropriate patient for elevated PSA as prostate biopsy and work-up for prostate cancer has inherent risks and potential harms.  The risk of prostate biopsy related sepsis is 4% for all comers.    In short, I discussed that PSA screening and work-up for prostate cancer should only ever occur after shared decision making conversation between the physician and patient.  Patient reports understanding.    Discussion summary  Scheduled for MRI prostate for further risk characterization he will follow-up next week to review results and imaging.  He has total dipstick urinalysis positive for blood.  I will send a microscopic urinalysis to confirm " presence of blood.  If present given his smoking history and age he will need a CT urogram and flexible cystoscopy for further evaluation according to AUA guidelines.    Diagnoses and all orders for this visit:    1. Elevated prostate specific antigen (PSA)  -Follow-up next week to review MRI results, if concerning PI-RADS 3, 4, or 5 lesions he will require cognitive fusion targeted biopsy    -     POC Urinalysis Dipstick, Automated    2. Microscopic hematuria  -     Urinalysis With Microscopic - Urine, Clean Catch; Future  -     Cancel: Urinalysis With Microscopic - Urine, Clean Catch  -     Urinalysis With Microscopic - Urine, Clean Catch               Follow Up:   Return in about 5 days (around 3/28/2023) for 2-3 PM on Tuesday for follow up to review prostate MRI .    I spent approximately 30 minutes providing clinical care for this patient; including review of patient's chart and provider documentation, face to face time spent with patient in examination room (obtaining history, performing physical exam, discussing diagnosis and management options), placing orders, and completing patient documentation.     Chi Berger MD  [facility] [place] [facility] [place]

## 2023-03-24 NOTE — PROGRESS NOTES
Please let patient know his microscopic urinalysis demonstrates no blood, therefore at this time he does not need any further work-up with cystoscopy, we are awaiting MRI results regarding elevated PSA.  Next we can follow-up and discuss this as well.    Chi Berger MD

## 2023-03-26 ENCOUNTER — HOSPITAL ENCOUNTER (OUTPATIENT)
Dept: MRI IMAGING | Facility: HOSPITAL | Age: 73
Discharge: HOME | End: 2023-03-26
Admitting: PHYSICIAN ASSISTANT
Payer: MEDICARE

## 2023-03-26 DIAGNOSIS — R97.20 ELEVATED PROSTATE SPECIFIC ANTIGEN (PSA): ICD-10-CM

## 2023-03-26 PROCEDURE — 72197 MRI PELVIS W/O & W/DYE: CPT

## 2023-03-26 PROCEDURE — A9577 INJ MULTIHANCE: HCPCS | Performed by: PHYSICIAN ASSISTANT

## 2023-03-26 PROCEDURE — 0 GADOBENATE DIMEGLUMINE 529 MG/ML SOLUTION: Performed by: PHYSICIAN ASSISTANT

## 2023-03-26 PROCEDURE — 82565 ASSAY OF CREATININE: CPT

## 2023-03-26 RX ADMIN — GADOBENATE DIMEGLUMINE 20 ML: 529 INJECTION, SOLUTION INTRAVENOUS at 18:02

## 2023-03-27 ENCOUNTER — TELEPHONE (OUTPATIENT)
Dept: UROLOGY | Facility: CLINIC | Age: 73
End: 2023-03-27
Payer: MEDICARE

## 2023-03-27 NOTE — TELEPHONE ENCOUNTER
Called pt and spoke to wife about results, she verbalized understanding. Pt was suppose to have an appointment 3/28 but his MI results are not back yet. I told pt wife that they should schedule out just a couple days so that gives the MRI enough time to be resulted. She verbalized understanding to that as well and stated she would have him call us later.

## 2023-03-27 NOTE — TELEPHONE ENCOUNTER
----- Message from Chi Berger MD sent at 3/24/2023  5:07 PM EDT -----  Please let patient know his microscopic urinalysis demonstrates no blood, therefore at this time he does not need any further work-up with cystoscopy, we are awaiting MRI results regarding elevated PSA.  Next we can follow-up and discuss this as well.    Chi Berger MD

## 2023-03-28 ENCOUNTER — OFFICE VISIT (OUTPATIENT)
Dept: UROLOGY | Facility: CLINIC | Age: 73
End: 2023-03-28
Payer: MEDICARE

## 2023-03-28 VITALS — HEIGHT: 70 IN | BODY MASS INDEX: 36.08 KG/M2 | WEIGHT: 252 LBS

## 2023-03-28 DIAGNOSIS — R97.20 ELEVATED PROSTATE SPECIFIC ANTIGEN (PSA): Primary | ICD-10-CM

## 2023-03-28 PROCEDURE — 99214 OFFICE O/P EST MOD 30 MIN: CPT | Performed by: STUDENT IN AN ORGANIZED HEALTH CARE EDUCATION/TRAINING PROGRAM

## 2023-03-28 PROCEDURE — 1160F RVW MEDS BY RX/DR IN RCRD: CPT | Performed by: STUDENT IN AN ORGANIZED HEALTH CARE EDUCATION/TRAINING PROGRAM

## 2023-03-28 PROCEDURE — 1159F MED LIST DOCD IN RCRD: CPT | Performed by: STUDENT IN AN ORGANIZED HEALTH CARE EDUCATION/TRAINING PROGRAM

## 2023-03-28 NOTE — PROGRESS NOTES
Follow Up Office Visit      Patient Name: Erik Arcos  : 1950   MRN: [id number]     Chief Complaint:    Chief Complaint   Patient presents with   • Benign Prostatic Hypertrophy   • Elevated PSA   • Microscopic Hematuria       Referring Provider: No ref. provider found    History of Present Illness: Erik Arcos is a 72 y.o. male who presents today for follow up of elevated PSA. History of elevated PSA. He has a history of CAD, afib, GERD, pre-diabetes, HLD, HTN, LAUREL on CPAP.     Denies a family history of prostate cancer.  Patient has been following with a physicians assistant and [facility] urology.  Based on his PSA history he was recommended to undergo prostate biopsy.  He states he does not believe anyone has performed a digital rectal examination on him.  Denies a family history of prostate cancer.           Lab Results   Component Value Date     PSA 4.4 (H) 2023     PSA 3.5 2022     PSA 4.1 (H) 2022     Since last evaluated 3/23/23, he completed MRI prostate.     IMPRESSION:  Impression:  1. Left mid gland peripheral zone 7 mm lesion consistent with PI-RADS 4 finding.  2. Right mid gland peripheral zone 10 mm lesion consistent with PI-RADS 4 finding.   3. Lesion on left bulges capsule without clear EPE. Lesion on right abuts capsule without clear EPE.  4. Negative for pelvic lymphadenopathy.     The patient's PSA density is 0.05, relatively nonconcerning.  His prostate volume is enlarged at 70+ cc.     Personally evaluated the patient's MRI imaging today.    There was also some concern about microscopic hematuria at his last visit, and microscopic urinalysis was sent and was negative for any blood.     Subjective      Review of System: Review of Systems   Genitourinary: Negative for decreased urine volume, difficulty urinating, dysuria, enuresis, flank pain, frequency, hematuria and urgency.      I have reviewed the ROS documented by my clinical staff, I  "have updated appropriately and I agree. Chi Berger MD    I have reviewed and the following portions of the patient's history were updated as appropriate: past family history, past medical history, past social history, past surgical history and problem list.    Medications:     Current Outpatient Medications:   •  amLODIPine (NORVASC) 5 MG tablet, Take 1 tablet by mouth Daily., Disp: 90 tablet, Rfl: 1  •  apixaban (Eliquis) 5 MG tablet tablet, Take 1 tablet by mouth 2 (Two) Times a Day., Disp: 360 tablet, Rfl: 3  •  atorvastatin (LIPITOR) 20 MG tablet, TAKE 1 TABLET DAILY, Disp: 90 tablet, Rfl: 3  •  Cholecalciferol (VITAMIN D-3) 1000 UNITS capsule, Take 1,000 Units by mouth Daily., Disp: , Rfl:   •  ciprofloxacin (Cipro) 500 MG tablet, Take 1 tablet by mouth 2 (Two) Times a Day. Start taking the day prior to biopsy, Disp: 6 tablet, Rfl: 0  •  GaviLyte-G 236 g solution, USE AS DIRECTED PER COLONOSCOPY PREP INSTRUCTIONS SENT BY PHYSICIAN., Disp: , Rfl:   •  ipratropium (ATROVENT) 0.03 % nasal spray, INSTILL 2 SPRAYS INTO THE NOSTRIL(S) AS DIRECTED BY PROVIDER EVERY 12 (TWELVE) HOURS., Disp: 90 each, Rfl: 1  •  lisinopril (PRINIVIL,ZESTRIL) 40 MG tablet, Take 1 tablet by mouth Daily., Disp: 90 tablet, Rfl: 1  •  metoclopramide (REGLAN) 5 MG tablet, TAKE AS DIRECTED PER COLONOSCOPY PREP INSTRUCTIONS SENT BY PHYSICIAN., Disp: , Rfl:   •  Misc Natural Products (OSTEO BI-FLEX JOINT SHIELD PO), Take 1 tablet by mouth 2 (Two) Times a Day., Disp: , Rfl:   •  omeprazole (priLOSEC) 20 MG capsule, Take 1 capsule by mouth Daily., Disp: 90 capsule, Rfl: 1  •  sildenafil (VIAGRA) 100 MG tablet, Take 1 tablet by mouth Daily As Needed for Erectile Dysfunction., Disp: 30 tablet, Rfl: 11    Allergies:   No Known Allergies       Objective     Physical Exam:   Vital Signs:   Vitals:    03/28/23 1453   Weight: 114 kg (252 lb)   Height: 177.8 cm (70\")     Body mass index is 36.16 kg/m².     Physical Exam  Constitutional:       " Appearance: Normal appearance.   HENT:      Head: Normocephalic and atraumatic.      Nose: Nose normal.   Eyes:      Extraocular Movements: Extraocular movements intact.      Conjunctiva/sclera: Conjunctivae normal.      Pupils: Pupils are equal, round, and reactive to light.   Musculoskeletal:         General: Normal range of motion.      Cervical back: Normal range of motion and neck supple.   Skin:     General: Skin is warm and dry.      Findings: No lesion or rash.   Neurological:      General: No focal deficit present.      Mental Status: He is alert and oriented to person, place, and time. Mental status is at baseline.   Psychiatric:         Mood and Affect: Mood normal.         Behavior: Behavior normal.         Labs:   Brief Urine Lab Results  (Last result in the past 365 days)      Color   Clarity   Blood   Leuk Est   Nitrite   Protein   CREAT   Urine HCG        03/23/23 1503 Yellow   Clear   Negative   Negative   Negative   Negative                      Lab Results   Component Value Date    GLUCOSE 108 (H) 06/20/2022    CALCIUM 9.4 06/20/2022     06/20/2022    K 4.6 06/20/2022    CO2 23 06/20/2022    CL 97 06/20/2022    BUN 17 06/20/2022    CREATININE 0.81 06/20/2022    EGFRIFAFRI 117 06/17/2021    EGFRIFNONA 96 06/17/2021    BCR 21 06/20/2022    ANIONGAP 10.0 04/25/2022       Lab Results   Component Value Date    WBC 5.9 06/20/2022    HGB 13.4 06/20/2022    HCT 41.1 06/20/2022    MCV 88 06/20/2022     06/20/2022       Images:   MRI Prostate With & Without Contrast    Result Date: 3/27/2023  Impression: 1. Left mid gland peripheral zone 7 mm lesion consistent with PI-RADS 4 finding. 2. Right mid gland peripheral zone 10 mm lesion consistent with PI-RADS 4 finding. 3. Lesion on left bulges capsule without clear EPE. Lesion on right abuts capsule without clear EPE. 4. Negative for pelvic lymphadenopathy. Overall PI-RADS 4 Exam. Electronically Signed: William Sanchez  3/27/2023 11:51 AM EDT   Workstation ID: [id number]      Measures:   Tobacco:   Erik Arcos  reports that he quit smoking about 36 years ago. His smoking use included cigarettes. He has a 37.50 pack-year smoking history. He has never used smokeless tobacco.      Assessment / Plan      Assessment/Plan:   72 y.o. male who presented today for follow up of elevated PSA.  Discussed 2 separate PI-RADS 4 lesions in the right and left mid gland peripheral zone.  This deserves prostate biopsy given risk of undiagnosed prostatic malignancy.  We discussed the risk of prostate biopsy including bleeding in the urine, stool, ejaculate, sepsis in 3% of men.  He was prescribed ciprofloxacin, he will initiate this the day before his biopsy.  He will hold his Eliquis for 72 hours prior to biopsy.  Patient's PSA is still very low, given his enlarged prostate and a nonconcerning PSA density, we discussed that his MRI findings are not at diagnosis of malignancy at this time.  We will see him back on 4/11/2023 for prostate biopsy in clinic, cognitive fusion approach.     Diagnoses and all orders for this visit:    1. Elevated prostate specific antigen (PSA) (Primary)  - MRI prostate with PIRADS 4 lesions in bilateral mid gland peripheral zones  - cognitive fusion biopsy 4/11/23   - Cipro 500 mg BID prior         Follow Up:   Return for 4/11/23 TRUS prostate biopsy in clinic.    I spent approximately 30 minutes providing clinical care for this patient; including review of patient's chart and provider documentation, face to face time spent with patient in examination room (obtaining history, performing physical exam, discussing diagnosis and management options), placing orders, and completing patient documentation.     Chi Berger MD  [facility] [place] [facility] [place]

## 2023-03-29 LAB — CREAT BLDA-MCNC: 1 MG/DL (ref 0.6–1.3)

## 2023-04-06 RX ORDER — IPRATROPIUM BROMIDE 21 UG/1
SPRAY, METERED NASAL
Qty: 90 EACH | Refills: 1 | Status: SHIPPED | OUTPATIENT
Start: 2023-04-06

## 2023-04-11 ENCOUNTER — PROCEDURE VISIT (OUTPATIENT)
Dept: UROLOGY | Facility: CLINIC | Age: 73
End: 2023-04-11
Payer: MEDICARE

## 2023-04-11 VITALS — WEIGHT: 252 LBS | BODY MASS INDEX: 36.08 KG/M2 | HEIGHT: 70 IN

## 2023-04-11 DIAGNOSIS — R97.20 ELEVATED PROSTATE SPECIFIC ANTIGEN (PSA): ICD-10-CM

## 2023-04-11 DIAGNOSIS — R97.20 BPH WITH ELEVATED PSA: Primary | ICD-10-CM

## 2023-04-11 DIAGNOSIS — N40.0 BPH WITH ELEVATED PSA: Primary | ICD-10-CM

## 2023-04-11 PROCEDURE — 76942 ECHO GUIDE FOR BIOPSY: CPT | Performed by: STUDENT IN AN ORGANIZED HEALTH CARE EDUCATION/TRAINING PROGRAM

## 2023-04-11 PROCEDURE — 55700 PR PROSTATE NEEDLE BIOPSY ANY APPROACH: CPT | Performed by: STUDENT IN AN ORGANIZED HEALTH CARE EDUCATION/TRAINING PROGRAM

## 2023-04-11 NOTE — PATIENT INSTRUCTIONS
Prostate Biopsy Post-Procedure Care/Expectations     Follow these guidelines after your procedure in order to assist with your recovery.    Activity  - Limit yourself to light activity only for 2-3 days after your procedure to prevent rectal and urinary bleeding  - You may return to work in 24 hours     Bleeding  - It is common to notice bleeding in the urine, in the semen, and in the stool after your prostate biopsy   - Urinary bleeding usually stops within a week and is typically light  - Rectal bleeding or blood in the stool can persist for up to 1 week; if you experience very heavy rectal bleeding with large blood clots, or become light headed, present to the nearest emergency department and call your urologist  - Blood in the semen (red discoloration or “maria elena” discoloration) can persist for up to 6 weeks and this is not inherently harmful to you or your partner     Urination  - You may experience a few days of urinary urgency and frequency after your biopsy which is expected  - Drink plenty of fluid to flush out your bladder and urethra   - If you are unable to urinate for more than 8 hours after your procedure, you may be experiencing urinary retention related to prostatic swelling, and should contact your urologist or present to the nearest emergency department for evaluation and possible urethral catheter placement     Bathing/Showering  - You may resume normal showering and bathing after your procedure     Pain Control  - You will likely have some rectal or pelvic discomfort after your procedure, but this is not typically severe, and very rarely requires the use of narcotics for pain control   - If you experience rectal or pelvic discomfort post-procedure, you should use over the counter Tylenol (Acetaminophen) or Advil/Motrin (Ibuprofen)     Sexual Activity  - Refrain from sexual activity and ejaculation for at least 1 week post-procedure to prevent bleeding and possibly pain    When to call your doctor:      - ANY fever after prostate biopsy is ALWAYS considered significant and should be reported to your urologist right away as this can indicate the possibility of sepsis (bacteria in blood stream) which can be life threatening; the risk of post-prostate biopsy sepsis is less than 4%     - If you experience any of the following symptoms while at home, call your urologist and make plans to present to the nearest emergency department:     - Fever >100 F, shaking chills, nausea or vomiting, profuse sweating   - If you are unable to urinate for more than 8 hours after your biopsy, call your urologist and present to the nearest emergency department for evaluation

## 2023-04-11 NOTE — PROGRESS NOTES
Preprocedure diagnosis  Elevated PSA    Postprocedure diagnosis  Elevated PSA    Procedure  Transrectal ultrasound-guided prostate biopsy, local prostate block with 1% lidocaine injection    Attending surgeon  Chi Berger MD    Anesthesia  1% Lidocaine prostate block    Complications  None    Indications  72 y.o. malewho has a history of elevated PSA who presents today for transrectal ultrasound-guided prostate biopsy after discussion of risks, benefits, alternatives.  Informed consent was obtained.  Patient has taken his ciprofloxacin pre-procedurally and completed an enema the night before his biopsy.    MRI prostate demonstrates PIRADS4 lesions bilaterally on peripheral zone at midgland, here for cognitive fusion biopsy.     Findings  -Digital rectal examination = Difficult to palpate given habitus, however no obvious induration or nodularity  -Prostate volume measurements = 76 cc volume  -PSA density = 0.057  -Total number of biopsy cores= 14    Lab Results   Component Value Date    PSA 4.4 (H) 02/03/2023    PSA 3.5 07/27/2022    PSA 4.1 (H) 06/20/2022    PSA 3.710 06/17/2021    PSA 2.880 12/11/2019    PSA 2.830 11/20/2018     MRI PROSTATE 3/26/23  IMPRESSION:  Impression:  1. Left mid gland peripheral zone 7 mm lesion consistent with PI-RADS 4 finding.  2. Right mid gland peripheral zone 10 mm lesion consistent with PI-RADS 4 finding.   3. Lesion on left bulges capsule without clear EPE. Lesion on right abuts capsule without clear EPE.  4. Negative for pelvic lymphadenopathy.    Procedure   The patient was positioned and prepped in a left lateral position with lower extremities flexed.       A digital rectal exam was performed identifying a smooth gland without induration or nodules.     The rectal ultrasound probe was slowly introduced into the rectum without difficulty.  The prostate and seminal vesicles were inspected systematically using axial and sagittal views with the ultrasound.      The  dimensions of the prostate were measured, for a calculated volume of 76 mL, PSA Density 0.057.      Next 5 cc of 1% lidocaine was injected at the right and left neurovascular bundles at the junction of the seminal vesicles bilaterally.  Next using a true cut biopsy needle, 14 prostate cores were collected. The specific locations were the following: left lateral base, left lateral mid, left lateral apex, left medial base, left medial mid, and left medial apex, right lateral base, right lateral mid, right lateral apex, right medial base, right medial mid, right medial apex, and right and left transition zones.  The rectal ultrasound probe was held in place for 2 minutes for hemostasis.  The rectal ultrasound probe was removed.  A TONY was again performed revealing little blood in the rectum.  The patient tolerated the procedure well.     Disposition/Follow Up:     1.  The patient was instructed to drink plenty of fluids and warned about possible complications and side effects including, but not limited to, blood in the urine, stool and semen as well as bloodstream infection.  He was instructed to call the office if there are any issues, especially fevers or flu-like symptoms.    2.  Continue antibiotic for a total of 3 days.  3.  F/u Friday 4/21 for pathology discussion     Chi Berger MD  [facility] [place] [facility]

## 2023-04-12 LAB — REF LAB TEST METHOD: NORMAL

## 2023-04-21 ENCOUNTER — OFFICE VISIT (OUTPATIENT)
Dept: UROLOGY | Facility: CLINIC | Age: 73
End: 2023-04-21
Payer: MEDICARE

## 2023-04-21 VITALS — HEIGHT: 70 IN | WEIGHT: 252 LBS | BODY MASS INDEX: 36.08 KG/M2

## 2023-04-21 DIAGNOSIS — R97.20 BPH WITH ELEVATED PSA: ICD-10-CM

## 2023-04-21 DIAGNOSIS — R97.20 ELEVATED PROSTATE SPECIFIC ANTIGEN (PSA): ICD-10-CM

## 2023-04-21 DIAGNOSIS — C61 PROSTATE CANCER: Primary | ICD-10-CM

## 2023-04-21 DIAGNOSIS — N40.0 BPH WITH ELEVATED PSA: ICD-10-CM

## 2023-04-21 LAB
BILIRUB BLD-MCNC: NEGATIVE MG/DL
CLARITY, POC: CLEAR
COLOR UR: YELLOW
EXPIRATION DATE: ABNORMAL
GLUCOSE UR STRIP-MCNC: NEGATIVE MG/DL
KETONES UR QL: NEGATIVE
LEUKOCYTE EST, POC: NEGATIVE
Lab: ABNORMAL
NITRITE UR-MCNC: NEGATIVE MG/ML
PH UR: 6 [PH] (ref 5–8)
PROT UR STRIP-MCNC: NEGATIVE MG/DL
RBC # UR STRIP: ABNORMAL /UL
SP GR UR: 1.01 (ref 1–1.03)
UROBILINOGEN UR QL: NORMAL

## 2023-04-21 PROCEDURE — 81003 URINALYSIS AUTO W/O SCOPE: CPT | Performed by: STUDENT IN AN ORGANIZED HEALTH CARE EDUCATION/TRAINING PROGRAM

## 2023-04-21 PROCEDURE — 1159F MED LIST DOCD IN RCRD: CPT | Performed by: STUDENT IN AN ORGANIZED HEALTH CARE EDUCATION/TRAINING PROGRAM

## 2023-04-21 PROCEDURE — 1160F RVW MEDS BY RX/DR IN RCRD: CPT | Performed by: STUDENT IN AN ORGANIZED HEALTH CARE EDUCATION/TRAINING PROGRAM

## 2023-04-21 PROCEDURE — 99215 OFFICE O/P EST HI 40 MIN: CPT | Performed by: STUDENT IN AN ORGANIZED HEALTH CARE EDUCATION/TRAINING PROGRAM

## 2023-04-21 PROCEDURE — 51798 US URINE CAPACITY MEASURE: CPT | Performed by: STUDENT IN AN ORGANIZED HEALTH CARE EDUCATION/TRAINING PROGRAM

## 2023-04-21 NOTE — PROGRESS NOTES
Follow Up Office Visit      Patient Name: Erik Arcos  : 1950   MRN: [id number]     Chief Complaint:    Chief Complaint   Patient presents with   • Discuss Pathology results   • Elevated PSA       Referring Provider: No ref. provider found    History of Present Illness: Erik Arcos is a 72 y.o. male who presents today for follow up of elevated PSA.  Patient establish care with me in March.  DEWEY at that time was 4.4.  Denies a family history of prostate cancer.  Previously following with [facility] urology in [facility] [place].  Underwent MRI prostate for further evaluation which demonstrated PI-RADS 4 lesion in the left mid gland, PI-RADS 4 lesion in the right mid gland.  Patient's prostate volume is significantly enlarged at 70+ cc, PSA density relatively nonconcerning at 0.05.  Patient proceeded to the procedure room for prostate biopsy, this was performed on 2023.  He returns today to discuss surgical pathology and treatment options.        Patient is found to have right-sided prostate cancer, majority grade group 1, however grade group 2 prostate cancer was found in the right mid prostate.    Subjective      Review of System: Review of Systems   Genitourinary: Positive for frequency.      I have reviewed the ROS documented by my clinical staff, I have updated appropriately and I agree. Chi Berger MD    I have reviewed and the following portions of the patient's history were updated as appropriate: past family history, past medical history, past social history, past surgical history and problem list.    Medications:     Current Outpatient Medications:   •  amLODIPine (NORVASC) 5 MG tablet, Take 1 tablet by mouth Daily., Disp: 90 tablet, Rfl: 1  •  apixaban (Eliquis) 5 MG tablet tablet, Take 1 tablet by mouth 2 (Two) Times a Day., Disp: 360 tablet, Rfl: 3  •  atorvastatin (LIPITOR) 20 MG tablet, TAKE 1 TABLET DAILY, Disp: 90 tablet, Rfl: 3  •  Cholecalciferol (VITAMIN D-3) 1000  "UNITS capsule, Take 1,000 Units by mouth Daily., Disp: , Rfl:   •  ciprofloxacin (Cipro) 500 MG tablet, Take 1 tablet by mouth 2 (Two) Times a Day. Start taking the day prior to biopsy, Disp: 6 tablet, Rfl: 0  •  GaviLyte-G 236 g solution, USE AS DIRECTED PER COLONOSCOPY PREP INSTRUCTIONS SENT BY PHYSICIAN., Disp: , Rfl:   •  ipratropium (ATROVENT) 0.03 % nasal spray, INSTILL 2 SPRAYS INTO THE NOSTRIL(S) AS DIRECTED BY PROVIDER EVERY 12 (TWELVE) HOURS., Disp: 90 each, Rfl: 1  •  lisinopril (PRINIVIL,ZESTRIL) 40 MG tablet, Take 1 tablet by mouth Daily., Disp: 90 tablet, Rfl: 1  •  metoclopramide (REGLAN) 5 MG tablet, TAKE AS DIRECTED PER COLONOSCOPY PREP INSTRUCTIONS SENT BY PHYSICIAN., Disp: , Rfl:   •  Misc Natural Products (OSTEO BI-FLEX JOINT SHIELD PO), Take 1 tablet by mouth 2 (Two) Times a Day., Disp: , Rfl:   •  omeprazole (priLOSEC) 20 MG capsule, Take 1 capsule by mouth Daily., Disp: 90 capsule, Rfl: 1  •  sildenafil (VIAGRA) 100 MG tablet, Take 1 tablet by mouth Daily As Needed for Erectile Dysfunction., Disp: 30 tablet, Rfl: 11    Allergies:   No Known Allergies      Post void residual bladder scan:   0 mL    Objective     Physical Exam:   Vital Signs:   Vitals:    04/21/23 1224   Weight: 114 kg (252 lb)   Height: 177.8 cm (70\")     Body mass index is 36.16 kg/m².     Physical Exam  Constitutional:       Appearance: Normal appearance.   HENT:      Head: Normocephalic and atraumatic.      Nose: Nose normal.   Eyes:      Extraocular Movements: Extraocular movements intact.      Conjunctiva/sclera: Conjunctivae normal.      Pupils: Pupils are equal, round, and reactive to light.   Musculoskeletal:         General: Normal range of motion.      Cervical back: Normal range of motion and neck supple.   Skin:     General: Skin is warm and dry.      Findings: No lesion or rash.   Neurological:      General: No focal deficit present.      Mental Status: He is alert and oriented to person, place, and time. Mental " "status is at baseline.   Psychiatric:         Mood and Affect: Mood normal.         Behavior: Behavior normal.         Labs:   Brief Urine Lab Results  (Last result in the past 365 days)      Color   Clarity   Blood   Leuk Est   Nitrite   Protein   CREAT   Urine HCG        04/21/23 1240 Yellow   Clear   2+   Negative   Negative   Negative                      Lab Results   Component Value Date    GLUCOSE 108 (H) 06/20/2022    CALCIUM 9.4 06/20/2022     06/20/2022    K 4.6 06/20/2022    CO2 23 06/20/2022    CL 97 06/20/2022    BUN 17 06/20/2022    CREATININE 1.00 03/26/2023    EGFRIFAFRI 117 06/17/2021    EGFRIFNONA 96 06/17/2021    BCR 21 06/20/2022    ANIONGAP 10.0 04/25/2022       Lab Results   Component Value Date    WBC 5.9 06/20/2022    HGB 13.4 06/20/2022    HCT 41.1 06/20/2022    MCV 88 06/20/2022     06/20/2022       Images:   MRI Prostate With & Without Contrast    Result Date: 3/27/2023  Impression: 1. Left mid gland peripheral zone 7 mm lesion consistent with PI-RADS 4 finding. 2. Right mid gland peripheral zone 10 mm lesion consistent with PI-RADS 4 finding. 3. Lesion on left bulges capsule without clear EPE. Lesion on right abuts capsule without clear EPE. 4. Negative for pelvic lymphadenopathy. Overall PI-RADS 4 Exam. Electronically Signed: William Sanchez  3/27/2023 11:51 AM EDT  Workstation ID: [id number]      Measures:   Tobacco:   Erik DANGELO Isrrael  reports that he quit smoking about 36 years ago. His smoking use included cigarettes. He has a 37.50 pack-year smoking history. He has never used smokeless tobacco.       Assessment / Plan      Assessment/Plan:   72 y.o. male who presented today for follow up of recently diagnosed grade group 2 prostate cancer on the right mid gland.  Also grade group 1 prostate cancer on the right prostate.  Left prostate is benign on biopsy.  Minimally elevated PSA for his age.     I discussed the Morland score as well as \"Grade Group Scoring\" in detail with " respect to their role in tumor biology and behavior.      I then discussed the treatment options for a man with favorable intermediate risk disease as outlined by the NCCN in whom at least 10 years of life expectancy is anticipated:    1.  Surgery with pelvic lymph node dissection +/- adjuvant therapies  2.  External beam radiotherapy or brachytherapy  3.  Active surveillance    Radiation  I had a brief discussion with the patient's about some of the pros and cons to radiation, pros would include lower upfront quality of life changes and reduced initial side effects associated with urinary incontinence and erectile dysfunction, however both of these are possible long-term.  We also discussed possibilities of bladder and rectal irritation with radiation.  I also briefly mentioned the difficulty associated with post radiation prostatectomy if the patient were to develop a recurrence after definitive treatment with radiation.  This is in contrast to upfront surgery with the ability to perform salvage or adjuvant radiation if the patient experiences biochemical recurrence after definitive therapy with surgery.  Patient expressed understanding.    As I do with all patients I  with newly diagnosed prostate cancer, I encouraged him to seek out further discussion with Radiation-Oncology for a more detailed discussion, and offered him a referral if he would like.     Surveillance  I discussed the use of semi-annual PSA testing, periodic prostate MRI and targeted biopsies.  I explained that the ideal candidate for active surveillance is a man with low risk prostate cancer.  I discussed that some centers have placed patients with favorable intermediate risk disease on active surveillance protocols and this is currently being study, however these patients are at an increased with of metastatic progression during follow-up and that close monitoring would be required.  I explained that I would recommend molecular tumor  testing in order to better understand their risk if they do choose this approach.  I additionally explained that I prefer yearly biopsies in men with intermediate risk disease.    Surgery  I reviewed the role of surgery and the relevant surgical anatomy and the role of the robotic platform.  I explained that surgery for prostate cancer exists on a continuum of quality of life outcomes and cancer control.  We reviewed that a maximal cancer surgery is also associated with maximal impacts on quality of life (erectile dysfunction and incontinence).  I explained that the approach utilized for the surgery is driven by his goals of cancer care and his particular pathology and staging.      I explained that incontinence after robotic or open prostatectomy is typically an inevitability, but usually improves within weeks after surgery, the majority of men are dry just a few months after surgery, but some may struggle with incontinence out to a year, and some men may still be dealing with incontinence after 12 months.  Greater than 90% of men will achieve continence at 1 year.  Failure to achieve continence after 12 months is considered abnormal, and patients are typically offered surgical treatment options to correct this if needed.    I reviewed that the rate of potency is dependent on baseline functional status and time.  Specifically if a bilateral nerve sparing procedure were performed in the setting of perfect erections pre-operatively, that we would expect roughly 85% of men to regain potency within 2 years of surgery; most of them beginning their recovery around 6-9 months from surgery.  Of these 85% of men, roughly 50% will require medications to support their erections long-term and that all men will initially require some degree of medication support.  Of the 15% of men who do not regain function, this will require either a vacuum erection device or injection therapy.  The trade-off is that with more preserved  "tissue there is a greater probability of positive surgical margins.  The presence of a margin would therefore be associated with an increased risk of recurrent disease and need for adjuvant and salvage therapies.       On the alternative end of the surgical spectrum we could proceed with with \"wide\" dissection bilaterally, which would maximize the tissues removed.  This would result in longer time to continence and potency only achievable with injection or vacuum erection devices given removal of the nerves which are necessary for natural erections.  The rate of continence is the same 95% at 1 year, but it takes more time to achieve that result, with most men achieving continence around 6 months from surgery.  While this approach does not guarantee negative margins and a lack of recurrence, the rate of margins is lessened with the greater tissue removed.    We discussed the role of pelvic lymphadenectomy or removal of the pelvic lymph node tissues to assist with staging the patient and ruling out local prostate cancer spread, we also discussed that lymph node removal may offer cancer specific survival benefit in some men as well, by removing a potential source of microscopic cancer cells or potential sites of spread.  I described to the patient that I always remove lymph nodes during prostatectomy, and then I believe it gives us the best and most robust information in terms of his overall cancer staging, which may inform need for further treatment post prostatectomy if advanced disease or locally metastatic disease is found.    I also reviewed the specific procedural risks, which include, but are not limited to infection, bleeding, need for blood transfusion, and injury to surrounding structures including the rectum, small bowel, bladder, ureters, blood vessels, nerves specifically the obturator nerve.  I reviewed the general procedural risks of wound healing complications, wound infections, conversion to open " procedure as well as termination of procedure, or need for additional procedures. We have discussed the risk of long term neuropraxia, air emboli, MI, DVT, PE, stroke, and death.      Survivorship   I then discussed survivorship care which consists of monitoring for recurrence and management of treatment related side effects.    I reviewed how pathology dictates follow-up and risk of recurrence.  I explained that men with treated prostate cancer (surgery or radiation) are at risk of recurrence during follow-up and that historically up around 30% of men will require adjuvant treatments; often based on the post-surgical pathology.  I additionally reviewed how monitoring is performed to maximize the benefit of adjuvant therapies should they be required.      I provided the patient a copy of my prostate cancer packet and encouraged him to review it in detail as it reinforces and expands on the risks and benefits of each approach to managing prostatectomy.    Discussion summary  I did discuss with the patient based on his low-volume grade group 2 disease in the right prostate and minimally elevated PSA he would certainly be a candidate for initial active surveillance however the patient's preference is to proceed with definitive treatment.  He is obese and we discussed that surgical options in a man of his size and his age may come with a higher risk of quality of life changes including incontinence, and longer recovery.  He may be cured with CyberKnife radiation therapy alone.  He would like a referral to radiation oncology to discuss CyberKnife therapy.  I think this is a reasonable plan given his goals of care and age with comorbidities.    Diagnoses and all orders for this visit:    1. Prostate cancer (Primary)  -     Ambulatory Referral to Radiation Oncology-[place]    2. Elevated prostate specific antigen (PSA)  -     POC Urinalysis Dipstick, Automated    3. BPH with elevated PSA  -     POC Urinalysis Dipstick,  Automated           Follow Up:   No follow-ups on file.    I spent approximately 40 minutes providing clinical care for this patient; including review of patient's chart and provider documentation, face to face time spent with patient in examination room (obtaining history, performing physical exam, discussing diagnosis and management options), placing orders, and completing patient documentation.     Chi Berger MD  [facility] [place] [facility] [place]

## 2023-04-27 ENCOUNTER — HOSPITAL ENCOUNTER (OUTPATIENT)
Dept: RADIATION ONCOLOGY | Facility: HOSPITAL | Age: 73
Setting detail: RADIATION/ONCOLOGY SERIES
Discharge: HOME | End: 2023-04-27
Payer: COMMERCIAL

## 2023-04-27 ENCOUNTER — OFFICE VISIT (OUTPATIENT)
Dept: RADIATION ONCOLOGY | Facility: HOSPITAL | Age: 73
End: 2023-04-27
Payer: COMMERCIAL

## 2023-04-27 VITALS
BODY MASS INDEX: 36.33 KG/M2 | RESPIRATION RATE: 18 BRPM | TEMPERATURE: 97 F | HEART RATE: 75 BPM | WEIGHT: 253.2 LBS | SYSTOLIC BLOOD PRESSURE: 188 MMHG | OXYGEN SATURATION: 94 % | DIASTOLIC BLOOD PRESSURE: 88 MMHG

## 2023-04-27 DIAGNOSIS — C61 PROSTATE CANCER: Primary | ICD-10-CM

## 2023-04-27 DIAGNOSIS — E66.09 CLASS 1 OBESITY DUE TO EXCESS CALORIES WITH SERIOUS COMORBIDITY AND BODY MASS INDEX (BMI) OF 34.0 TO 34.9 IN ADULT: Primary | ICD-10-CM

## 2023-04-27 DIAGNOSIS — C61 PROSTATE CANCER: ICD-10-CM

## 2023-04-27 PROCEDURE — G0463 HOSPITAL OUTPT CLINIC VISIT: HCPCS

## 2023-04-27 RX ORDER — SULFAMETHOXAZOLE AND TRIMETHOPRIM 800; 160 MG/1; MG/1
1 TABLET ORAL 2 TIMES DAILY
Qty: 6 TABLET | Refills: 0 | Status: SHIPPED | OUTPATIENT
Start: 2023-05-08 | End: 2023-05-11

## 2023-04-27 NOTE — PROGRESS NOTES
CONSULTATION NOTE    NAME:      Erik Arcos  :                                                          1950  DATE OF CONSULTATION:                       23  REQUESTING PHYSICIAN:                   Chi Berger MD  REASON FOR CONSULTATION:           Further evaluation management of the patient's adenocarcinoma of the prostate [place] 3+4 equal 7 pretreatment PSA 4.4           BRIEF HISTORY:  Erik Arcos  is a very pleasant 72 y.o. male  with an elevated PSA of 4.4 back in February.  The patient had a colonoscopy in March that showed several polyps but no invasive disease.  The patient then had an MRI scan of his prostate 3/26/2023.  This showed no evidence of extraprostatic extension or adenopathy but the patient did have 2 PI-RADS 4 lesions.  1 which was abutting the capsule.  The patient had a biopsy with Dr. Berger/.  Patient had [place] 3+3 equal 6 disease of the right base patient had right mid disease with [place] 3+4 equal 7 disease and at the right apex and [place] 3+3 equal 6 disease.     The patient discussed options with Dr. Berger.  He was interested in CyberKnife SBRT, and the patient was referred to our clinic discussed options.      BMI:  Body mass index is 36.33 kg/m².      Social History     Substance and Sexual Activity   Alcohol Use No       No Known Allergies    Social History     Tobacco Use   • Smoking status: Former     Packs/day: 2.50     Years: 15.00     Pack years: 37.50     Types: Cigarettes     Quit date: 1987     Years since quittin.0   • Smokeless tobacco: Never   Vaping Use   • Vaping Use: Never used   Substance Use Topics   • Alcohol use: No   • Drug use: No         Past Medical History:   Diagnosis Date   • Arrhythmia    • Arthritis    • Atrial fibrillation    • Coronary artery disease    • Full dentures    • GERD (gastroesophageal reflux disease)    • History of prediabetes    • Hyperlipidemia    • Hypertension    • LAUREL on CPAP     • Prostate cancer    • Sleep apnea    • Vitamin D deficiency    • Wears glasses        family history includes Cancer in his mother; Diabetes in his half-sister; No Known Problems in his father, half-brother, and sister; Uterine cancer in his mother.     Past Surgical History:   Procedure Laterality Date   • ANKLE SURGERY     • CARDIAC ELECTROPHYSIOLOGY PROCEDURE N/A 04/27/2022    Procedure: PVA (persistent), hold Tikosyn x 5 days;  Surgeon: William Hargrove MD;  Location: [facility] INVASIVE LOC[facility];  Service: Cardiovascular;  Laterality: N/A;   • COLONOSCOPY      Complete   • CYSTOSCOPY  Do not now   • POLYPECTOMY     • STOMACH SURGERY      barretts esophagus        Review of Systems   Respiratory: Positive for shortness of breath.    Genitourinary: Positive for nocturia.    Neurological: Positive for light-headedness.    A full 14 point review of systems was performed and was negative except as noted in the HPI.         Objective   VITAL SIGNS:   Vitals:    04/27/23 1033   BP: (!) 188/88   Pulse: 75   Resp: 18   Temp: 97 °F (36.1 °C)   TempSrc: Temporal   SpO2: 94%   Weight: 115 kg (253 lb 3.2 oz)   PainSc: 0-No pain        IPSS Questionnaire (AUA-7):  Over the past month…    1)  Incomplete Emptying  How often have you had a sensation of not emptying your bladder?  4 - More than half the time   2)  Frequency  How often have you had to urinate less than every two hours? 3   3)  Intermittency  How often have you found you stopped and started again several times when you urinated?  1   4) Urgency  How often have you found it difficult to postpone urination?  3 - About half the time   5) Weak Stream  How often have you had a weak urinary stream?  3 - About half the time   6) Straining  How often have you had to push or strain to begin urination?  1 - Less than 1 time in 5   7) Nocturia  How many times did you typically get up at night to urinate?  2 - 2 times   Total Score:  17       Quality of life due to urinary  symptoms:  If you were to spend the rest of your life with your urinary condition the way it is now, how would you feel about that? 3-Mixed   Urine Leakage (Incontinence) 1-Mild (A few drops a day, no pad use)     Sexual Health Inventory  Current Status    1)  How do you rate your confidence that you could achieve and keep an erection? 3-Moderate   2) When you had erections with sexual stimulation, how often were your erections hard enough for penetration (entering your partner)? 4-Most times (much more than half the time)   3)  During sexual intercourse, how often were you able to maintain your erection after you had penetrated (entered) into your partner? 3-Sometimes (about half the time)   4) During sexual intercourse, how difficult was it to maintain your erection to completion of intercourse? 4-Slightly difficult   5) When you attempted sexual intercourse, how often was it satisfactory to you? 4-Most times (much more than half the time)   Total Score: 18       Bowel Health Inventory  Current Status: 0-No problems, no rectal bleeding, no discharge, less then 5 bowel movements a day       KPS       90%    Physical Exam  Vitals and nursing note reviewed.   Constitutional:       General: He is not in acute distress.     Appearance: He is well-developed. He is obese.      Comments: Very pleasant and engaged   HENT:      Head: Normocephalic and atraumatic.   Eyes:      Conjunctiva/sclera: Conjunctivae normal.      Pupils: Pupils are equal, round, and reactive to light.   Neck:      Comments: No obviously enlarged cervical or supraclavicular LAD.  Cardiovascular:      Comments: Patient well perfused. Non cyanotic. No prominent JVD. No pedal edema  Pulmonary:      Effort: Pulmonary effort is normal.      Breath sounds: Normal breath sounds. No stridor. No wheezing.   Abdominal:      General: There is no distension.      Palpations: Abdomen is soft.   Musculoskeletal:         General: Normal range of motion.       Cervical back: Normal range of motion and neck supple.      Comments: Patient moves all extremities spontaneously.    Skin:     General: Skin is warm and dry.   Neurological:      Mental Status: He is alert and oriented to person, place, and time.      Comments: Coordination intact.  Asks excellent questions   Psychiatric:         Behavior: Behavior normal.         Thought Content: Thought content normal.         Judgment: Judgment normal.              The following portions of the patient's history were reviewed and updated as appropriate: allergies, current medications, past family history, past medical history, past social history, past surgical history and problem list.  I have personally requested reviewed and interpreted the patient's images and radiology reports and pathology reports listed below:  MRI Prostate With & Without Contrast    Result Date: 3/27/2023  Impression: 1. Left mid gland peripheral zone 7 mm lesion consistent with PI-RADS 4 finding. 2. Right mid gland peripheral zone 10 mm lesion consistent with PI-RADS 4 finding. 3. Lesion on left bulges capsule without clear EPE. Lesion on right abuts capsule without clear EPE. 4. Negative for pelvic lymphadenopathy. Overall PI-RADS 4 Exam. Electronically Signed: William Sanchez  3/27/2023 11:51 AM EDT  Workstation ID: [id number]  PSA        6/20/2022    15:49 7/27/2022    15:26 2/3/2023    14:33   PSA   PSA 4.1   3.5   4.4           Assessment      IMPRESSION:       Patient is a very pleasant 72-year-old gentleman with intermediate risk adenocarcinoma the prostate.  The patient has [place] 3+4 equal 7 disease, PSA of 4.4 and no evidence of extraprostatic extension on his imaging.  We discussed options for treatment today.  I recommend CyberKnife SBRT for the patient 35 Morales in 5 fractions.  The patient's set up may be a little complicated due to his body habitus, and will require additional time and attention.    We discussed the acute and chronic side  effects of CyberKnife SBRT.  The patient is interested in treatments, and we will get him back in with Dr. Berger for placement of fiducial markers.    Greater than 1 hour was spent preparing for and coordinating this visit. >50% of the time was spent in direct face to face conversation with the patient teaching, answering question, and providing explanations regarding the patient's case.  The decision to treat the patient with radiation is a complex one and carries the risk of long-term side effects and complications.  The patient's malignancy represents a complicated life threatening condition that requires complex multidisciplinary management for treatment and followup.    RECOMMENDATIONS:      Adenocarcinoma the prostate  -PSA 4.4  -No extraprostatic extension  -[place] 3+4 equal 7  -Moderate volume prostate: 70 cc  -Recommend CyberKnife SBRT  -Recommend fiducial marker placement with Dr. Berger  -Recommend return for planning after fiducial marker placement.    Obesity  -Complicates care  -We will make set up more challenging  -This will take additional time and effort.         Chivo Cool MD        Errors in dictation may reflect use of voice recognition software and not all errors in transcription may have been detected prior to signing.

## 2023-04-28 ENCOUNTER — TELEPHONE (OUTPATIENT)
Dept: RADIATION ONCOLOGY | Facility: HOSPITAL | Age: 73
End: 2023-04-28
Payer: COMMERCIAL

## 2023-04-28 DIAGNOSIS — C61 PROSTATE CANCER: Primary | ICD-10-CM

## 2023-04-28 NOTE — TELEPHONE ENCOUNTER
In preperation for you cyberknife treatment:    Please start gas reducing diet on 5/30/23 with gas -x four times a day. Once after each meal and at bedtime.    6/1/23 @ 9:30am appointment with Dr. Cool    6/1/23 @ 10am you will be scanned in the department    6/1/23 @ 1130pm you will report to MRI with a scan time @ 12pm    You will need to take nothing by mouth 6 hours prior to scans, you may take any medications you normally take.  You will need to perform an enema prior to leaving home. The dietician will contact you a few days prior to starting your diet.    Appoint for markers with Dr. Berger on 5/16 their office will mail a packet with instructions for that appointment.

## 2023-05-09 ENCOUNTER — OFFICE VISIT (OUTPATIENT)
Dept: CARDIOLOGY | Facility: CLINIC | Age: 73
End: 2023-05-09
Payer: MEDICARE

## 2023-05-09 VITALS
BODY MASS INDEX: 36.39 KG/M2 | WEIGHT: 254.2 LBS | HEIGHT: 70 IN | SYSTOLIC BLOOD PRESSURE: 150 MMHG | OXYGEN SATURATION: 98 % | HEART RATE: 74 BPM | DIASTOLIC BLOOD PRESSURE: 92 MMHG

## 2023-05-09 DIAGNOSIS — I10 PRIMARY HYPERTENSION: Primary | ICD-10-CM

## 2023-05-09 DIAGNOSIS — I48.19 PERSISTENT ATRIAL FIBRILLATION: ICD-10-CM

## 2023-05-09 RX ORDER — AMLODIPINE BESYLATE 5 MG/1
1 TABLET ORAL DAILY
COMMUNITY
Start: 2022-12-28 | End: 2023-05-09 | Stop reason: SDUPTHER

## 2023-05-09 RX ORDER — AMLODIPINE BESYLATE 5 MG/1
5 TABLET ORAL DAILY
Qty: 90 TABLET | Refills: 3 | Status: SHIPPED | OUTPATIENT
Start: 2023-05-09 | End: 2023-05-09 | Stop reason: SDUPTHER

## 2023-05-09 RX ORDER — AMLODIPINE BESYLATE 5 MG/1
5 TABLET ORAL DAILY
Qty: 90 TABLET | Refills: 3 | Status: SHIPPED | OUTPATIENT
Start: 2023-05-09

## 2023-05-09 NOTE — PROGRESS NOTES
Cardiac Electrophysiology Outpatient Note  [facility]    Office Visit     Erik Arcos  [id number]  05/09/2023    Primary Care Physician: Foster Schuster MD    Referred By: No ref. provider found    Subjective     Chief Complaint   Patient presents with   • Atrial Fibrillation       History of Present Illness:   Erik Arcos is a 72 y.o. male who presents to my electrophysiology clinic for follow up of persistent atrial fibrillation.  He underwent ablation for this approximately 1 year ago.  This consisted of pulmonary vein isolation, posterior wall isolation, and CTI ablation.  He is overall done quite well since then without symptomatic recurrence of atrial fibrillation.  He has been getting treated for prostate cancer recently and this has been stressful for him.  However he has not noticed any palpitations.  He denies any chest pain, dyspnea, orthopnea, PND, or lower extremity swelling.  He has been stressed with the prostate cancer and has gained some weight, he is going to work on losing some weight soon.  Medications been doing well without issues.  Has not been checking his blood pressure at home recently..      Past Medical History:   Diagnosis Date   • Arrhythmia    • Arthritis    • Atrial fibrillation    • Coronary artery disease    • Full dentures    • GERD (gastroesophageal reflux disease)    • History of prediabetes    • Hyperlipidemia    • Hypertension    • LAUREL on CPAP    • Prostate cancer    • Prostate cancer 04/27/2023   • Sleep apnea    • Vitamin D deficiency    • Wears glasses        Past Surgical History:   Procedure Laterality Date   • ANKLE SURGERY     • CARDIAC ELECTROPHYSIOLOGY PROCEDURE N/A 04/27/2022    Procedure: PVA (persistent), hold Tikosyn x 5 days;  Surgeon: William Hargrove MD;  Location: [facility] LOCATION;  Service: Cardiovascular;  Laterality: N/A;   • COLONOSCOPY      Complete   • CYSTOSCOPY  Do not now   • POLYPECTOMY   "   • STOMACH SURGERY      barretts esophagus       Family History   Problem Relation Age of Onset   • Cancer Mother    • Uterine cancer Mother    • No Known Problems Father    • No Known Problems Sister    • No Known Problems Half-Brother    • Diabetes Half-Sister        Social History     Socioeconomic History   • Marital status:    Tobacco Use   • Smoking status: Former     Packs/day: 2.50     Years: 15.00     Pack years: 37.50     Types: Cigarettes     Quit date: 1987     Years since quittin.1   • Smokeless tobacco: Never   Vaping Use   • Vaping Use: Never used   Substance and Sexual Activity   • Alcohol use: No   • Drug use: No   • Sexual activity: Defer         Current Outpatient Medications:   •  amLODIPine (NORVASC) 5 MG tablet, Take 1 tablet by mouth Daily., Disp: 90 tablet, Rfl: 3  •  apixaban (Eliquis) 5 MG tablet tablet, Take 1 tablet by mouth 2 (Two) Times a Day., Disp: 180 tablet, Rfl: 3  •  atorvastatin (LIPITOR) 20 MG tablet, TAKE 1 TABLET DAILY, Disp: 90 tablet, Rfl: 3  •  Cholecalciferol (VITAMIN D-3) 1000 UNITS capsule, Take 1,000 Units by mouth Daily., Disp: , Rfl:   •  lisinopril (PRINIVIL,ZESTRIL) 40 MG tablet, Take 1 tablet by mouth Daily., Disp: 90 tablet, Rfl: 1  •  Misc Natural Products (OSTEO BI-FLEX JOINT SHIELD PO), Take 1 tablet by mouth 2 (Two) Times a Day., Disp: , Rfl:   •  omeprazole (priLOSEC) 20 MG capsule, Take 1 capsule by mouth Daily., Disp: 90 capsule, Rfl: 1  •  sildenafil (VIAGRA) 100 MG tablet, Take 1 tablet by mouth Daily As Needed for Erectile Dysfunction., Disp: 30 tablet, Rfl: 11  •  sulfamethoxazole-trimethoprim (Bactrim DS) 800-160 MG per tablet, Take 1 tablet by mouth 2 (Two) Times a Day for 3 days. Start taking 1 day prior to Urology procedure, Disp: 6 tablet, Rfl: 0    Allergies:   No Known Allergies    Objective   Vital Signs: Blood pressure 150/92, pulse 74, height 177.8 cm (70\"), weight 115 kg (254 lb 3.2 oz), SpO2 98 %.    PHYSICAL EXAM  General " appearance: Awake, alert, cooperative  Head: Normocephalic, without obvious abnormality, atraumatic  Neck: No JVD  Lungs: Clear to ascultation bilaterally  Heart: Regular rate and rhythm, no murmurs, 2+ LE pulses, no lower extremity swelling  Skin: Skin color, turgor normal, no rashes or lesions  Neurologic: Grossly normal     Lab Results   Component Value Date    GLUCOSE 108 (H) 06/20/2022    CALCIUM 9.4 06/20/2022     06/20/2022    K 4.6 06/20/2022    CO2 23 06/20/2022    CL 97 06/20/2022    BUN 17 06/20/2022    CREATININE 1.00 03/26/2023    EGFRIFAFRI 117 06/17/2021    EGFRIFNONA 96 06/17/2021    BCR 21 06/20/2022    ANIONGAP 10.0 04/25/2022     Lab Results   Component Value Date    WBC 5.9 06/20/2022    HGB 13.4 06/20/2022    HCT 41.1 06/20/2022    MCV 88 06/20/2022     06/20/2022     Lab Results   Component Value Date    INR 1.11 04/25/2022    INR 1.03 04/19/2022    PROTIME 14.3 04/25/2022    PROTIME 13.4 04/19/2022     Lab Results   Component Value Date    TSH 2.890 04/25/2022          Results for orders placed during the hospital encounter of 10/28/21    Adult Transthoracic Echo Complete W/ Cont if Necessary Per Protocol    Interpretation Summary  · Left ventricular wall thickness is consistent with mild concentric hypertrophy.  · The right ventricular cavity is mildly dilated.  · Left atrial volume is severely increased.  · The right atrial cavity is moderately dilated.  · The underlying rhythm appears to be atrial fibrillation  · The left ventricular ejection fraction is 60%         I personally viewed and interpreted the patient's EKG/Telemetry/lab data    Procedures    Erik Arcos  reports that he quit smoking about 36 years ago. His smoking use included cigarettes. He has a 37.50 pack-year smoking history. He has never used smokeless tobacco..          Advance Care Planning   Advance Care Planning: ACP discussion was held with the patient during this visit. Patient does not have an  advance directive, declines further assistance.     Assessment & Plan    1. Persistent atrial fibrillation   He underwent ablation for this approximately 1 year ago.  This consisted of pulmonary vein isolation, posterior wall isolation, and CTI ablation.  He is overall done quite well since then without symptomatic recurrence of atrial fibrillation.  Will continue Eliquis for anticoagulation.  We discussed that should he have any recurrence of atrial fibrillation we can discuss future options if needed.    2. Primary hypertension  Blood pressure is elevated today in clinic.  He has not been checking his blood pressure at home.  He will record his blood pressure at home for the next 1 week let us know and we will adjust as needed.       Follow Up:  No follow-ups on file.      Thank you for allowing me to participate in the care of your patient. Please do not hesitate to contact me with additional questions or concerns.      William Hargrove M.D.  Cardiac Electrophysiologist  [facility] / [facility]

## 2023-05-16 ENCOUNTER — PROCEDURE VISIT (OUTPATIENT)
Dept: UROLOGY | Facility: CLINIC | Age: 73
End: 2023-05-16
Payer: MEDICARE

## 2023-05-16 VITALS — HEART RATE: 87 BPM | BODY MASS INDEX: 36.36 KG/M2 | OXYGEN SATURATION: 98 % | WEIGHT: 254 LBS | HEIGHT: 70 IN

## 2023-05-16 DIAGNOSIS — R97.20 ELEVATED PROSTATE SPECIFIC ANTIGEN (PSA): ICD-10-CM

## 2023-05-16 DIAGNOSIS — C61 PROSTATE CANCER: Primary | ICD-10-CM

## 2023-05-16 PROCEDURE — A4648 IMPLANTABLE TISSUE MARKER: HCPCS | Performed by: STUDENT IN AN ORGANIZED HEALTH CARE EDUCATION/TRAINING PROGRAM

## 2023-05-16 PROCEDURE — 76942 ECHO GUIDE FOR BIOPSY: CPT | Performed by: STUDENT IN AN ORGANIZED HEALTH CARE EDUCATION/TRAINING PROGRAM

## 2023-05-16 PROCEDURE — 55876 PLACE RT DEVICE/MARKER PROS: CPT | Performed by: STUDENT IN AN ORGANIZED HEALTH CARE EDUCATION/TRAINING PROGRAM

## 2023-05-16 NOTE — PROGRESS NOTES
Preprocedure diagnosis  Prostate Cancer     Postprocedure diagnosis  Prostate Cancer     Procedure  1. Transrectal Ultrasound Guided Placement of Gold Fiducial Markers  2. Total Number of Markers Placed: 5    Attending surgeon  Chi Berger MD    Anesthesia  1% lidocaine injected lori-prostatic      Complications  None    Indications  72 y.o. male with a history of favorable intermediate risk prostate cancer, who has elected to proceed with external beam radiation therapy.  He presents today for placement of gold fiducial marker to guide radiation therapy planning.  Informed consent was reviewed and signed after discussion of risks, benefits, alternatives.    Findings  Uncomplicated placement of 5 gold fiducial markers, placement performed at left and right lateral base, left and right lateral apex, and 1 markers placed in a medial location at the left midportion of the prostate.     Procedure  The patient was identified, informed consent was reviewed and signed.  I confirmed with the patient that he has been taking his preprocedure antibiotics appropriately.  He was placed in the left lateral position, with knees to chest.  The ultrasound probe was inserted into the patient's rectum.  The prostate was identified.  5 mL of 1% lidocaine was injected along the neurovascular bundle on the right side.  I then performed the same anesthetic injection on the left side.      Next starting at the base of the prostate on the patient's left side, I placed one gold marker in a lateral position.  I then moved to the left apex and placed another gold marker in a lateral position.  I then switched to the right side and performed the identical procedure, placing a gold marker on the right lateral position, and then at the apex in a right lateral position.  I moved to the mid prostate and placed a medial marker at the right mid prostate.  A total of 5 gold markers were used.  No significant bleeding was encountered.  The  rectal probe was held in place for 3 minutes to confirm hemostasis.  The rectal probe was removed and there was no significant blood per rectum noted.  The patient tolerated the procedure well denies significant pain or discomfort.    Follow Up:   -F/u with Radiation Oncology as planned for radiation therapy planning  -Return to my clinic in 6 months with a repeat PSA prior and for continued prostate cancer follow-up  -Return precautions discussed, including significant hematuria, significant blood per rectum, fevers, chills, nausea, vomiting. Patient was instructed to call my office or present to the nearest emergency department with these concerns.    Chi Berger MD

## 2023-05-24 ENCOUNTER — TELEPHONE (OUTPATIENT)
Dept: CARDIOLOGY | Facility: CLINIC | Age: 73
End: 2023-05-24

## 2023-05-24 ENCOUNTER — TELEPHONE (OUTPATIENT)
Dept: CARDIOLOGY | Facility: CLINIC | Age: 73
End: 2023-05-24
Payer: COMMERCIAL

## 2023-05-24 DIAGNOSIS — I48.0 PAROXYSMAL ATRIAL FIBRILLATION: Primary | ICD-10-CM

## 2023-05-24 RX ORDER — AMLODIPINE BESYLATE 10 MG/1
10 TABLET ORAL DAILY
Qty: 30 TABLET | Refills: 4 | Status: SHIPPED | OUTPATIENT
Start: 2023-05-24

## 2023-05-24 NOTE — TELEPHONE ENCOUNTER
Patient's wife called with an update on BP. Bp ranges between 144/92 to 167/87 and HR ranges from 45 tp 111 mostly in the 70s. Wife is concerned d/t the Hrs in the 40s, patient denies feeling as if he is in afib.  I updated her they would receive a call if changes needed to be made.

## 2023-05-24 NOTE — TELEPHONE ENCOUNTER
Patient called and left a message. I tried to call him back but his wife said that he was still asleep. She will have him call us back later.

## 2023-05-24 NOTE — TELEPHONE ENCOUNTER
Patient's wife updated and aware. They would like the monitor. They would like it mailed. Patient does have upcoming CT and MRI on 6/1. He will put it on after these tests are completed.  Patient will continue to take a BP and HR log and update us in about 1 week.

## 2023-05-26 DIAGNOSIS — I48.19 PERSISTENT ATRIAL FIBRILLATION: ICD-10-CM

## 2023-05-26 RX ORDER — APIXABAN 5 MG/1
TABLET, FILM COATED ORAL
Qty: 120 TABLET | Refills: 3 | OUTPATIENT
Start: 2023-05-26

## 2023-05-26 NOTE — TELEPHONE ENCOUNTER
Patient of Dr. William Hargrove with [facility].  No follow-up currently scheduled in the heart valve center.  Will route refill request to [facility] for review and management

## 2023-05-26 NOTE — TELEPHONE ENCOUNTER
This was sent by Dr. Hargrove on 5/9/2023 to [employer]. Please review and deny or route as appropriate.

## 2023-05-30 DIAGNOSIS — I48.19 PERSISTENT ATRIAL FIBRILLATION: ICD-10-CM

## 2023-06-01 ENCOUNTER — OFFICE VISIT (OUTPATIENT)
Dept: RADIATION ONCOLOGY | Facility: HOSPITAL | Age: 73
End: 2023-06-01

## 2023-06-01 ENCOUNTER — HOSPITAL ENCOUNTER (OUTPATIENT)
Dept: MRI IMAGING | Facility: HOSPITAL | Age: 73
Discharge: HOME | End: 2023-06-01
Admitting: RADIOLOGY

## 2023-06-01 ENCOUNTER — HOSPITAL ENCOUNTER (OUTPATIENT)
Dept: RADIATION ONCOLOGY | Facility: HOSPITAL | Age: 73
Discharge: HOME | End: 2023-06-01

## 2023-06-01 VITALS
SYSTOLIC BLOOD PRESSURE: 137 MMHG | BODY MASS INDEX: 34.52 KG/M2 | HEART RATE: 91 BPM | RESPIRATION RATE: 16 BRPM | TEMPERATURE: 97.3 F | DIASTOLIC BLOOD PRESSURE: 78 MMHG | WEIGHT: 240.6 LBS | OXYGEN SATURATION: 97 %

## 2023-06-01 DIAGNOSIS — C61 PROSTATE CANCER: ICD-10-CM

## 2023-06-01 DIAGNOSIS — C61 PROSTATE CANCER: Primary | ICD-10-CM

## 2023-06-01 PROCEDURE — 72195 MRI PELVIS W/O DYE: CPT

## 2023-06-01 PROCEDURE — G0463 HOSPITAL OUTPT CLINIC VISIT: HCPCS

## 2023-06-01 RX ORDER — TAMSULOSIN HYDROCHLORIDE 0.4 MG/1
1 CAPSULE ORAL DAILY
Qty: 30 CAPSULE | Refills: 11 | Status: SHIPPED | OUTPATIENT
Start: 2023-06-01

## 2023-06-01 NOTE — PROGRESS NOTES
RE-EVALUATION  CONSULTATION NOTE    NAME:      Erik Arcos  :                                                          1950  DATE OF CONSULTATION:                       23  REQUESTING PHYSICIAN:                   Chi Berger MD  REASON FOR CONSULTATION:           Further evaluation management of the patient's adenocarcinoma of the prostate [place] 3+4 equal 7 pretreatment PSA 4.4           BRIEF HISTORY:  Erik Arcos  is a very pleasant 72 y.o. male  with an elevated PSA of 4.4 back in February.  The patient had a colonoscopy in March that showed several polyps but no invasive disease.  The patient then had an MRI scan of his prostate 3/26/2023.  This showed no evidence of extraprostatic extension or adenopathy but the patient did have 2 PI-RADS 4 lesions.  1 which was abutting the capsule.  The patient had a biopsy with Dr. Berger.  Patient had [place] 3+3 equal 6 disease of the right base patient had right mid disease with [place] 3+4 equal 7 disease and at the right apex and [place] 3+3 equal 6 disease.     The patient elected for CyberKnife SBRT.  He had his fiducial markers placed Dr. Berger and returns today for planning.    He reports no new issues.  He is recovered well from his markers.      BMI:  Body mass index is 34.52 kg/m².      Social History     Substance and Sexual Activity   Alcohol Use No       No Known Allergies    Social History     Tobacco Use   • Smoking status: Former     Packs/day: 2.50     Years: 15.00     Pack years: 37.50     Types: Cigarettes     Quit date: 1987     Years since quittin.1   • Smokeless tobacco: Never   Vaping Use   • Vaping Use: Never used   Substance Use Topics   • Alcohol use: No   • Drug use: No         Past Medical History:   Diagnosis Date   • Arrhythmia    • Arthritis    • Atrial fibrillation    • Coronary artery disease    • Full dentures    • GERD (gastroesophageal reflux disease)    • History of prediabetes    •  Hyperlipidemia    • Hypertension    • LAUREL on CPAP    • Prostate cancer    • Prostate cancer 04/27/2023   • Sleep apnea    • Vitamin D deficiency    • Wears glasses        family history includes Cancer in his mother; Diabetes in his half-sister; No Known Problems in his father, half-brother, and sister; Uterine cancer in his mother.     Past Surgical History:   Procedure Laterality Date   • ANKLE SURGERY     • CARDIAC ELECTROPHYSIOLOGY PROCEDURE N/A 04/27/2022    Procedure: PVA (persistent), hold Tikosyn x 5 days;  Surgeon: William Hargrove MD;  Location: [facility] INVASIVE LOCATION;  Service: Cardiovascular;  Laterality: N/A;   • COLONOSCOPY      Complete   • CYSTOSCOPY  Do not now   • POLYPECTOMY     • PROSTATE FIDUCIAL MARKER PLACEMENT  05/16/2023   • STOMACH SURGERY      barretts esophagus        Review of Systems   Respiratory: Positive for shortness of breath.    Genitourinary: Positive for nocturia.    Neurological: Positive for light-headedness.    A full 14 point review of systems was performed and was negative except as noted in the HPI.         Objective   VITAL SIGNS:   Vitals:    06/01/23 0903   BP: 137/78   Pulse: 91   Resp: 16   Temp: 97.3 °F (36.3 °C)   TempSrc: Temporal   SpO2: 97%   Weight: 109 kg (240 lb 9.6 oz)   PainSc: 0-No pain        IPSS Questionnaire (AUA-7):  Over the past month…    1)  Incomplete Emptying  How often have you had a sensation of not emptying your bladder?  4 - More than half the time   2)  Frequency  How often have you had to urinate less than every two hours? 3   3)  Intermittency  How often have you found you stopped and started again several times when you urinated?  1   4) Urgency  How often have you found it difficult to postpone urination?  3 - About half the time   5) Weak Stream  How often have you had a weak urinary stream?  3 - About half the time   6) Straining  How often have you had to push or strain to begin urination?  1 - Less than 1 time in 5   7)  Nocturia  How many times did you typically get up at night to urinate?  2 - 2 times   Total Score:  17       Quality of life due to urinary symptoms:  If you were to spend the rest of your life with your urinary condition the way it is now, how would you feel about that? 3-Mixed   Urine Leakage (Incontinence) 1-Mild (A few drops a day, no pad use)     Sexual Health Inventory  Current Status    1)  How do you rate your confidence that you could achieve and keep an erection? 3-Moderate   2) When you had erections with sexual stimulation, how often were your erections hard enough for penetration (entering your partner)? 4-Most times (much more than half the time)   3)  During sexual intercourse, how often were you able to maintain your erection after you had penetrated (entered) into your partner? 3-Sometimes (about half the time)   4) During sexual intercourse, how difficult was it to maintain your erection to completion of intercourse? 4-Slightly difficult   5) When you attempted sexual intercourse, how often was it satisfactory to you? 4-Most times (much more than half the time)   Total Score: 18       Bowel Health Inventory  Current Status: 0-No problems, no rectal bleeding, no discharge, less then 5 bowel movements a day       KPS       90%    Physical Exam  Vitals and nursing note reviewed.   Constitutional:       General: He is not in acute distress.     Appearance: He is well-developed. He is obese.      Comments: Very pleasant and engaged   HENT:      Head: Normocephalic and atraumatic.   Eyes:      Conjunctiva/sclera: Conjunctivae normal.      Pupils: Pupils are equal, round, and reactive to light.   Neck:      Comments: No obviously enlarged cervical or supraclavicular LAD.  Cardiovascular:      Comments: Patient well perfused. Non cyanotic. No prominent JVD. No pedal edema  Pulmonary:      Effort: Pulmonary effort is normal.      Breath sounds: Normal breath sounds. No stridor. No wheezing.   Abdominal:       General: There is no distension.      Palpations: Abdomen is soft.   Musculoskeletal:         General: Normal range of motion.      Cervical back: Normal range of motion and neck supple.      Comments: Patient moves all extremities spontaneously.    Skin:     General: Skin is warm and dry.   Neurological:      Mental Status: He is alert and oriented to person, place, and time.      Comments: Coordination intact.  Asks excellent questions   Psychiatric:         Behavior: Behavior normal.         Thought Content: Thought content normal.         Judgment: Judgment normal.              The following portions of the patient's history were reviewed and updated as appropriate: allergies, current medications, past family history, past medical history, past social history, past surgical history and problem list.  I have personally requested reviewed and interpreted the patient's images and radiology reports and pathology reports listed below:  MRI Prostate With & Without Contrast    Result Date: 3/27/2023  Impression: 1. Left mid gland peripheral zone 7 mm lesion consistent with PI-RADS 4 finding. 2. Right mid gland peripheral zone 10 mm lesion consistent with PI-RADS 4 finding. 3. Lesion on left bulges capsule without clear EPE. Lesion on right abuts capsule without clear EPE. 4. Negative for pelvic lymphadenopathy. Overall PI-RADS 4 Exam. Electronically Signed: William Sanchez  3/27/2023 11:51 AM EDT  Workstation ID: [id number]  PSA        6/20/2022    15:49 7/27/2022    15:26 2/3/2023    14:33   PSA   PSA 4.1   3.5   4.4           Assessment      IMPRESSION:       Patient is a very pleasant 72-year-old gentleman with intermediate risk adenocarcinoma the prostate.  The patient has [place] 3+4 equal 7 disease, PSA of 4.4 and no evidence of extraprostatic extension on his imaging.  We discussed options for treatment today.  I recommend CyberKnife SBRT for the patient 35 [place] in 5 fractions.      The patient status post fiducial  marker placement Dr. Berger.  He reports today for radiation planning.  He has recovered well from his markers.  He is ready to undergo planning.    I spent 20 minutes on the patient's case today.    RECOMMENDATIONS:      Adenocarcinoma the prostate  -PSA 4.4  -No extraprostatic extension  -[place] 3+4 equal 7  -Moderate volume prostate: 70 cc  -Recommend CyberKnife SBRT  -Recommend fiducial marker placement with Dr. Berger  -Recommend return for planning after fiducial marker placement.    Obesity  -Complicates care  -We will make set up more challenging  -This will take additional time and effort.  -STP          Chivo Cool MD        Errors in dictation may reflect use of voice recognition software and not all errors in transcription may have been detected prior to signing.

## 2023-06-13 DIAGNOSIS — I48.19 PERSISTENT ATRIAL FIBRILLATION: ICD-10-CM

## 2023-06-13 RX ORDER — APIXABAN 5 MG/1
TABLET, FILM COATED ORAL
Qty: 120 TABLET | Refills: 3 | OUTPATIENT
Start: 2023-06-13

## 2023-06-13 RX ORDER — AMLODIPINE BESYLATE 10 MG/1
TABLET ORAL
Qty: 90 TABLET | Refills: 2 | Status: SHIPPED | OUTPATIENT
Start: 2023-06-13

## 2023-06-22 PROBLEM — Z12.11 ENCOUNTER FOR SCREENING COLONOSCOPY: Status: RESOLVED | Noted: 2017-04-06 | Resolved: 2023-06-22

## 2023-06-22 PROBLEM — Z12.5 SCREENING PSA (PROSTATE SPECIFIC ANTIGEN): Status: RESOLVED | Noted: 2017-11-14 | Resolved: 2023-06-22

## 2023-06-22 PROBLEM — Z00.00 ENCOUNTER FOR MEDICARE ANNUAL WELLNESS EXAM: Status: RESOLVED | Noted: 2018-11-20 | Resolved: 2023-06-22

## 2023-07-05 ENCOUNTER — HOSPITAL ENCOUNTER (OUTPATIENT)
Dept: RADIATION ONCOLOGY | Facility: HOSPITAL | Age: 73
Setting detail: RADIATION/ONCOLOGY SERIES
Discharge: HOME | End: 2023-07-05
Payer: MEDICARE

## 2023-08-07 ENCOUNTER — HOSPITAL ENCOUNTER (OUTPATIENT)
Dept: RADIATION ONCOLOGY | Facility: HOSPITAL | Age: 73
Setting detail: RADIATION/ONCOLOGY SERIES
Discharge: HOME | End: 2023-08-07
Payer: MEDICARE

## 2023-08-07 ENCOUNTER — OFFICE VISIT (OUTPATIENT)
Dept: RADIATION ONCOLOGY | Facility: HOSPITAL | Age: 73
End: 2023-08-07
Payer: MEDICARE

## 2023-08-07 DIAGNOSIS — C61 PROSTATE CANCER: Primary | ICD-10-CM

## 2023-08-07 NOTE — PROGRESS NOTES
TELEMEDICINE FOLLOW UP NOTE    PATIENT:                                                      Erik Arcos  MEDICAL RECORD #:                        [id number]  :                                                          1950  COMPLETION DATE:   2023  DIAGNOSIS:     Adenocarcinoma of the prostate  -  Stage IIB (T1c N0 M0)    This visit has been converted to a telehealth virtual visit, the patient's preferred method for today's follow-up.  Total time of discussion was 16 minutes.  The patient has given verbal consent.      BRIEF HISTORY:  Erik Arcos is a very pleasant 72 y.o. male found earlier this year to have an elevated PSA of 4.4.  The patient was sent for MRI scan of his prostate 3/26/2023 which showed two PI-RADS 4 lesions, 1 of which was abutting the capsule.  Imaging otherwise showed no evidence of extraprostatic extension or concerning adenopathy.  The patient underwent a biopsy with Dr. Berger.  This revealed [place] 3+3 = 6 disease in the right base and right apex, and [place] 3+4 = 7 disease at the right mid.  The patient elected for definitive treatment with CyberKnife for his favorable intermediate risk disease.  He underwent placement of fiducial markers without issue.  He then completed CyberKnife SBRT to a dose of 35 [place] in 5 fractions on 2023.  The patient tolerated treatment well.  The patient reports treatment-related fatigue is slowly subsiding.  He did experience exacerbation of urinary frequency, urgency, weak stream, and dysuria beyond his chronic, moderate lower urinary tract symptoms.  He reports at this point, aforementioned symptoms continue to improve.  He remains on daily Flomax.  He reports dysuria has resolved.  No hematuria.  No urinary incontinence.  The patient also experienced exacerbation of bleeding external hemorrhoid, which he relates to irritation from fleets enema preparation.  The patient reports use of OTC hemorrhoid cream with relief.  The  patient otherwise reports bowel function is normal and he denies acute GI complaints.  The patient continues to use sildenafil as needed with good effect for chronic ED.  He denies additional concerns or complaints at this time.      IPSS Questionnaire (AUA-7):  Over the past month.    1)  Incomplete Emptying  How often have you had a sensation of not emptying your bladder?  4 - More than half the time   2)  Frequency  How often have you had to urinate less than every two hours? 3 - About half the time   3)  Intermittency  How often have you found you stopped and started again several times when you urinated?  1 - Less than 1 time in 5   4) Urgency  How often have you found it difficult to postpone urination?  3 - About half the time   5) Weak Stream  How often have you had a weak urinary stream?  3 - About half the time   6) Straining  How often have you had to push or strain to begin urination?  1 - Less than 1 time in 5   7) Nocturia  How many times did you typically get up at night to urinate?  3 - 3 times   Total Score:  18       Quality of life due to urinary symptoms:  If you were to spend the rest of your life with your urinary condition the way it is now, how would you feel about that? 3-Mixed   Urine Leakage (Incontinence) 1-Mild (A few drops a day, no pad use)     Sexual Health Inventory  Current Status    1)  How do you rate your confidence that you could achieve and keep an erection? 3-Moderate   2) When you had erections with sexual stimulation, how often were your erections hard enough for penetration (entering your partner)? 4-Most times (much more than half the time)   3)  During sexual intercourse, how often were you able to maintain your erection after you had penetrated (entered) into your partner? 3-Sometimes (about half the time)   4) During sexual intercourse, how difficult was it to maintain your erection to completion of intercourse? 4-Slightly difficult   5) When you attempted sexual  intercourse, how often was it satisfactory to you? 4-Most times (much more than half the time)   Total Score: 18       Bowel Health Inventory  Current Status: 0-No problems, no rectal bleeding, no discharge, less then 5 bowel movements a day            MEDICATIONS: Medication reconciliation for the patient was reviewed and confirmed in the electronic medical record.    Review of Systems   Constitutional:  Positive for fatigue.   Genitourinary:  Positive for frequency and nocturia.    All other systems reviewed and are negative.        KPS 90%      Physical Exam  Pulmonary:      Respirations even, unlabored. No audible wheezing or cough.  Neurological:      A+Ox4, conversant, answers questions appropriately.  Psychiatric:     Judgement, affect, and decision-making WNL.    Limited physical exam as visit was conducted remotely via telephone.          The following portions of the patient's history were reviewed and updated as appropriate: allergies, current medications, past family history, past medical history, past social history, past surgical history and problem list.         Diagnoses and all orders for this visit:    1. Prostate cancer (Primary)         IMPRESSION: Erik Arcos is a 72 y.o. gentleman with recent diagnosis of a clinical T1c, [place] 3+4 = 7 prostate adenocarcinoma with pre-treatment PSA of 4.4.  He is now 1 month status post CyberKnife SBRT.  He tolerated treatment well.  He developed the anticipated grade 1 fatigue as well as grade 1 lower urinary tract symptoms which continue to appropriately subside at this point.  The patient and I reviewed plan to taper/discontinue Flomax, as tolerated.  Hopefully, acute on chronic lower urinary tract symptoms will continue to subside with time and with the downsizing of his enlarged prostate gland.  Mr. Arcos and I have reviewed the survivorship care plan in detail.  We discussed diagnosis, follow-up intervals, biannual PSA monitoring, and expectations  for response to treatment.  A copy of the care plan has been made to the patient but a copy has also been sent to his PCP.  Mr. Arcos continues routine urologic surveillance under the care of Dr. Berger, with follow-up and repeat PSA scheduled 11/27/2023.  We will schedule the patient to return to our clinic in 6 months.      RECOMMENDATIONS:      Adenocarcinoma of the prostate  -stage IIB (T1c N0 M0)  -PSA 4.4  -[place] 3+4 = 7  -No evidence of extraprostatic extension on pelvis MRI  -Prostate volume 70 cc  -Status post fiducial marker placement with Dr. Berger  -Status post CyberKnife SBRT 35 Gray in 5 fractions   -completed 7/5/2023  -Repeat PSA 11/2023  -Follows with Dr. Berger  -RTC 6 months      Return in about 6 months (around 2/7/2024) for Office Visit.    SONA Davis spent a total of 30 minutes on today's visit, with more than 16 minutes in virtual communication with the patient via telephone, and the remainder of the time spent in reviewing the relevant history, records, available imaging, and for documentation.

## 2023-11-20 ENCOUNTER — TELEPHONE (OUTPATIENT)
Dept: UROLOGY | Facility: CLINIC | Age: 73
End: 2023-11-20
Payer: MEDICARE

## 2023-11-20 NOTE — TELEPHONE ENCOUNTER
Patient calling to ask if the PSA order is in his chart, since pt will go tomorrow to lab and have PSA done.  Informed pt PSA test order was in his chart.

## 2023-11-27 ENCOUNTER — OFFICE VISIT (OUTPATIENT)
Dept: UROLOGY | Facility: CLINIC | Age: 73
End: 2023-11-27
Payer: MEDICARE

## 2023-11-27 VITALS — OXYGEN SATURATION: 98 % | HEART RATE: 86 BPM | WEIGHT: 252.6 LBS | BODY MASS INDEX: 36.16 KG/M2 | HEIGHT: 70 IN

## 2023-11-27 DIAGNOSIS — N40.1 BPH WITH OBSTRUCTION/LOWER URINARY TRACT SYMPTOMS: ICD-10-CM

## 2023-11-27 DIAGNOSIS — C61 PROSTATE CANCER: Primary | ICD-10-CM

## 2023-11-27 DIAGNOSIS — N13.8 BPH WITH OBSTRUCTION/LOWER URINARY TRACT SYMPTOMS: ICD-10-CM

## 2023-11-27 PROCEDURE — 99213 OFFICE O/P EST LOW 20 MIN: CPT | Performed by: STUDENT IN AN ORGANIZED HEALTH CARE EDUCATION/TRAINING PROGRAM

## 2023-11-27 PROCEDURE — 1159F MED LIST DOCD IN RCRD: CPT | Performed by: STUDENT IN AN ORGANIZED HEALTH CARE EDUCATION/TRAINING PROGRAM

## 2023-11-27 PROCEDURE — 1160F RVW MEDS BY RX/DR IN RCRD: CPT | Performed by: STUDENT IN AN ORGANIZED HEALTH CARE EDUCATION/TRAINING PROGRAM

## 2023-11-27 NOTE — PROGRESS NOTES
Prostate Cancer Office Visit      Patient Name: Erik Arcos  : 1950   MRN: [id number]     Chief Complaint:  Prostate Cancer.   Chief Complaint   Patient presents with    Prostate Cancer       Referring Provider: No ref. provider found    History of Present Illness: Erik Arcos is a 73 y.o. male who presents today for follow up.  Patient has a history of low-volume favorable intermediate risk prostate cancer in addition to BPH, prostate volume was 70 g and MRI prostate demonstrated a PI-RADS 4 lesion bilaterally.  Prostate biopsy 2023 demonstrated low-volume grade group 2 and grade group 1 disease primarily involving the right prostate.  Left prostate was benign.  He underwent fiducial marker placement and completed CyberKnife radiation therapy in August.  A PSA has been repeated recently and is very low at 0.6.  He is tentatively cured.  He has no urinary symptoms and has been managing with tamsulosin ordered by radiation oncology.  Not sure if he has noticed a significant improvement and reports no real problems urinating other than some mild urgency and frequency, IPSS is 12.  He is curious whether he can come off the tamsulosin.      Lab Results   Component Value Date    PSA 0.635 2023    PSA 4.4 (H) 2023    PSA 3.5 2022    PSA 4.1 (H) 2022    PSA 3.710 2021    PSA 2.880 2019         Subjective      Review of System: Review of Systems   Genitourinary:  Negative for decreased urine volume, difficulty urinating, dysuria, enuresis, flank pain, frequency, hematuria and urgency.      I have reviewed the ROS documented by my clinical staff, I updated appropriately and I agree.   He will try to come off the tamsulosin and see how things go.Chi Berger MD    Past Medical History:   Past Medical History:   Diagnosis Date    Arrhythmia     Arthritis     Atrial fibrillation     Coronary artery disease     Full dentures     GERD (gastroesophageal  reflux disease)     History of prediabetes     Hyperlipidemia     Hypertension     LAUREL on CPAP     Prostate cancer     Prostate cancer 2023    Sleep apnea     Vitamin D deficiency     Wears glasses        Past Surgical History:   Past Surgical History:   Procedure Laterality Date    ANKLE SURGERY      CARDIAC ELECTROPHYSIOLOGY PROCEDURE N/A 2022    Procedure: PVA (persistent), hold Tikosyn x 5 days;  Surgeon: William Hargrove MD;  Location: [facility] EP INVASIVE LOCATION;  Service: Cardiovascular;  Laterality: N/A;    COLONOSCOPY      Complete    CYBERKNIFE  2023    Prostate    CYSTOSCOPY  Do not now    POLYPECTOMY      PROSTATE FIDUCIAL MARKER PLACEMENT  2023    STOMACH SURGERY      barretts esophagus       Family History:   Family History   Problem Relation Age of Onset    Cancer Mother     Uterine cancer Mother     No Known Problems Father     No Known Problems Sister     No Known Problems Half-Brother     Diabetes Half-Sister        Social History:   Social History     Socioeconomic History    Marital status:    Tobacco Use    Smoking status: Former     Packs/day: 2.50     Years: 15.00     Additional pack years: 0.00     Total pack years: 37.50     Types: Cigarettes     Quit date: 1987     Years since quittin.6     Passive exposure: Never    Smokeless tobacco: Never   Vaping Use    Vaping Use: Never used   Substance and Sexual Activity    Alcohol use: No    Drug use: No    Sexual activity: Defer       Medications:     Current Outpatient Medications:     amLODIPine (NORVASC) 10 MG tablet, TAKE 1 TABLET BY MOUTH EVERY DAY, Disp: 90 tablet, Rfl: 2    apixaban (Eliquis) 5 MG tablet tablet, Take 1 tablet by mouth 2 (Two) Times a Day., Disp: 180 tablet, Rfl: 3    atorvastatin (LIPITOR) 20 MG tablet, Take 1 tablet by mouth Daily., Disp: 90 tablet, Rfl: 3    Cholecalciferol (VITAMIN D-3) 1000 UNITS capsule, Take 1,000 Units by mouth Daily., Disp: , Rfl:     lisinopril  (PRINIVIL,ZESTRIL) 40 MG tablet, Take 1 tablet by mouth Daily., Disp: 90 tablet, Rfl: 3    Misc Natural Products (OSTEO BI-FLEX JOINT SHIELD PO), Take 1 tablet by mouth 2 (Two) Times a Day., Disp: , Rfl:     omeprazole (priLOSEC) 20 MG capsule, Take 1 capsule by mouth Daily., Disp: 90 capsule, Rfl: 3    sildenafil (VIAGRA) 100 MG tablet, Take 1 tablet by mouth Daily As Needed for Erectile Dysfunction., Disp: 30 tablet, Rfl: 11    tamsulosin (FLOMAX) 0.4 MG capsule 24 hr capsule, Take 1 capsule by mouth Daily., Disp: 30 capsule, Rfl: 11    Allergies:   No Known Allergies    IPSS Questionnaire (AUA-7):  Over the past month…    1)  Incomplete Emptying:       How often have you had a sensation of not emptying you had the sensation of not emptying your bladder completely after you finished urinating?  2 - Less than half the time   2)  Frequency:       How often have you had the urinate again less than two hours after you finished urinating?  2 - Less than half the time   3)  Intermittency:       How often have you found you stopped and started again several times when you urinated?   2 - Less than half the time   4) Urgency:      How often have you found it difficult to postpone urination?  3 - About half the time   5) Weak Stream:      How often have you had a weak urinary stream?  2 - Less than half the time   6) Straining:       How often have you had to push or strain to begin urination?  1 - Less than 1 time in 5   7) Nocturia:      How many times did you most typically get up to urinate from the time you went to bed at night until the time you got up in the morning?  1 - 1 time   Total Score:  12   The International Prostate Symptom Score (IPSS) is used to screen, diagnose, track symptoms of benign prostatic hyperplasia (BPH).   0-7 (Mild Symptoms) 8-19 (Moderate) 20-35 (Severe)   Quality of Life (QoL):  If you were to spend the rest of your life with your urinary condition just the way it is now, how would you  "feel about that? 2-Mostly Satisfied   Urine Leakage (Incontinence) 0-No Leakage       Sexual Health Inventory for Men (MASON)   Over the past 6 months:     1. How do you rate your confidence that you could get and keep an erection?  3 - Moderate   2. When you had erections with sexual   stimulation, how often were your erections hard enough for penetration (entering your partner)?  2 - A few times (much less than half the time)   3. During sexual intercourse, how often were you able to maintain your erection after you had penetrated (entered) your partner?  2 - A few times (much less than half the time)   4. During sexual intercourse, how difficult was it to maintain your erection to completion of intercourse?  3 - Difficult    5. When you attempted sexual intercourse, how often was it satisfactory for you?  3 - Sometimes (About half the time)     Total Score: 12   The Sexual Health Inventory for Men further classifies ED severity with the following breakpoints:   1-7 (Severe ED) 8-11 (Moderate ED) 12-16 (Mild to Moderate ED) 17-21 (Mild ED)           Objective     Physical Exam:   Vital Signs:   Vitals:    11/27/23 1427   Pulse: 86   SpO2: 98%   Weight: 115 kg (252 lb 9.6 oz)   Height: 177.8 cm (70\")   PainSc: 0-No pain     Body mass index is 36.24 kg/m².     Physical Exam  Constitutional:       Appearance: Normal appearance. He is obese.   HENT:      Head: Normocephalic and atraumatic.      Nose: Nose normal.   Eyes:      Extraocular Movements: Extraocular movements intact.      Conjunctiva/sclera: Conjunctivae normal.      Pupils: Pupils are equal, round, and reactive to light.   Musculoskeletal:         General: Normal range of motion.      Cervical back: Normal range of motion and neck supple.   Skin:     General: Skin is warm and dry.      Findings: No lesion or rash.   Neurological:      General: No focal deficit present.      Mental Status: He is alert and oriented to person, place, and time. Mental status is " at baseline.   Psychiatric:         Mood and Affect: Mood normal.         Behavior: Behavior normal.         Labs:   Lab Results   Component Value Date    PSA 0.635 11/21/2023    PSA 4.4 (H) 02/03/2023    PSA 3.5 07/27/2022       Lab Results   Component Value Date    WBC 5.9 06/20/2022    HGB 13.4 06/20/2022    HCT 41.1 06/20/2022    MCV 88 06/20/2022     06/20/2022       Lab Results   Component Value Date    GLUCOSE 108 (H) 06/20/2022    BUN 17 06/20/2022    CREATININE 1.00 03/26/2023    EGFRIFNONA 96 06/17/2021    EGFRIFAFRI 117 06/17/2021    BCR 21 06/20/2022    K 4.6 06/20/2022    CO2 23 06/20/2022    CALCIUM 9.4 06/20/2022    PROTENTOTREF 6.7 06/20/2022    ALBUMIN 4.4 06/20/2022    LABIL2 1.9 06/20/2022    AST 19 06/20/2022    ALT 15 06/20/2022       Images:   No Images in the past 120 days found..    Measures:   Tobacco:   Erik Arcos  reports that he quit smoking about 36 years ago. His smoking use included cigarettes. He has a 37.50 pack-year smoking history. He has never been exposed to tobacco smoke. He has never used smokeless tobacco.    Assessment / Plan      Assessment:   Mr. Arcos is a 73 y.o. male who presented today for follow-up regarding favorable intermediate risk grade group 2 prostate cancer involving the right prostate.  Finished CyberKnife therapy in August.  PSA has declined rapidly.  We will recheck a PSA in 6 months.  He will continue tamsulosin and he is welcome to come off the medication at his convenience to determine if any change in urination.  He does have an enlarged gland but reports stable strong stream.    Diagnoses and all orders for this visit:    1. Prostate cancer (Primary)  -     PSA Diagnostic; Future  -     PSA Diagnostic; Future    2. BPH with obstruction/lower urinary tract symptoms  -Trial off tamsulosin at patient's convenience, go back on tamsulosin if noticeable worsening of symptoms         Follow Up:   Return in about 6 months (around 5/27/2024) for  Follow Up after Labs.    I spent approximately 20 minutes providing clinical care for this patient; including review of patient's chart and provider documentation, face to face time spent with patient in examination room (obtaining history, performing physical exam, discussing diagnosis and management options), placing orders, and completing patient documentation.     Chi Berger MD  [facility]

## 2023-12-28 ENCOUNTER — OFFICE VISIT (OUTPATIENT)
Dept: INTERNAL MEDICINE | Facility: CLINIC | Age: 73
End: 2023-12-28
Payer: MEDICARE

## 2023-12-28 VITALS
TEMPERATURE: 98.2 F | OXYGEN SATURATION: 95 % | WEIGHT: 248.12 LBS | BODY MASS INDEX: 35.52 KG/M2 | DIASTOLIC BLOOD PRESSURE: 78 MMHG | HEART RATE: 72 BPM | HEIGHT: 70 IN | SYSTOLIC BLOOD PRESSURE: 127 MMHG

## 2023-12-28 DIAGNOSIS — K22.70 BARRETT'S ESOPHAGUS WITHOUT DYSPLASIA: ICD-10-CM

## 2023-12-28 DIAGNOSIS — Z23 NEED FOR INFLUENZA VACCINATION: Primary | ICD-10-CM

## 2023-12-28 DIAGNOSIS — I48.0 PAROXYSMAL ATRIAL FIBRILLATION: ICD-10-CM

## 2023-12-28 DIAGNOSIS — I10 PRIMARY HYPERTENSION: ICD-10-CM

## 2023-12-28 DIAGNOSIS — E78.5 HYPERLIPIDEMIA, UNSPECIFIED HYPERLIPIDEMIA TYPE: ICD-10-CM

## 2023-12-28 NOTE — PROGRESS NOTES
"Subjective  Erik Arcos is a 73 y.o. male    HPI coming in for follow-up patient with a history of hypertension and York's esophagus history of paroxysmal A-fib no new complaints denies chest pain palpitation lives with his wife no alcohol or tobacco use recent weight gain noted    The following portions of the patient's history were reviewed and updated as appropriate: allergies, current medications, past family history, past medical history, past social history, past surgical history, and problem list.     Review of Systems   Constitutional: Negative.  Negative for activity change, appetite change, fatigue and fever.   HENT:  Negative for congestion, ear discharge, ear pain and trouble swallowing.    Eyes:  Negative for photophobia and visual disturbance.   Respiratory:  Negative for cough and shortness of breath.    Cardiovascular:  Negative for chest pain and palpitations.   Gastrointestinal:  Negative for abdominal distention, abdominal pain, constipation, diarrhea, nausea and vomiting.   Endocrine: Negative.    Genitourinary:  Negative for dysuria, hematuria and urgency.   Musculoskeletal:  Positive for arthralgias. Negative for back pain, joint swelling and myalgias.   Skin:  Negative for color change and rash.   Allergic/Immunologic: Negative.    Neurological:  Negative for dizziness, weakness, light-headedness and headaches.   Hematological:  Negative for adenopathy. Does not bruise/bleed easily.   Psychiatric/Behavioral:  Negative for agitation, confusion and dysphoric mood. The patient is not nervous/anxious.        Visit Vitals  /78   Pulse 72   Temp 98.2 °F (36.8 °C)   Ht 177.8 cm (70\")   Wt 113 kg (248 lb 1.9 oz)   SpO2 95%   BMI 35.60 kg/m²       Objective  Physical Exam  Constitutional:       General: He is not in acute distress.     Appearance: He is well-developed.   HENT:      Nose: Nose normal.   Eyes:      General: No scleral icterus.     Conjunctiva/sclera: Conjunctivae normal. "   Neck:      Thyroid: No thyromegaly.      Trachea: No tracheal deviation.   Cardiovascular:      Rate and Rhythm: Normal rate and regular rhythm.      Heart sounds: No murmur heard.     No friction rub.   Pulmonary:      Effort: No respiratory distress.      Breath sounds: No wheezing or rales.   Abdominal:      General: There is no distension.      Palpations: Abdomen is soft. There is no mass.      Tenderness: There is no abdominal tenderness. There is no guarding.   Musculoskeletal:         General: Deformity present. Normal range of motion.   Lymphadenopathy:      Cervical: No cervical adenopathy.   Skin:     General: Skin is warm and dry.      Findings: No erythema or rash.   Neurological:      Mental Status: He is alert and oriented to person, place, and time.      Cranial Nerves: No cranial nerve deficit.      Coordination: Coordination normal.      Deep Tendon Reflexes: Reflexes are normal and symmetric.   Psychiatric:         Behavior: Behavior normal.         Thought Content: Thought content normal.         Judgment: Judgment normal.       Diagnoses and all orders for this visit:    Paroxysmal atrial fibrillation stable continue with anticoagulant therapy rate control okay    Primary hypertension discussed low-sodium diet continue amlodipine 10 mg daily along with lisinopril 40 mg daily    York's esophagus without dysplasia no new complaints following up on EGD continue with omeprazole and reflux precautions

## 2023-12-29 LAB
ALBUMIN SERPL-MCNC: 4.7 G/DL (ref 3.8–4.8)
ALBUMIN/GLOB SERPL: 2.1 {RATIO} (ref 1.2–2.2)
ALP SERPL-CCNC: 66 IU/L (ref 44–121)
ALT SERPL-CCNC: 17 IU/L (ref 0–44)
AST SERPL-CCNC: 20 IU/L (ref 0–40)
BILIRUB SERPL-MCNC: 0.6 MG/DL (ref 0–1.2)
BUN SERPL-MCNC: 12 MG/DL (ref 8–27)
BUN/CREAT SERPL: 14 (ref 10–24)
CALCIUM SERPL-MCNC: 9.6 MG/DL (ref 8.6–10.2)
CHLORIDE SERPL-SCNC: 100 MMOL/L (ref 96–106)
CO2 SERPL-SCNC: 21 MMOL/L (ref 20–29)
CREAT SERPL-MCNC: 0.88 MG/DL (ref 0.76–1.27)
EGFRCR SERPLBLD CKD-EPI 2021: 90 ML/MIN/1.73
GLOBULIN SER CALC-MCNC: 2.2 G/DL (ref 1.5–4.5)
GLUCOSE SERPL-MCNC: 104 MG/DL (ref 70–99)
LDLC SERPL DIRECT ASSAY-MCNC: 99 MG/DL (ref 0–99)
POTASSIUM SERPL-SCNC: 4.6 MMOL/L (ref 3.5–5.2)
PROT SERPL-MCNC: 6.9 G/DL (ref 6–8.5)
SODIUM SERPL-SCNC: 136 MMOL/L (ref 134–144)

## 2024-02-08 ENCOUNTER — HOSPITAL ENCOUNTER (OUTPATIENT)
Dept: RADIATION ONCOLOGY | Facility: HOSPITAL | Age: 74
Setting detail: RADIATION/ONCOLOGY SERIES
Discharge: HOME | End: 2024-02-08
Payer: COMMERCIAL

## 2024-02-26 ENCOUNTER — OFFICE VISIT (OUTPATIENT)
Dept: RADIATION ONCOLOGY | Facility: HOSPITAL | Age: 74
End: 2024-02-26
Payer: COMMERCIAL

## 2024-02-26 DIAGNOSIS — C61 PROSTATE CANCER: Primary | ICD-10-CM

## 2024-02-26 NOTE — PROGRESS NOTES
TELEMEDICINE FOLLOW UP NOTE    PATIENT:                                                      Erik Arcos  MEDICAL RECORD #:                        [id number]  :                                                          1950  COMPLETION DATE:   2023  DIAGNOSIS:     Adenocarcinoma of the prostate  -  Stage IIB (T1c N0 M0)    This visit has been converted to a telehealth virtual visit, the patient's preferred method for today's follow-up.  The patient has given verbal consent.      BRIEF HISTORY:  Erik Arcos is a very pleasant 73 y.o. male found earlier this year to have an elevated PSA of 4.4.  The patient was sent for MRI scan of his prostate 3/26/2023 which showed two PI-RADS 4 lesions, 1 of which was abutting the capsule.  Imaging otherwise showed no evidence of extraprostatic extension or concerning adenopathy.  The patient underwent a biopsy with Dr. Berger.  This revealed [place] 3+3 = 6 disease in the right base and right apex, and [place] 3+4 = 7 disease at the right mid.  The patient elected for definitive treatment with CyberKnife for his favorable intermediate risk disease.  He underwent placement of fiducial markers without issue.  He then completed CyberKnife SBRT to a dose of 35 Gray in 5 fractions on 2023.      The patient reports that he feels very well at this time point.  He is very pleased with his drop in his PSA.      IPSS Questionnaire (AUA-7):  Over the past month…    1)  Incomplete Emptying  How often have you had a sensation of not emptying your bladder?  4 - More than half the time   2)  Frequency  How often have you had to urinate less than every two hours? 3 - About half the time   3)  Intermittency  How often have you found you stopped and started again several times when you urinated?  1 - Less than 1 time in 5   4) Urgency  How often have you found it difficult to postpone urination?  3 - About half the time   5) Weak Stream  How often have you had a weak  urinary stream?  3 - About half the time   6) Straining  How often have you had to push or strain to begin urination?  1 - Less than 1 time in 5   7) Nocturia  How many times did you typically get up at night to urinate?  3 - 3 times   Total Score:  18       Quality of life due to urinary symptoms:  If you were to spend the rest of your life with your urinary condition the way it is now, how would you feel about that? 3-Mixed   Urine Leakage (Incontinence) 1-Mild (A few drops a day, no pad use)     Sexual Health Inventory  Current Status    1)  How do you rate your confidence that you could achieve and keep an erection? 3-Moderate   2) When you had erections with sexual stimulation, how often were your erections hard enough for penetration (entering your partner)? 4-Most times (much more than half the time)   3)  During sexual intercourse, how often were you able to maintain your erection after you had penetrated (entered) into your partner? 3-Sometimes (about half the time)   4) During sexual intercourse, how difficult was it to maintain your erection to completion of intercourse? 4-Slightly difficult   5) When you attempted sexual intercourse, how often was it satisfactory to you? 4-Most times (much more than half the time)   Total Score: 18       Bowel Health Inventory  Current Status: 0-No problems, no rectal bleeding, no discharge, less then 5 bowel movements a day            MEDICATIONS: Medication reconciliation for the patient was reviewed and confirmed in the electronic medical record.    Review of Systems   Constitutional:  Positive for fatigue.   Genitourinary:  Positive for frequency and nocturia.    All other systems reviewed and are negative.          KPS 90%      Physical Exam  Pulmonary:      Respirations even, unlabored. No audible wheezing or cough.  Neurological:      A+Ox4, conversant, answers questions appropriately.  Psychiatric:     Judgement, affect, and decision-making WNL.    Limited  physical exam as visit was conducted remotely via telephone.          The following portions of the patient's history were reviewed and updated as appropriate: allergies, current medications, past family history, past medical history, past social history, past surgical history and problem list.  PSA          11/21/2023    13:32   PSA   PSA 0.635             There are no diagnoses linked to this encounter.       IMPRESSION: Erik Arcos is a 73 y.o. gentleman with recent diagnosis of a clinical T1c, [place] 3+4 = 7 prostate adenocarcinoma with pre-treatment PSA of 4.4.  He is now 1 month status post CyberKnife SBRT.      The patient has had an excellent drop in his PSA.  He is continuing to follow with Dr. Berger.    Patient has no outstanding symptoms.  He is interested in discontinuing his Flomax and given instructions for that today.    I spent 20 minutes on the patient's case today.    RECOMMENDATIONS:      Adenocarcinoma of the prostate  -stage IIB (T1c N0 M0)  -PSA 4.4  -Mirna 3+4 = 7  -No evidence of extraprostatic extension on pelvis MRI  -Prostate volume 70 cc  -Status post fiducial marker placement with Dr. Berger  -Status post CyberKnife SBRT 35 Gray in 5 fractions   -completed 7/5/2023  -Follows with Dr. Berger  -telemedicine 9 months      No follow-ups on file.    Chivo Cool MD      I spent a total of 30 minutes on today's visit, with more than 16 minutes in virtual communication with the patient via telephone, and the remainder of the time spent in reviewing the relevant history, records, available imaging, and for documentation.

## 2024-04-23 RX ORDER — AMLODIPINE BESYLATE 10 MG/1
TABLET ORAL
Qty: 90 TABLET | Refills: 3 | Status: SHIPPED | OUTPATIENT
Start: 2024-04-23

## 2024-05-13 NOTE — PROGRESS NOTES
Cardiac Electrophysiology Outpatient Note  [facility] at [facility]    Office Visit     Erik Arcos  [id number]  05/14/2024    Primary Care Physician: Foster Schuster MD    Referred By: No ref. provider found    Subjective     No chief complaint on file.    Problem List:   Persistent Atrial Fibrillation   CHADSVASC: 2  Diagnosed incidentally in the setting of pre op for cataract surgery   Echo: 10/28/21: Normal LVEF, Mild concentric LVH, severe LA dilation   Stress Test 10/28/21: normal ,myocardial perfusion imaging no evidence of ischemia. There is a coronary artery calcification on the CT portion of the stress test. Findings consistent with low risk study  2/1/22 ECV with recurrence within 1 month while on Sotalol.   PVA 04/27/2022 with posterior wall isolation and CTI RFA ([place])  HTN   HLD  GERD  LAUREL, CPAP compliant  Surgical History  Ankle surgery  2010     History of Present Illness:   Erik Arcos is a 73 y.o. male who presents to my electrophysiology clinic for follow up of persistent atrial fibrillation s/p ablation for this in April 2022. This consisted of pulmonary vein isolation, posterior wall isolation, and CTI ablation. Since we last saw the patient in May of 2023, he reports he has been doing well.  He denies any chest pain, palpitations, shortness of breath, lower extremity edema or lightheadedness/dizziness.  He still works as a  and has no physical limitations with this.  He is unsure where his blood pressures have been running recently.    Past Medical History:   Diagnosis Date    Arrhythmia     Arthritis     Atrial fibrillation     Coronary artery disease     Full dentures     GERD (gastroesophageal reflux disease)     History of prediabetes     Hyperlipidemia     Hypertension     LAUREL on CPAP     Prostate cancer     Prostate cancer 04/27/2023    Sleep apnea     Vitamin D deficiency     Wears glasses        Past Surgical History:   Procedure  Laterality Date    ANKLE SURGERY      CARDIAC ELECTROPHYSIOLOGY PROCEDURE N/A 2022    Procedure: PVA (persistent), hold Tikosyn x 5 days;  Surgeon: William Hargrove MD;  Location: [facility] INVASIVE LOC[facility];  Service: Cardiovascular;  Laterality: N/A;    COLONOSCOPY      Complete    CYBERKNIFE  2023    Prostate    CYSTOSCOPY  Do not now    POLYPECTOMY      PROSTATE FIDUCIAL MARKER PLACEMENT  2023    STOMACH SURGERY      barretts esophagus       Family History   Problem Relation Age of Onset    Cancer Mother     Uterine cancer Mother     No Known Problems Father     No Known Problems Sister     No Known Problems Half-Brother     Diabetes Half-Sister        Social History     Socioeconomic History    Marital status:    Tobacco Use    Smoking status: Former     Current packs/day: 0.00     Average packs/day: 2.5 packs/day for 15.0 years (37.5 ttl pk-yrs)     Types: Cigarettes     Start date: 1972     Quit date: 1987     Years since quittin.1     Passive exposure: Never    Smokeless tobacco: Never   Vaping Use    Vaping status: Never Used   Substance and Sexual Activity    Alcohol use: No    Drug use: No    Sexual activity: Defer         Current Outpatient Medications:     amLODIPine (NORVASC) 10 MG tablet, Take 1 tablet by mouth Daily., Disp: 90 tablet, Rfl: 3    apixaban (Eliquis) 5 MG tablet tablet, Take 1 tablet by mouth 2 (Two) Times a Day., Disp: 180 tablet, Rfl: 3    atorvastatin (LIPITOR) 20 MG tablet, Take 1 tablet by mouth Daily., Disp: 90 tablet, Rfl: 3    Cholecalciferol (VITAMIN D-3) 1000 UNITS capsule, Take 1,000 Units by mouth Daily., Disp: , Rfl:     lisinopril (PRINIVIL,ZESTRIL) 40 MG tablet, Take 1 tablet by mouth Daily., Disp: 90 tablet, Rfl: 3    Misc Natural Products (OSTEO BI-FLEX JOINT SHIELD PO), Take 1 tablet by mouth 2 (Two) Times a Day., Disp: , Rfl:     omeprazole (priLOSEC) 20 MG capsule, Take 1 capsule by mouth Daily., Disp: 90 capsule, Rfl: 3     "sildenafil (VIAGRA) 100 MG tablet, Take 1 tablet by mouth Daily As Needed for Erectile Dysfunction., Disp: 30 tablet, Rfl: 11    tamsulosin (FLOMAX) 0.4 MG capsule 24 hr capsule, Take 1 capsule by mouth Daily., Disp: 30 capsule, Rfl: 11    hydroCHLOROthiazide 25 MG tablet, Take 1 tablet by mouth Daily., Disp: 90 tablet, Rfl: 3    Allergies:   No Known Allergies    Objective   Vital Signs: Blood pressure 140/82, pulse 82, height 177.8 cm (70\"), weight 114 kg (251 lb), SpO2 96%.    PHYSICAL EXAM  General appearance: Awake, alert, cooperative  Head: Normocephalic, without obvious abnormality, atraumatic  Lungs: Clear to ascultation bilaterally  Heart: Regular rate and rhythm, no murmurs, 2+ LE pulses, no lower extremity swelling  Skin: Skin color, turgor normal, no rashes or lesions  Neurologic: Grossly normal     Lab Results   Component Value Date    GLUCOSE 104 (H) 12/28/2023    CALCIUM 9.6 12/28/2023     12/28/2023    K 4.6 12/28/2023    CO2 21 12/28/2023     12/28/2023    BUN 12 12/28/2023    CREATININE 0.88 12/28/2023    EGFRIFAFRI 117 06/17/2021    EGFRIFNONA 96 06/17/2021    BCR 14 12/28/2023    ANIONGAP 10.0 04/25/2022     Lab Results   Component Value Date    WBC 5.9 06/20/2022    HGB 13.4 06/20/2022    HCT 41.1 06/20/2022    MCV 88 06/20/2022     06/20/2022     Lab Results   Component Value Date    INR 1.11 04/25/2022    INR 1.03 04/19/2022    PROTIME 14.3 04/25/2022    PROTIME 13.4 04/19/2022     Lab Results   Component Value Date    TSH 2.890 04/25/2022          Results for orders placed during the hospital encounter of 10/28/21    Adult Transthoracic Echo Complete W/ Cont if Necessary Per Protocol    Interpretation Summary  · Left ventricular wall thickness is consistent with mild concentric hypertrophy.  · The right ventricular cavity is mildly dilated.  · Left atrial volume is severely increased.  · The right atrial cavity is moderately dilated.  · The underlying rhythm appears to be " atrial fibrillation  · The left ventricular ejection fraction is 60%         I personally viewed and interpreted the patient's EKG/Telemetry/lab data      ECG 12 Lead    Date/Time: 5/17/2024 11:28 AM  Performed by: Zaki Mayes PA-C    Authorized by: Zaki Mayes PA-C  Comparison: compared with previous ECG from 11/4/2022  Similar to previous ECG  Comparison to previous ECG: Occasional PVCs are now present  Rhythm: sinus rhythm  Ectopy: multifocal PVCs  Rate: normal  BPM: 82  Other findings: non-specific ST-T wave changes    Clinical impression: non-specific ECG          Erik Arcos  reports that he quit smoking about 37 years ago. His smoking use included cigarettes. He started smoking about 52 years ago. He has a 37.5 pack-year smoking history. He has never been exposed to tobacco smoke. He has never used smokeless tobacco. I       Advance Care Planning   Advance Care Planning: ACP discussion was declined by the patient. Patient does not have an advance directive, declines further assistance.     Assessment & Plan    1. Persistent atrial fibrillation  He underwent ablation for this by Dr. Hargrove in April 2022 consisting of pulmonary vein isolation, posterior wall isolation, and CTI ablation.  He has overall done quite well since then without symptomatic recurrence of atrial fibrillation.  He is in sinus rhythm on EKG today.  Will continue Eliquis for anticoagulation.    2. Primary hypertension  BP elevated at 140/82 in office today.  He has not kept a close eye on his blood pressures recently.  Will add HCTZ 25 mg daily with follow-up BMP in 7 to 10 days.  He will log his blood pressures and further discuss BP management with his PCP in June.  - Basic Metabolic Panel; Future    Follow Up:  Return in about 1 year (around 5/14/2025).      Thank you for allowing me to participate in the care of your patient. Please do not hesitate to contact me with additional questions or concerns.      Zaki Mayes,  CHRISTOPHER  Cardiac Electrophysiology  [facility] / [facility]

## 2024-05-14 ENCOUNTER — OFFICE VISIT (OUTPATIENT)
Dept: CARDIOLOGY | Facility: CLINIC | Age: 74
End: 2024-05-14
Payer: MEDICARE

## 2024-05-14 VITALS
HEART RATE: 82 BPM | BODY MASS INDEX: 35.93 KG/M2 | DIASTOLIC BLOOD PRESSURE: 82 MMHG | OXYGEN SATURATION: 96 % | HEIGHT: 70 IN | SYSTOLIC BLOOD PRESSURE: 140 MMHG | WEIGHT: 251 LBS

## 2024-05-14 DIAGNOSIS — I48.19 PERSISTENT ATRIAL FIBRILLATION: Primary | ICD-10-CM

## 2024-05-14 DIAGNOSIS — I10 PRIMARY HYPERTENSION: ICD-10-CM

## 2024-05-14 PROCEDURE — 3079F DIAST BP 80-89 MM HG: CPT

## 2024-05-14 PROCEDURE — 1159F MED LIST DOCD IN RCRD: CPT

## 2024-05-14 PROCEDURE — 3077F SYST BP >= 140 MM HG: CPT

## 2024-05-14 PROCEDURE — 1160F RVW MEDS BY RX/DR IN RCRD: CPT

## 2024-05-14 PROCEDURE — 99213 OFFICE O/P EST LOW 20 MIN: CPT

## 2024-05-14 RX ORDER — HYDROCHLOROTHIAZIDE 25 MG/1
25 TABLET ORAL DAILY
Qty: 90 TABLET | Refills: 3 | Status: SHIPPED | OUTPATIENT
Start: 2024-05-14

## 2024-05-14 RX ORDER — AMLODIPINE BESYLATE 10 MG/1
10 TABLET ORAL DAILY
Qty: 90 TABLET | Refills: 3 | Status: SHIPPED | OUTPATIENT
Start: 2024-05-14

## 2024-05-17 PROBLEM — I48.0 PAROXYSMAL ATRIAL FIBRILLATION: Status: RESOLVED | Noted: 2022-12-20 | Resolved: 2024-05-17

## 2024-05-17 PROCEDURE — 93000 ELECTROCARDIOGRAM COMPLETE: CPT

## 2024-05-21 ENCOUNTER — LAB (OUTPATIENT)
Dept: LAB | Facility: HOSPITAL | Age: 74
End: 2024-05-21
Payer: MEDICARE

## 2024-05-21 DIAGNOSIS — I10 PRIMARY HYPERTENSION: ICD-10-CM

## 2024-05-21 DIAGNOSIS — C61 PROSTATE CANCER: ICD-10-CM

## 2024-05-21 LAB — PSA SERPL-MCNC: 0.26 NG/ML (ref 0–4)

## 2024-05-21 PROCEDURE — 80048 BASIC METABOLIC PNL TOTAL CA: CPT

## 2024-05-21 PROCEDURE — 36415 COLL VENOUS BLD VENIPUNCTURE: CPT

## 2024-05-21 PROCEDURE — 84153 ASSAY OF PSA TOTAL: CPT

## 2024-05-22 LAB
ANION GAP SERPL CALCULATED.3IONS-SCNC: 13 MMOL/L (ref 5–15)
BUN SERPL-MCNC: 12 MG/DL (ref 8–23)
BUN/CREAT SERPL: 14.1 (ref 7–25)
CALCIUM SPEC-SCNC: 9.3 MG/DL (ref 8.6–10.5)
CHLORIDE SERPL-SCNC: 95 MMOL/L (ref 98–107)
CO2 SERPL-SCNC: 23 MMOL/L (ref 22–29)
CREAT SERPL-MCNC: 0.85 MG/DL (ref 0.76–1.27)
EGFRCR SERPLBLD CKD-EPI 2021: 90.6 ML/MIN/1.73
GLUCOSE SERPL-MCNC: 115 MG/DL (ref 65–99)
POTASSIUM SERPL-SCNC: 4.5 MMOL/L (ref 3.5–5.2)
SODIUM SERPL-SCNC: 131 MMOL/L (ref 136–145)

## 2024-05-23 ENCOUNTER — TELEPHONE (OUTPATIENT)
Dept: CARDIOLOGY | Facility: CLINIC | Age: 74
End: 2024-05-23
Payer: MEDICARE

## 2024-05-23 DIAGNOSIS — I10 PRIMARY HYPERTENSION: Primary | ICD-10-CM

## 2024-05-23 DIAGNOSIS — I48.19 PERSISTENT ATRIAL FIBRILLATION: ICD-10-CM

## 2024-05-23 NOTE — TELEPHONE ENCOUNTER
Zaki Mayes PA-C Childers, Tracy R, RN  Patient's kidney function looks good on bloodwork. We started him on HCTZ and his sodium was just a tad low. I want to get a follow up BMP on him around 5/30 and if his sodium is any lower we might need to try a different medication for his blood pressure likely a beta blocker. Can you let him know and put order for BMP in for me?

## 2024-05-23 NOTE — TELEPHONE ENCOUNTER
Patient's wife notified and aware. Order placed for f/u BMP. He is going to have it done at  in [place].

## 2024-05-29 ENCOUNTER — OFFICE VISIT (OUTPATIENT)
Dept: UROLOGY | Facility: CLINIC | Age: 74
End: 2024-05-29
Payer: MEDICARE

## 2024-05-29 VITALS
WEIGHT: 251 LBS | HEART RATE: 84 BPM | BODY MASS INDEX: 35.93 KG/M2 | SYSTOLIC BLOOD PRESSURE: 149 MMHG | OXYGEN SATURATION: 97 % | DIASTOLIC BLOOD PRESSURE: 74 MMHG | HEIGHT: 70 IN

## 2024-05-29 DIAGNOSIS — N52.01 ERECTILE DYSFUNCTION DUE TO ARTERIAL INSUFFICIENCY: ICD-10-CM

## 2024-05-29 DIAGNOSIS — N52.9 ERECTILE DYSFUNCTION, UNSPECIFIED ERECTILE DYSFUNCTION TYPE: ICD-10-CM

## 2024-05-29 DIAGNOSIS — C61 PROSTATE CANCER: Primary | ICD-10-CM

## 2024-05-29 DIAGNOSIS — N13.8 BPH WITH OBSTRUCTION/LOWER URINARY TRACT SYMPTOMS: ICD-10-CM

## 2024-05-29 DIAGNOSIS — N40.1 BPH WITH OBSTRUCTION/LOWER URINARY TRACT SYMPTOMS: ICD-10-CM

## 2024-05-29 RX ORDER — TAMSULOSIN HYDROCHLORIDE 0.4 MG/1
1 CAPSULE ORAL DAILY
Qty: 90 CAPSULE | Refills: 3 | Status: SHIPPED | OUTPATIENT
Start: 2024-05-29

## 2024-05-29 RX ORDER — SILDENAFIL 100 MG/1
100 TABLET, FILM COATED ORAL DAILY PRN
Qty: 30 TABLET | Refills: 11 | Status: SHIPPED | OUTPATIENT
Start: 2024-05-29

## 2024-05-29 NOTE — PROGRESS NOTES
Follow Up Office Visit      Patient Name: Erik Arcos  : 1950   MRN: [id number]     Chief Complaint:    Chief Complaint   Patient presents with    Prostate Cancer       Referring Provider: No ref. provider found    History of Present Illness: Erik Arcos is a 73 y.o. male who presents today for follow up.  Patient has a history of low-volume favorable intermediate risk prostate cancer in addition to BPH, prostate volume was 70 g and MRI prostate demonstrated a PI-RADS 4 lesion bilaterally.  Prostate biopsy 2023 demonstrated low-volume grade group 2 and grade group 1 disease primarily involving the right prostate.  Left prostate was benign.  He underwent fiducial marker placement and completed CyberKnife radiation therapy in .  His initial post-radiation PSA was 0.6 and he returns today with repeat PSA of 0.2ng/ml.   He has no urinary symptoms and has been managing with tamsulosin ordered by radiation oncology.  He believes he had some improvement on this medication. He reports no real problems urinating other than some mild urgency and frequency, IPSS is 11.  He has stable ED on sildenafil.     Subjective      Review of System: Review of Systems   Genitourinary:  Positive for frequency.      I have reviewed the ROS documented by my clinical staff, I have updated appropriately and I agree. Chi Berger MD    I have reviewed and the following portions of the patient's history were updated as appropriate: past family history, past medical history, past social history, past surgical history and problem list.    Medications:     Current Outpatient Medications:     amLODIPine (NORVASC) 10 MG tablet, Take 1 tablet by mouth Daily., Disp: 90 tablet, Rfl: 3    apixaban (Eliquis) 5 MG tablet tablet, Take 1 tablet by mouth 2 (Two) Times a Day., Disp: 180 tablet, Rfl: 3    atorvastatin (LIPITOR) 20 MG tablet, Take 1 tablet by mouth Daily., Disp: 90 tablet, Rfl: 3    Cholecalciferol  (VITAMIN D-3) 1000 UNITS capsule, Take 1,000 Units by mouth Daily., Disp: , Rfl:     hydroCHLOROthiazide 25 MG tablet, Take 1 tablet by mouth Daily., Disp: 90 tablet, Rfl: 3    lisinopril (PRINIVIL,ZESTRIL) 40 MG tablet, Take 1 tablet by mouth Daily., Disp: 90 tablet, Rfl: 3    Misc Natural Products (OSTEO BI-FLEX JOINT SHIELD PO), Take 1 tablet by mouth 2 (Two) Times a Day., Disp: , Rfl:     omeprazole (priLOSEC) 20 MG capsule, Take 1 capsule by mouth Daily., Disp: 90 capsule, Rfl: 3    sildenafil (VIAGRA) 100 MG tablet, Take 1 tablet by mouth Daily As Needed for Erectile Dysfunction., Disp: 30 tablet, Rfl: 11    tamsulosin (FLOMAX) 0.4 MG capsule 24 hr capsule, Take 1 capsule by mouth Daily., Disp: 90 capsule, Rfl: 3    Allergies:   No Known Allergies    IPSS Questionnaire (AUA-7):  Over the past month…    1)  Incomplete Emptying:       How often have you had a sensation of not emptying you had the sensation of not emptying your bladder completely after you finished urinating?  2 - Less than half the time   2)  Frequency:       How often have you had the urinate again less than two hours after you finished urinating?  1 - Less than 1 time in 5   3)  Intermittency:       How often have you found you stopped and started again several times when you urinated?   1 - Less than 1 time in 5   4) Urgency:      How often have you found it difficult to postpone urination?  3 - About half the time   5) Weak Stream:      How often have you had a weak urinary stream?  1 - Less than 1 time in 5   6) Straining:       How often have you had to push or strain to begin urination?  1 - Less than 1 time in 5   7) Nocturia:      How many times did you most typically get up to urinate from the time you went to bed at night until the time you got up in the morning?  2 - 2 times   Total Score:  11   The International Prostate Symptom Score (IPSS) is used to screen, diagnose, track symptoms of benign prostatic hyperplasia (BPH).   0-7  "(Mild Symptoms) 8-19 (Moderate) 20-35 (Severe)   Quality of Life (QoL):  If you were to spend the rest of your life with your urinary condition just the way it is now, how would you feel about that? 2-Mostly Satisfied   Urine Leakage (Incontinence) 0-No Leakage       Objective     Physical Exam:   Vital Signs:   Vitals:    05/29/24 1441   BP: 149/74   Pulse: 84   SpO2: 97%   Weight: 114 kg (251 lb)   Height: 177.8 cm (70\")     Body mass index is 36.01 kg/m².     Physical Exam  Constitutional:       Appearance: Normal appearance.   HENT:      Head: Normocephalic and atraumatic.      Nose: Nose normal.   Eyes:      Extraocular Movements: Extraocular movements intact.      Conjunctiva/sclera: Conjunctivae normal.      Pupils: Pupils are equal, round, and reactive to light.   Musculoskeletal:         General: Normal range of motion.      Cervical back: Normal range of motion and neck supple.   Skin:     General: Skin is warm and dry.      Findings: No lesion or rash.   Neurological:      General: No focal deficit present.      Mental Status: He is alert and oriented to person, place, and time. Mental status is at baseline.   Psychiatric:         Mood and Affect: Mood normal.         Behavior: Behavior normal.            Labs:   Brief Urine Lab Results       None                 Lab Results   Component Value Date    GLUCOSE 115 (H) 05/21/2024    CALCIUM 9.3 05/21/2024     (L) 05/21/2024    K 4.5 05/21/2024    CO2 23.0 05/21/2024    CL 95 (L) 05/21/2024    BUN 12 05/21/2024    CREATININE 0.85 05/21/2024    EGFRIFAFRI 117 06/17/2021    EGFRIFNONA 96 06/17/2021    BCR 14.1 05/21/2024    ANIONGAP 13.0 05/21/2024       Lab Results   Component Value Date    WBC 5.9 06/20/2022    HGB 13.4 06/20/2022    HCT 41.1 06/20/2022    MCV 88 06/20/2022     06/20/2022       Images:   No Images in the past 120 days found..    Measures:   Tobacco:   Erik Arcos  reports that he quit smoking about 37 years ago. His smoking " use included cigarettes. He started smoking about 52 years ago. He has a 37.5 pack-year smoking history. He has never been exposed to tobacco smoke. He has never used smokeless tobacco. I have educated him on the risk of diseases from using tobacco products such as cancer, COPD, and heart disease.       Urine Incontinence: ( NOUI)      Assessment / Plan      Assessment/Plan:   Mr. Arcos is a 73 y.o. male who presented today for follow-up regarding favorable intermediate risk grade group 2 prostate cancer involving the right prostate.  Finished CyberKnife therapy in 8/23.  PSA has declined rapidly.  We will recheck a PSA in 6 months.  He has minimally bothersome LUTS on tamsulosin, despite his large gland size. He will continue tamsulosin and we will refill this today as well as sildenafil at his request.    -refill sildenafil and tamsulosin  -RTC in 6 months with repeat PSA    Diagnoses and all orders for this visit:    1. Prostate cancer (Primary)  -     PSA Total+% Free (Serial); Future    2. BPH with obstruction/lower urinary tract symptoms  -     tamsulosin (FLOMAX) 0.4 MG capsule 24 hr capsule; Take 1 capsule by mouth Daily.  Dispense: 90 capsule; Refill: 3    3. Erectile dysfunction due to arterial insufficiency    4. Erectile dysfunction, unspecified erectile dysfunction type  -     sildenafil (VIAGRA) 100 MG tablet; Take 1 tablet by mouth Daily As Needed for Erectile Dysfunction.  Dispense: 30 tablet; Refill: 11           Follow Up:   Return in about 6 months (around 11/29/2024) for Follow Up after Labs.    I spent approximately 30 minutes providing clinical care for this patient; including review of patient's chart and provider documentation, face to face time spent with patient in examination room (obtaining history, performing physical exam, discussing diagnosis and management options), placing orders, and completing patient documentation.     Chi Berger MD  [facility] [place]

## 2024-06-07 DIAGNOSIS — I48.19 PERSISTENT ATRIAL FIBRILLATION: ICD-10-CM

## 2024-06-07 RX ORDER — APIXABAN 5 MG/1
5 TABLET, FILM COATED ORAL 2 TIMES DAILY
Qty: 180 TABLET | Refills: 3 | Status: SHIPPED | OUTPATIENT
Start: 2024-06-07

## 2024-06-24 ENCOUNTER — OFFICE VISIT (OUTPATIENT)
Dept: INTERNAL MEDICINE | Facility: CLINIC | Age: 74
End: 2024-06-24
Payer: MEDICARE

## 2024-06-24 VITALS
HEART RATE: 75 BPM | TEMPERATURE: 97.7 F | WEIGHT: 245 LBS | DIASTOLIC BLOOD PRESSURE: 63 MMHG | RESPIRATION RATE: 18 BRPM | SYSTOLIC BLOOD PRESSURE: 102 MMHG | BODY MASS INDEX: 35.07 KG/M2 | OXYGEN SATURATION: 99 % | HEIGHT: 70 IN

## 2024-06-24 DIAGNOSIS — Z23 NEED FOR VACCINATION: Primary | ICD-10-CM

## 2024-06-24 DIAGNOSIS — S46.212A BICEPS RUPTURE, DISTAL, LEFT, INITIAL ENCOUNTER: ICD-10-CM

## 2024-06-24 DIAGNOSIS — G47.33 OSA (OBSTRUCTIVE SLEEP APNEA): ICD-10-CM

## 2024-06-24 DIAGNOSIS — I48.19 PERSISTENT ATRIAL FIBRILLATION: ICD-10-CM

## 2024-06-24 DIAGNOSIS — I10 PRIMARY HYPERTENSION: ICD-10-CM

## 2024-06-24 PROCEDURE — 3078F DIAST BP <80 MM HG: CPT | Performed by: INTERNAL MEDICINE

## 2024-06-24 PROCEDURE — 90677 PCV20 VACCINE IM: CPT | Performed by: INTERNAL MEDICINE

## 2024-06-24 PROCEDURE — 99213 OFFICE O/P EST LOW 20 MIN: CPT | Performed by: INTERNAL MEDICINE

## 2024-06-24 PROCEDURE — 1160F RVW MEDS BY RX/DR IN RCRD: CPT | Performed by: INTERNAL MEDICINE

## 2024-06-24 PROCEDURE — 1170F FXNL STATUS ASSESSED: CPT | Performed by: INTERNAL MEDICINE

## 2024-06-24 PROCEDURE — G0009 ADMIN PNEUMOCOCCAL VACCINE: HCPCS | Performed by: INTERNAL MEDICINE

## 2024-06-24 PROCEDURE — 1126F AMNT PAIN NOTED NONE PRSNT: CPT | Performed by: INTERNAL MEDICINE

## 2024-06-24 PROCEDURE — G0439 PPPS, SUBSEQ VISIT: HCPCS | Performed by: INTERNAL MEDICINE

## 2024-06-24 PROCEDURE — 1159F MED LIST DOCD IN RCRD: CPT | Performed by: INTERNAL MEDICINE

## 2024-06-24 PROCEDURE — 3074F SYST BP LT 130 MM HG: CPT | Performed by: INTERNAL MEDICINE

## 2024-06-24 RX ORDER — HYDROCHLOROTHIAZIDE 12.5 MG/1
12.5 TABLET ORAL DAILY
Qty: 90 TABLET | Refills: 3 | Status: SHIPPED | OUTPATIENT
Start: 2024-06-24

## 2024-06-24 NOTE — PROGRESS NOTES
The ABCs of the Annual Wellness Visit  Subsequent Medicare Wellness Visit    Subjective    Erik Arcos is a 73 y.o. male who presents for a Subsequent Medicare Wellness Visit.    The following portions of the patient's history were reviewed and   updated as appropriate: allergies, current medications, past family history, past medical history, past social history, past surgical history, and problem list.    Compared to one year ago, the patient feels his physical   health is the same.    Compared to one year ago, the patient feels his mental   health is the same.    Recent Hospitalizations:  He was not admitted to the hospital during the last year.       Current Medical Providers:  Patient Care Team:  Foster Schuster MD as PCP - General (Internal Medicine)  Pastor Chang MD as Consulting Physician (General Surgery)  William Hargrove MD as Consulting Physician (Cardiology)  Eddie Encarnacion PA as Physician Assistant (Cardiology)  Jake Laura MD as Consulting Physician (Pulmonary Disease)  Alex Manuel MD as Consulting Physician (Ophthalmology)  Shirley Mcguier OD (Optometry)  Chi Berger MD as Consulting Physician (Urology)    Outpatient Medications Prior to Visit   Medication Sig Dispense Refill    amLODIPine (NORVASC) 10 MG tablet Take 1 tablet by mouth Daily. 90 tablet 3    atorvastatin (LIPITOR) 20 MG tablet Take 1 tablet by mouth Daily. 90 tablet 3    Cholecalciferol (VITAMIN D-3) 1000 UNITS capsule Take 1,000 Units by mouth Daily.      Eliquis 5 MG tablet tablet TAKE 1 TABLET TWICE A  tablet 3    lisinopril (PRINIVIL,ZESTRIL) 40 MG tablet Take 1 tablet by mouth Daily. 90 tablet 3    Misc Natural Products (OSTEO BI-FLEX JOINT SHIELD PO) Take 1 tablet by mouth 2 (Two) Times a Day.      omeprazole (priLOSEC) 20 MG capsule Take 1 capsule by mouth Daily. 90 capsule 3    sildenafil (VIAGRA) 100 MG tablet Take 1 tablet by mouth Daily As Needed for Erectile Dysfunction. 30  "tablet 11    tamsulosin (FLOMAX) 0.4 MG capsule 24 hr capsule Take 1 capsule by mouth Daily. 90 capsule 3    hydroCHLOROthiazide 25 MG tablet Take 1 tablet by mouth Daily. 90 tablet 3     No facility-administered medications prior to visit.       No opioid medication identified on active medication list. I have reviewed chart for other potential  high risk medication/s and harmful drug interactions in the elderly.        Aspirin is not on active medication list.  Aspirin use is not indicated based on review of current medical condition/s. Risk of harm outweighs potential benefits.  .    Patient Active Problem List   Diagnosis    York's esophagus    Esophageal reflux    Hyperlipidemia    Primary hypertension    Male erectile disorder    Persistent atrial fibrillation    LAUREL (obstructive sleep apnea)    Class 1 obesity due to excess calories with serious comorbidity and body mass index (BMI) of 34.0 to 34.9 in adult    Prostate cancer     Advance Care Planning   Advance Care Planning     Advance Directive is not on file.  ACP discussion was held with the patient during this visit. Patient does not have an advance directive, declines further assistance.     Objective    Vitals:    06/24/24 1511   BP: 102/63   Pulse: 75   Resp: 18   Temp: 97.7 °F (36.5 °C)   SpO2: 99%   Weight: 111 kg (245 lb)   Height: 177.8 cm (70\")   PainSc: 0-No pain     Estimated body mass index is 35.15 kg/m² as calculated from the following:    Height as of this encounter: 177.8 cm (70\").    Weight as of this encounter: 111 kg (245 lb).    Class 2 Severe Obesity (BMI >=35 and <=39.9). Obesity-related health conditions include the following: obstructive sleep apnea, hypertension, and osteoarthritis. Obesity is improving with lifestyle modifications. BMI is is above average; BMI management plan is completed. We discussed low calorie, low carb based diet program, portion control, and increasing exercise.      Does the patient have evidence of " cognitive impairment? No          HEALTH RISK ASSESSMENT    Smoking Status:  Social History     Tobacco Use   Smoking Status Former    Current packs/day: 0.00    Average packs/day: 2.5 packs/day for 15.0 years (37.5 ttl pk-yrs)    Types: Cigarettes    Start date: 1972    Quit date: 1987    Years since quittin.2    Passive exposure: Never   Smokeless Tobacco Never     Alcohol Consumption:  Social History     Substance and Sexual Activity   Alcohol Use No     Fall Risk Screen:    SALOMONADI Fall Risk Assessment was completed, and patient is at LOW risk for falls.Assessment completed on:2024    Depression Screenin/24/2024     3:19 PM   PHQ-2/PHQ-9 Depression Screening   Little Interest or Pleasure in Doing Things 0-->not at all   Feeling Down, Depressed or Hopeless 0-->not at all   PHQ-9: Brief Depression Severity Measure Score 0       Health Habits and Functional and Cognitive Screenin/24/2024     3:17 PM   Functional & Cognitive Status   Do you have difficulty preparing food and eating? No   Do you have difficulty bathing yourself, getting dressed or grooming yourself? No   Do you have difficulty using the toilet? No   Do you have difficulty moving around from place to place? No   Do you have trouble with steps or getting out of a bed or a chair? No   Current Diet Well Balanced Diet   Dental Exam Up to date   Eye Exam Not up to date   Current Exercises Include House Cleaning;Other;Gardening;Yard Work;Walking   Do you need help using the phone?  No   Are you deaf or do you have serious difficulty hearing?  No   Do you need help to go to places out of walking distance? No   Do you need help shopping? No   Do you need help preparing meals?  No   Do you need help with housework?  No   Do you need help with laundry? No   Do you need help taking your medications? No   Do you need help managing money? No   Do you ever drive or ride in a car without wearing a seat belt? No   Have you felt  unusual stress, anger or loneliness in the last month? No   Who do you live with? Spouse   If you need help, do you have trouble finding someone available to you? No   Have you been bothered in the last four weeks by sexual problems? No   Do you have difficulty concentrating, remembering or making decisions? No       Age-appropriate Screening Schedule:  Refer to the list below for future screening recommendations based on patient's age, sex and/or medical conditions. Orders for these recommended tests are listed in the plan section. The patient has been provided with a written plan.    Health Maintenance   Topic Date Due    ZOSTER VACCINE (1 of 2) Never done    RSV Vaccine - Adults (1 - 1-dose 60+ series) Never done    COVID-19 Vaccine (6 - 2023-24 season) 11/24/2023    TDAP/TD VACCINES (2 - Td or Tdap) 12/31/2023    ANNUAL WELLNESS VISIT  06/22/2024    BMI FOLLOWUP  06/22/2024    LIPID PANEL  12/28/2024    HEPATITIS C SCREENING  Completed    Pneumococcal Vaccine 65+  Completed    AAA SCREEN (ONE-TIME)  Completed    INFLUENZA VACCINE  Discontinued                  CMS Preventative Services Quick Reference  Risk Factors Identified During Encounter  Chronic Pain: Natural history and expected course discussed. Questions answered.  Polypharmacy: Medication List reviewed and Medications are appropriate for patient  The above risks/problems have been discussed with the patient.  Pertinent information has been shared with the patient in the After Visit Summary.  An After Visit Summary and PPPS were made available to the patient.    Follow Up:   Next Medicare Wellness visit to be scheduled in 1 year.       Additional E&M Note during same encounter follows:  Patient has multiple medical problems which are significant and separately identifiable that require additional work above and beyond the Medicare Wellness Visit.      Chief Complaint  Medicare Wellness-subsequent and pulled muscle (Left arm, 7/8/2024 helping lift  "wife)    Subjective        HPI  Erik Arcos is also being seen today for lt upper arm deformity after  lifting. No pain    Review of Systems   Constitutional:  Negative for activity change, appetite change, fatigue and fever.   HENT:  Negative for congestion, ear discharge, ear pain and trouble swallowing.    Eyes:  Negative for photophobia and visual disturbance.   Respiratory:  Negative for cough and shortness of breath.    Cardiovascular:  Negative for chest pain and palpitations.   Gastrointestinal:  Negative for abdominal distention, constipation, diarrhea, nausea and vomiting.   Genitourinary:  Negative for dysuria, hematuria and urgency.   Musculoskeletal:  Positive for arthralgias and back pain. Negative for joint swelling and myalgias.   Skin:  Negative for color change and rash.   Neurological:  Negative for dizziness, weakness, light-headedness and confusion.   Hematological:  Negative for adenopathy. Does not bruise/bleed easily.   Psychiatric/Behavioral:  Negative for agitation and dysphoric mood. The patient is not nervous/anxious.        Objective   Vital Signs:  /63   Pulse 75   Temp 97.7 °F (36.5 °C)   Resp 18   Ht 177.8 cm (70\")   Wt 111 kg (245 lb)   SpO2 99%   BMI 35.15 kg/m²     Physical Exam  Constitutional:       General: He is not in acute distress.     Appearance: He is well-developed.   HENT:      Nose: Nose normal.   Eyes:      General: No scleral icterus.     Conjunctiva/sclera: Conjunctivae normal.   Neck:      Thyroid: No thyromegaly.      Trachea: No tracheal deviation.   Cardiovascular:      Rate and Rhythm: Normal rate and regular rhythm.      Heart sounds: No murmur heard.     No friction rub.   Pulmonary:      Effort: No respiratory distress.      Breath sounds: No wheezing or rales.   Abdominal:      General: There is no distension.      Palpations: Abdomen is soft. There is no mass.      Tenderness: There is no abdominal tenderness. There is no guarding. "   Musculoskeletal:         General: Swelling and deformity present. No tenderness. Normal range of motion.   Lymphadenopathy:      Cervical: No cervical adenopathy.   Skin:     General: Skin is warm and dry.      Findings: No erythema or rash.   Neurological:      Mental Status: He is alert and oriented to person, place, and time.      Cranial Nerves: No cranial nerve deficit.      Coordination: Coordination normal.      Deep Tendon Reflexes: Reflexes are normal and symmetric.   Psychiatric:         Behavior: Behavior normal.         Thought Content: Thought content normal.         Judgment: Judgment normal.                         Assessment and Plan   Diagnoses and all orders for this visit:    1. Need for vaccination (Primary)  -     Pneumococcal Conjugate Vaccine 20-Valent (PCV20)    2. Biceps rupture, distal, left, initial encounter without any appreciable loss of strength.  Discussed with patient given his age and no noticeable loss of strength would not pursue surgical option to reattach the tendon.  Will hold off on orthopedic referral    3. Persistent atrial fibrillation stable rate control okay continue with Eliquis    4. Primary hypertension stable with current meds and low-salt diet    5. LAUREL (obstructive sleep apnea) compliant with CPAP use denies daytime somnolence    Other orders  -     hydroCHLOROthiazide 12.5 MG tablet; Take 1 tablet by mouth Daily.  Dispense: 90 tablet; Refill: 3             Follow Up   No follow-ups on file.  Patient was given instructions and counseling regarding his condition or for health maintenance advice. Please see specific information pulled into the AVS if appropriate.

## 2024-08-20 RX ORDER — LISINOPRIL 40 MG/1
40 TABLET ORAL DAILY
Qty: 90 TABLET | Refills: 3 | Status: SHIPPED | OUTPATIENT
Start: 2024-08-20

## 2024-08-20 RX ORDER — OMEPRAZOLE 20 MG/1
20 CAPSULE, DELAYED RELEASE ORAL DAILY
Qty: 90 CAPSULE | Refills: 3 | Status: SHIPPED | OUTPATIENT
Start: 2024-08-20

## 2024-08-20 RX ORDER — ATORVASTATIN CALCIUM 20 MG/1
20 TABLET, FILM COATED ORAL DAILY
Qty: 90 TABLET | Refills: 3 | Status: SHIPPED | OUTPATIENT
Start: 2024-08-20

## 2024-09-27 ENCOUNTER — OFFICE VISIT (OUTPATIENT)
Dept: SLEEP MEDICINE | Age: 74
End: 2024-09-27
Payer: MEDICARE

## 2024-09-27 VITALS
HEART RATE: 87 BPM | BODY MASS INDEX: 35.99 KG/M2 | HEIGHT: 70 IN | OXYGEN SATURATION: 97 % | TEMPERATURE: 97.8 F | SYSTOLIC BLOOD PRESSURE: 140 MMHG | WEIGHT: 251.4 LBS | DIASTOLIC BLOOD PRESSURE: 70 MMHG

## 2024-09-27 DIAGNOSIS — E66.01 MORBID (SEVERE) OBESITY DUE TO EXCESS CALORIES: ICD-10-CM

## 2024-09-27 DIAGNOSIS — G47.33 OBSTRUCTIVE SLEEP APNEA, ADULT: Primary | ICD-10-CM

## 2024-11-18 ENCOUNTER — HOSPITAL ENCOUNTER (OUTPATIENT)
Dept: RADIATION ONCOLOGY | Facility: HOSPITAL | Age: 74
Setting detail: RADIATION/ONCOLOGY SERIES
Discharge: HOME | End: 2024-11-18
Payer: MEDICARE

## 2024-11-18 ENCOUNTER — CLINICAL SUPPORT (OUTPATIENT)
Dept: RADIATION ONCOLOGY | Facility: HOSPITAL | Age: 74
End: 2024-11-18
Payer: MEDICARE

## 2024-11-18 DIAGNOSIS — C61 PROSTATE CANCER: Primary | ICD-10-CM

## 2024-11-18 NOTE — PROGRESS NOTES
TELEMEDICINE FOLLOW UP NOTE    PATIENT:                                                      Erik Arcos  MEDICAL RECORD #:                        [id number]  :                                                          1950  COMPLETION DATE:   2023  DIAGNOSIS:       Adenocarcinoma of the prostate  -  Stage IIB (T1c N0 M0)       This visit has been converted to a telehealth virtual visit, the patient's preferred method for today's follow-up.  Total time of discussion was <10 minutes.  The patient has given verbal consent.      BRIEF HISTORY:  Erik Arcos is a very pleasant 73 y.o. male presenting via telemedicine for routine follow-up visit of his favorable intermediate risk prostate cancer.  He was found to have an elevated PSA of 4.4 and was sent for an MRI of the prostate on 3/26/2023 which showed two PI-RADS 4 lesions, 1 of which was abutting the capsule.  Imaging otherwise showed no evidence of extraprostatic extension or concerning adenopathy.  The patient underwent a biopsy with Dr. Berger.  This revealed [place] 3+3 = 6 disease in the right base and right apex, and [place] 3+4 = 7 disease at the right mid.  The patient elected for definitive treatment with CyberKnife SBRT to a dose of 35 Gray in 5 fractions, completing 2023.  He tolerated treatment well.  He currently reports minimal lower urinary tract symptoms that are well-managed on daily Flomax.  He denies gross hematuria, dysuria, or urinary incontinence.  He reports bowel function is normal.  He describes chronic moderate ED which is responsive to sildenafil 100 mg as needed.  He denies new/focal bony pains.  He denies unexplained weight loss.  Overall, he feels well.  Initial posttreatment PSA on 2023 was down to a value of 0.635, with further PSA reduction on 2024 down to a value of 0.256.          MEDICATIONS: Medication reconciliation for the patient was reviewed and confirmed in the electronic medical  record.    Review of Systems   Constitutional:  Positive for fatigue.   Genitourinary:  Positive for frequency and nocturia.    All other systems reviewed and are negative.    KPS 90%          Physical Exam  Pulmonary:      Respirations even, unlabored. No audible wheezing or cough.  Neurological:      A+Ox4, conversant, answers questions appropriately.  Psychiatric:     Judgement, affect, and decision-making WNL.    Limited physical exam as visit was conducted remotely via telephone.        The following portions of the patient's history were reviewed and updated as appropriate: allergies, current medications, past family history, past medical history, past social history, past surgical history and problem list.         Diagnoses and all orders for this visit:    1. Prostate cancer (Primary)         IMPRESSION:  Erik Arcos is a 73 y.o. gentleman with known diagnosis of a clinical T1c, [place] 3+4 = 7 prostate adenocarcinoma with pre-treatment PSA of 4.4.  He is now 16 months status post CyberKnife SBRT.  He tolerated treatment well.  No symptomology to suggest late radiation related toxicities at this time.  He continues daily Flomax for management of chronic BPH lower urinary tract symptoms, which he reports are tolerable as long as he takes his medication.  He has had an excellent response to treatment with sequential drop in PSA to a james value of 0.256 on 5/21/2024, indicative of biochemical remission.  At this point, prognosis should remain excellent.  The patient and I again reviewed follow-up intervals, serial PSA monitoring, and ongoing expectations for response to treatment.  As long as the PSA does not increase on 2 consecutive occasions and exceed 2 points above the patient's james value, then radiation oncology will sign off and allow urology to continue surveillance and serial PSA testing.    RECOMMENDATIONS:      Adenocarcinoma of the prostate  -stage IIB (T1c N0 M0)  -PSA 4.4  -[place] 3+4 =  7  -No evidence of extraprostatic extension on pelvis MRI  -Prostate volume 70 cc  -Status post fiducial marker placement with Dr. Berger  -Status post CyberKnife SBRT 35 Gray in 5 fractions              -completed 7/5/2023  -PSA 11/21/2023 = 0.635  -PSA 5/21/2024 = 0.256  -Follows with Dr. Berger  -RTC as needed    Return if symptoms worsen or fail to improve, for Office Visit.    SONA Davis      I spent a total of 19 minutes on today's visit, with <10 minutes in virtual communication with the patient via telephone, and the remainder of the time spent in reviewing the relevant history, records, available imaging, and for documentation.

## 2024-11-25 ENCOUNTER — LAB (OUTPATIENT)
Dept: LAB | Facility: HOSPITAL | Age: 74
End: 2024-11-25
Payer: MEDICARE

## 2024-11-25 DIAGNOSIS — I10 PRIMARY HYPERTENSION: ICD-10-CM

## 2024-11-25 DIAGNOSIS — I48.19 PERSISTENT ATRIAL FIBRILLATION: ICD-10-CM

## 2024-11-25 DIAGNOSIS — C61 PROSTATE CANCER: ICD-10-CM

## 2024-11-25 PROCEDURE — 36415 COLL VENOUS BLD VENIPUNCTURE: CPT

## 2024-11-25 PROCEDURE — 84153 ASSAY OF PSA TOTAL: CPT

## 2024-11-25 PROCEDURE — 80048 BASIC METABOLIC PNL TOTAL CA: CPT

## 2024-11-25 PROCEDURE — 84154 ASSAY OF PSA FREE: CPT

## 2024-11-26 LAB
ANION GAP SERPL CALCULATED.3IONS-SCNC: 14.2 MMOL/L (ref 5–15)
BUN SERPL-MCNC: 10 MG/DL (ref 8–23)
BUN/CREAT SERPL: 11.8 (ref 7–25)
CALCIUM SPEC-SCNC: 9 MG/DL (ref 8.6–10.5)
CHLORIDE SERPL-SCNC: 96 MMOL/L (ref 98–107)
CO2 SERPL-SCNC: 21.8 MMOL/L (ref 22–29)
CREAT SERPL-MCNC: 0.85 MG/DL (ref 0.76–1.27)
EGFRCR SERPLBLD CKD-EPI 2021: 90.1 ML/MIN/1.73
GLUCOSE SERPL-MCNC: 112 MG/DL (ref 65–99)
POTASSIUM SERPL-SCNC: 4.5 MMOL/L (ref 3.5–5.2)
SODIUM SERPL-SCNC: 132 MMOL/L (ref 136–145)

## 2024-11-27 LAB
PSA FREE MFR SERPL: <20 %
PSA FREE SERPL-MCNC: <0.02 NG/ML
PSA SERPL-MCNC: 0.1 NG/ML (ref 0–4)

## 2024-12-04 ENCOUNTER — OFFICE VISIT (OUTPATIENT)
Dept: UROLOGY | Facility: CLINIC | Age: 74
End: 2024-12-04
Payer: MEDICARE

## 2024-12-04 VITALS — SYSTOLIC BLOOD PRESSURE: 157 MMHG | OXYGEN SATURATION: 96 % | HEART RATE: 82 BPM | DIASTOLIC BLOOD PRESSURE: 89 MMHG

## 2024-12-04 DIAGNOSIS — N13.8 BPH WITH OBSTRUCTION/LOWER URINARY TRACT SYMPTOMS: ICD-10-CM

## 2024-12-04 DIAGNOSIS — C61 PROSTATE CANCER: Primary | ICD-10-CM

## 2024-12-04 DIAGNOSIS — N40.1 BPH WITH OBSTRUCTION/LOWER URINARY TRACT SYMPTOMS: ICD-10-CM

## 2024-12-04 NOTE — PROGRESS NOTES
Follow Up Office Visit      Patient Name: Erik Arcos  : 1950   MRN: [id number]     Chief Complaint:    Chief Complaint   Patient presents with    Prostate Cancer       Referring Provider: No ref. provider found    History of Present Illness: Erik Arcos is a 74 y.o. male who presents today for follow up of low-volume grade group 2 prostate cancer, significant BPH.  Status post CyberKnife radiation completed 2023.  He is more than 1 year out from his radiation.  His PSA is nearly undetectable.  He manages lower urinary tract symptoms secondary to BPH related to a 70+ gram prostate with tamsulosin daily.  He has no significant complaints.  IPSS score 8.  Satisfied quality of life.  Otherwise he has no concerns.    Lab Results   Component Value Date    PSA 0.1 2024    PSA 0.256 2024    PSA 0.635 2023    PSA 4.4 (H) 2023    PSA 3.5 2022    PSA 4.1 (H) 2022         Subjective      Review of System: Review of Systems   Genitourinary: Negative.  Negative for decreased urine volume, difficulty urinating, dysuria, enuresis, flank pain, frequency, hematuria and urgency.      I have reviewed the ROS documented by my clinical staff, I have updated appropriately and I agree. Chi Berger MD    I have reviewed and the following portions of the patient's history were updated as appropriate: past family history, past medical history, past social history, past surgical history and problem list.    Medications:     Current Outpatient Medications:     amLODIPine (NORVASC) 10 MG tablet, Take 1 tablet by mouth Daily., Disp: 90 tablet, Rfl: 3    atorvastatin (LIPITOR) 20 MG tablet, TAKE 1 TABLET DAILY, Disp: 90 tablet, Rfl: 3    Cholecalciferol (VITAMIN D-3) 1000 UNITS capsule, Take 1,000 Units by mouth Daily., Disp: , Rfl:     Eliquis 5 MG tablet tablet, TAKE 1 TABLET TWICE A DAY, Disp: 180 tablet, Rfl: 3    hydroCHLOROthiazide 12.5 MG tablet, Take 1 tablet by  mouth Daily., Disp: 90 tablet, Rfl: 3    lisinopril (PRINIVIL,ZESTRIL) 40 MG tablet, TAKE 1 TABLET DAILY, Disp: 90 tablet, Rfl: 3    Misc Natural Products (OSTEO BI-FLEX JOINT SHIELD PO), Take 1 tablet by mouth 2 (Two) Times a Day., Disp: , Rfl:     omeprazole (priLOSEC) 20 MG capsule, TAKE 1 CAPSULE DAILY, Disp: 90 capsule, Rfl: 3    sildenafil (VIAGRA) 100 MG tablet, Take 1 tablet by mouth Daily As Needed for Erectile Dysfunction., Disp: 30 tablet, Rfl: 11    tamsulosin (FLOMAX) 0.4 MG capsule 24 hr capsule, Take 1 capsule by mouth Daily., Disp: 90 capsule, Rfl: 3    Allergies:   No Known Allergies    IPSS Questionnaire (AUA-7):  Over the past month…    1)  Incomplete Emptying:       How often have you had a sensation of not emptying you had the sensation of not emptying your bladder completely after you finished urinating?  1 - Less than 1 time in 5   2)  Frequency:       How often have you had the urinate again less than two hours after you finished urinating?  1 - Less than 1 time in 5   3)  Intermittency:       How often have you found you stopped and started again several times when you urinated?   1 - Less than 1 time in 5   4) Urgency:      How often have you found it difficult to postpone urination?  1 - Less than 1 time in 5   5) Weak Stream:      How often have you had a weak urinary stream?  2 - Less than half the time   6) Straining:       How often have you had to push or strain to begin urination?  1 - Less than 1 time in 5   7) Nocturia:      How many times did you most typically get up to urinate from the time you went to bed at night until the time you got up in the morning?  1 - 1 time   Total Score:  8   The International Prostate Symptom Score (IPSS) is used to screen, diagnose, track symptoms of benign prostatic hyperplasia (BPH).   0-7 (Mild Symptoms) 8-19 (Moderate) 20-35 (Severe)   Quality of Life (QoL):  If you were to spend the rest of your life with your urinary condition just the way it  is now, how would you feel about that? 2-Mostly Satisfied   Urine Leakage (Incontinence) 0-No Leakage         Objective     Physical Exam:   Vital Signs:   Vitals:    12/04/24 1334   BP: 157/89   Pulse: 82   SpO2: 96%     There is no height or weight on file to calculate BMI.     Physical Exam  Constitutional:       Appearance: Normal appearance.   HENT:      Head: Normocephalic and atraumatic.      Nose: Nose normal.   Eyes:      Extraocular Movements: Extraocular movements intact.      Conjunctiva/sclera: Conjunctivae normal.      Pupils: Pupils are equal, round, and reactive to light.   Musculoskeletal:         General: Normal range of motion.      Cervical back: Normal range of motion and neck supple.   Skin:     General: Skin is warm and dry.      Findings: No lesion or rash.   Neurological:      General: No focal deficit present.      Mental Status: He is alert and oriented to person, place, and time. Mental status is at baseline.   Psychiatric:         Mood and Affect: Mood normal.         Behavior: Behavior normal.         Labs:   Brief Urine Lab Results       None                 Lab Results   Component Value Date    GLUCOSE 112 (H) 11/25/2024    CALCIUM 9.0 11/25/2024     (L) 11/25/2024    K 4.5 11/25/2024    CO2 21.8 (L) 11/25/2024    CL 96 (L) 11/25/2024    BUN 10 11/25/2024    CREATININE 0.85 11/25/2024    EGFRIFAFRI 117 06/17/2021    EGFRIFNONA 96 06/17/2021    BCR 11.8 11/25/2024    ANIONGAP 14.2 11/25/2024       Lab Results   Component Value Date    WBC 5.9 06/20/2022    HGB 13.4 06/20/2022    HCT 41.1 06/20/2022    MCV 88 06/20/2022     06/20/2022       Images:   No Images in the past 120 days found..    Measures:   Tobacco:   Erik Arcos  reports that he quit smoking about 37 years ago. His smoking use included cigarettes. He started smoking about 52 years ago. He has a 37.5 pack-year smoking history. He has never been exposed to tobacco smoke. He has never used smokeless tobacco.       Assessment / Plan      Assessment/Plan:   74 y.o. male who presented today for follow up of low-volume grade group 2 prostate cancer status post CyberKnife radiation completed August 2023.  PSA is nearly undetectable.  He is in remission.  Doing very well.  He would like to continue tamsulosin given stability of urinary symptoms.  If these worsen I recommend a cystoscopy and consideration of surgical options given his significant BPH with a 70 g plus prostate.  He reports understanding.  We will continue PSA surveillance at 1 year follow up.     Diagnoses and all orders for this visit:    1. Prostate cancer (Primary)  -     PSA Diagnostic; Future    2. BPH with obstruction/lower urinary tract symptoms           Follow Up:   Return in about 1 year (around 12/4/2025) for Follow Up after Labs.    I spent approximately 20 minutes providing clinical care for this patient; including review of patient's chart and provider documentation, face to face time spent with patient in examination room (obtaining history, performing physical exam, discussing diagnosis and management options), placing orders, and completing patient documentation.     Chi Berger MD  [facility] [place]

## 2025-01-02 ENCOUNTER — OFFICE VISIT (OUTPATIENT)
Dept: INTERNAL MEDICINE | Facility: CLINIC | Age: 75
End: 2025-01-02
Payer: MEDICARE

## 2025-01-02 VITALS
SYSTOLIC BLOOD PRESSURE: 129 MMHG | WEIGHT: 250 LBS | HEART RATE: 84 BPM | RESPIRATION RATE: 18 BRPM | DIASTOLIC BLOOD PRESSURE: 77 MMHG | HEIGHT: 70 IN | OXYGEN SATURATION: 98 % | BODY MASS INDEX: 35.79 KG/M2 | TEMPERATURE: 98.2 F

## 2025-01-02 DIAGNOSIS — E78.5 HYPERLIPIDEMIA, UNSPECIFIED HYPERLIPIDEMIA TYPE: ICD-10-CM

## 2025-01-02 DIAGNOSIS — I10 PRIMARY HYPERTENSION: ICD-10-CM

## 2025-01-02 DIAGNOSIS — H53.9 VISION DISTURBANCE: Primary | ICD-10-CM

## 2025-01-02 DIAGNOSIS — I48.19 PERSISTENT ATRIAL FIBRILLATION: ICD-10-CM

## 2025-01-02 PROCEDURE — 1160F RVW MEDS BY RX/DR IN RCRD: CPT | Performed by: INTERNAL MEDICINE

## 2025-01-02 PROCEDURE — G2211 COMPLEX E/M VISIT ADD ON: HCPCS | Performed by: INTERNAL MEDICINE

## 2025-01-02 PROCEDURE — 3074F SYST BP LT 130 MM HG: CPT | Performed by: INTERNAL MEDICINE

## 2025-01-02 PROCEDURE — 99214 OFFICE O/P EST MOD 30 MIN: CPT | Performed by: INTERNAL MEDICINE

## 2025-01-02 PROCEDURE — 3078F DIAST BP <80 MM HG: CPT | Performed by: INTERNAL MEDICINE

## 2025-01-02 PROCEDURE — 1126F AMNT PAIN NOTED NONE PRSNT: CPT | Performed by: INTERNAL MEDICINE

## 2025-01-02 PROCEDURE — 1159F MED LIST DOCD IN RCRD: CPT | Performed by: INTERNAL MEDICINE

## 2025-01-02 NOTE — PROGRESS NOTES
"Subjective  Erik Arcos is a 74 y.o. male    HPI coming in for follow-up patient with a history of hypertension and dyslipidemia history of reflux esophagitis.  Needed better BP control for which she was placed on HCTZ.  He does not seem to tolerate this well and stopped taking this medication about a month ago.  In spite of this BP today appears to be well-controlled  He complains of vision disturbance with decreased acuity.  Has sagging upper lids.  The following portions of the patient's history were reviewed and updated as appropriate: allergies, current medications, past family history, past medical history, past social history, past surgical history, and problem list.     Review of Systems   Constitutional:  Positive for fatigue. Negative for activity change, appetite change and fever.   HENT:  Negative for congestion, ear discharge, ear pain and trouble swallowing.    Eyes:  Positive for visual disturbance. Negative for photophobia.   Respiratory:  Negative for cough and shortness of breath.    Cardiovascular:  Negative for chest pain and palpitations.   Gastrointestinal:  Negative for abdominal distention, abdominal pain, constipation, diarrhea, nausea and vomiting.   Endocrine: Negative.    Genitourinary:  Negative for dysuria, hematuria and urgency.   Musculoskeletal:  Positive for arthralgias. Negative for back pain, joint swelling and myalgias.   Skin:  Negative for color change and rash.   Allergic/Immunologic: Negative.    Neurological:  Negative for dizziness, weakness, light-headedness and headaches.   Hematological:  Negative for adenopathy. Does not bruise/bleed easily.   Psychiatric/Behavioral:  Negative for agitation, confusion and dysphoric mood. The patient is not nervous/anxious.        Visit Vitals  /77   Pulse 84   Temp 98.2 °F (36.8 °C)   Resp 18   Ht 177.8 cm (70\")   Wt 113 kg (250 lb)   SpO2 98%   BMI 35.87 kg/m²       Objective  Physical Exam  Constitutional:       General: He " is not in acute distress.     Appearance: He is well-developed.   HENT:      Nose: Nose normal.   Eyes:      General: No scleral icterus.     Conjunctiva/sclera: Conjunctivae normal.   Neck:      Thyroid: No thyromegaly.      Trachea: No tracheal deviation.   Cardiovascular:      Rate and Rhythm: Normal rate and regular rhythm.      Heart sounds: No murmur heard.     No friction rub.   Pulmonary:      Effort: No respiratory distress.      Breath sounds: No wheezing or rales.   Abdominal:      General: There is no distension.      Palpations: Abdomen is soft. There is no mass.      Tenderness: There is no abdominal tenderness. There is no guarding.   Musculoskeletal:         General: Deformity present. Normal range of motion.   Lymphadenopathy:      Cervical: No cervical adenopathy.   Skin:     General: Skin is warm and dry.      Findings: No erythema or rash.   Neurological:      Mental Status: He is alert and oriented to person, place, and time.      Cranial Nerves: No cranial nerve deficit.      Coordination: Coordination normal.      Deep Tendon Reflexes: Reflexes are normal and symmetric.   Psychiatric:         Behavior: Behavior normal.         Thought Content: Thought content normal.         Judgment: Judgment normal.       Diagnoses and all orders for this visit:    Vision disturbance with upper lid sagging, possible cataract referral to ophthalmology  -     Ambulatory Referral to Ophthalmology    Primary hypertension BP control okay without HCTZ may continue to hold off on it    Persistent atrial fibrillation stable rate control okay on Eliquis

## 2025-01-03 LAB
ALBUMIN SERPL-MCNC: 4.4 G/DL (ref 3.8–4.8)
ALP SERPL-CCNC: 69 IU/L (ref 44–121)
ALT SERPL-CCNC: 12 IU/L (ref 0–44)
AST SERPL-CCNC: 15 IU/L (ref 0–40)
BILIRUB SERPL-MCNC: 0.5 MG/DL (ref 0–1.2)
BUN SERPL-MCNC: 13 MG/DL (ref 8–27)
BUN/CREAT SERPL: 15 (ref 10–24)
CALCIUM SERPL-MCNC: 9.4 MG/DL (ref 8.6–10.2)
CHLORIDE SERPL-SCNC: 100 MMOL/L (ref 96–106)
CHOLEST SERPL-MCNC: 176 MG/DL (ref 100–199)
CO2 SERPL-SCNC: 22 MMOL/L (ref 20–29)
CREAT SERPL-MCNC: 0.85 MG/DL (ref 0.76–1.27)
EGFRCR SERPLBLD CKD-EPI 2021: 90 ML/MIN/1.73
GLOBULIN SER CALC-MCNC: 2.2 G/DL (ref 1.5–4.5)
GLUCOSE SERPL-MCNC: 106 MG/DL (ref 70–99)
HDLC SERPL-MCNC: 70 MG/DL
LDLC SERPL CALC-MCNC: 89 MG/DL (ref 0–99)
POTASSIUM SERPL-SCNC: 4.3 MMOL/L (ref 3.5–5.2)
PROT SERPL-MCNC: 6.6 G/DL (ref 6–8.5)
SODIUM SERPL-SCNC: 137 MMOL/L (ref 134–144)
TRIGL SERPL-MCNC: 96 MG/DL (ref 0–149)
VLDLC SERPL CALC-MCNC: 17 MG/DL (ref 5–40)

## 2025-05-20 ENCOUNTER — TELEPHONE (OUTPATIENT)
Dept: CARDIOLOGY | Facility: CLINIC | Age: 75
End: 2025-05-20
Payer: MEDICARE

## 2025-05-20 NOTE — TELEPHONE ENCOUNTER
Left voicemail to remind patient that today's appointment with Dr. Hargrove is at the [facility]      Appt Details: 5.20.25 2:30pm  Location:  [address] [place] [id number]    Please arrive 15 minutes before your appointment time. Registration is located on the 2nd floor. They will direct you to Cardiology waiting area after Check In.         Hub to relay

## 2025-05-20 NOTE — TELEPHONE ENCOUNTER
Patient arrived at [facility] for [facility] appointment.     Reached out to patient to provide updated appointment information.    New Appointment on 6.3.25 @3pm with Dr. William Hargrove at [facility]    [address] [place] [address].    If patient returns call please transfer to [phone].

## 2025-05-20 NOTE — TELEPHONE ENCOUNTER
Caller: Erik Arcos    Relationship: Self    Best call back number: [phone]     What is the best time to reach you: ANY    Who are you requesting to speak with (clinical staff, provider,  specific staff member): ANY      What was the call regarding: PATIENT CALLED BACK AND HAS BEEN MADE AWARE OF THE 6-3-25 APPT. @ 3:00 PM.

## 2025-06-03 ENCOUNTER — OFFICE VISIT (OUTPATIENT)
Dept: CARDIOLOGY | Facility: CLINIC | Age: 75
End: 2025-06-03
Payer: MEDICARE

## 2025-06-03 VITALS
HEIGHT: 70 IN | SYSTOLIC BLOOD PRESSURE: 136 MMHG | BODY MASS INDEX: 32.57 KG/M2 | OXYGEN SATURATION: 98 % | DIASTOLIC BLOOD PRESSURE: 78 MMHG | WEIGHT: 227.5 LBS

## 2025-06-03 DIAGNOSIS — I48.19 PERSISTENT ATRIAL FIBRILLATION: Primary | ICD-10-CM

## 2025-06-03 DIAGNOSIS — I10 PRIMARY HYPERTENSION: ICD-10-CM

## 2025-06-03 DIAGNOSIS — G47.33 OSA (OBSTRUCTIVE SLEEP APNEA): ICD-10-CM

## 2025-06-03 PROCEDURE — 99214 OFFICE O/P EST MOD 30 MIN: CPT

## 2025-06-03 PROCEDURE — 3078F DIAST BP <80 MM HG: CPT

## 2025-06-03 PROCEDURE — 3075F SYST BP GE 130 - 139MM HG: CPT

## 2025-06-03 PROCEDURE — 93000 ELECTROCARDIOGRAM COMPLETE: CPT

## 2025-06-03 NOTE — PROGRESS NOTES
Cardiac Electrophysiology Outpatient Note  [facility] at [facility]    Office Visit     Erik Arcos  [id number]  06/03/2025    Primary Care Physician: Foster Schuster MD    Referred By: No ref. provider found    Subjective     CC: atrial fibrillation    Problem List:   Persistent Atrial Fibrillation   CHADSVASC: 2  Diagnosed incidentally in the setting of pre op for cataract surgery   Echo: 10/28/21: Normal LVEF, Mild concentric LVH, severe LA dilation   Stress Test 10/28/21: normal ,myocardial perfusion imaging no evidence of ischemia. There is a coronary artery calcification on the CT portion of the stress test. Findings consistent with low risk study  2/1/22 ECV with recurrence within 1 month while on Sotalol.   PVA 04/27/2022 with posterior wall isolation and CTI RFA (Delvin)  HTN   HLD  GERD  LAUREL, CPAP compliant  Surgical History  Ankle surgery  201    History of Present Illness:   Erik Arcos is a 74 y.o. male who presents to my electrophysiology clinic for follow up of persistent atrial fibrillation s/p ablation for this in April 2022. This consisted of pulmonary vein isolation, posterior wall isolation, and CTI ablation. Since we last saw the patient in May of 2025, he has done well from a cardiac standpoint.  He was not able to tolerate HCTZ to be added at last visit due to dizziness and leg cramping.  His blood pressures have been controlled at home on current regimen however.  He denies any chest pain, palpitations, shortness of breath, edema or near/full syncope.  He has had no further atrial fibrillation which she is aware.    Past Medical History:   Diagnosis Date    Arrhythmia     Arthritis     Atrial fibrillation     Coronary artery disease     Full dentures     GERD (gastroesophageal reflux disease)     History of prediabetes     Hyperlipidemia     Hypertension     LAUREL on CPAP     Prostate cancer     Prostate cancer 04/27/2023    Sleep apnea     Vitamin D  deficiency     Wears glasses        Past Surgical History:   Procedure Laterality Date    ANKLE SURGERY      CARDIAC ELECTROPHYSIOLOGY PROCEDURE N/A 2022    Procedure: PVA (persistent), hold Tikosyn x 5 days;  Surgeon: William Hargrove MD;  Location: [facility] INVASIVE LOCATION;  Service: Cardiovascular;  Laterality: N/A;    COLONOSCOPY      Complete    CYBERKNIFE  2023    Prostate    CYSTOSCOPY  Do not now    POLYPECTOMY      PROSTATE FIDUCIAL MARKER PLACEMENT  2023    STOMACH SURGERY      barretts esophagus       Family History   Problem Relation Age of Onset    Cancer Mother     Uterine cancer Mother     No Known Problems Father     No Known Problems Sister     No Known Problems Half-Brother     Diabetes Half-Sister        Social History     Socioeconomic History    Marital status:    Tobacco Use    Smoking status: Former     Current packs/day: 0.00     Average packs/day: 2.5 packs/day for 15.0 years (37.5 ttl pk-yrs)     Types: Cigarettes     Start date: 1972     Quit date: 1987     Years since quittin.2     Passive exposure: Never    Smokeless tobacco: Never   Vaping Use    Vaping status: Never Used   Substance and Sexual Activity    Alcohol use: No    Drug use: No    Sexual activity: Not Currently         Current Outpatient Medications:     amLODIPine (NORVASC) 10 MG tablet, Take 1 tablet by mouth Daily., Disp: 90 tablet, Rfl: 3    atorvastatin (LIPITOR) 20 MG tablet, TAKE 1 TABLET DAILY, Disp: 90 tablet, Rfl: 3    Cholecalciferol (VITAMIN D-3) 1000 UNITS capsule, Take 1,000 Units by mouth Daily., Disp: , Rfl:     Eliquis 5 MG tablet tablet, TAKE 1 TABLET TWICE A DAY, Disp: 180 tablet, Rfl: 3    lisinopril (PRINIVIL,ZESTRIL) 40 MG tablet, TAKE 1 TABLET DAILY, Disp: 90 tablet, Rfl: 3    Misc Natural Products (OSTEO BI-FLEX JOINT SHIELD PO), Take 1 tablet by mouth 2 (Two) Times a Day., Disp: , Rfl:     omeprazole (priLOSEC) 20 MG capsule, TAKE 1 CAPSULE DAILY, Disp: 90  "capsule, Rfl: 3    sildenafil (VIAGRA) 100 MG tablet, Take 1 tablet by mouth Daily As Needed for Erectile Dysfunction., Disp: 30 tablet, Rfl: 11    tamsulosin (FLOMAX) 0.4 MG capsule 24 hr capsule, Take 1 capsule by mouth Daily., Disp: 90 capsule, Rfl: 3    Allergies:   No Known Allergies    Objective   Vital Signs: Blood pressure 136/78, height 177.8 cm (70\"), weight 103 kg (227 lb 8 oz), SpO2 98%.    PHYSICAL EXAM  General appearance: Awake, alert, cooperative  Head: Normocephalic, without obvious abnormality, atraumatic  Lungs: Clear to ascultation bilaterally  Heart: Regular rate and rhythm, no murmurs, no lower extremity swelling  Skin: Skin color, turgor normal, no rashes or lesions  Neurologic: Grossly normal     Lab Results   Component Value Date    GLUCOSE 106 (H) 01/02/2025    CALCIUM 9.4 01/02/2025     01/02/2025    K 4.3 01/02/2025    CO2 22 01/02/2025     01/02/2025    BUN 13 01/02/2025    CREATININE 0.85 01/02/2025    EGFRIFAFRI 117 06/17/2021    EGFRIFNONA 96 06/17/2021    BCR 15 01/02/2025    ANIONGAP 14.2 11/25/2024     Lab Results   Component Value Date    WBC 5.9 06/20/2022    HGB 13.4 06/20/2022    HCT 41.1 06/20/2022    MCV 88 06/20/2022     06/20/2022     Lab Results   Component Value Date    INR 1.11 04/25/2022    INR 1.03 04/19/2022    PROTIME 14.3 04/25/2022    PROTIME 13.4 04/19/2022     Lab Results   Component Value Date    TSH 2.890 04/25/2022          Results for orders placed during the hospital encounter of 10/28/21    Adult Transthoracic Echo Complete W/ Cont if Necessary Per Protocol    Interpretation Summary  · Left ventricular wall thickness is consistent with mild concentric hypertrophy.  · The right ventricular cavity is mildly dilated.  · Left atrial volume is severely increased.  · The right atrial cavity is moderately dilated.  · The underlying rhythm appears to be atrial fibrillation  · The left ventricular ejection fraction is 60%         I personally " viewed and interpreted the patient's EKG/Telemetry/lab data      ECG 12 Lead    Date/Time: 6/3/2025 3:20 PM  Performed by: Zaki Mayes PA-C    Authorized by: Zaki Mayes PA-C  Comparison: compared with previous ECG from 5/20/2024  Similar to previous ECG  Comparison to previous ECG: PVCs no longer present  Rhythm: sinus arrhythmia  BPM: 75    Clinical impression: normal ECG          Erik Arcos  reports that he quit smoking about 38 years ago. His smoking use included cigarettes. He started smoking about 53 years ago. He has a 37.5 pack-year smoking history. He has never been exposed to tobacco smoke. He has never used smokeless tobacco.        Advance Care Planning   Advance Care Planning: ACP discussion was declined by the patient. Patient does not have an advance directive, information provided.     Assessment & Plan    1. Persistent atrial fibrillation  He underwent ablation for this by Dr. Hargrove in April 2022 consisting of pulmonary vein isolation, posterior wall isolation, and CTI ablation.  He has overall done quite well since then without symptomatic recurrence of atrial fibrillation.  He is in sinus rhythm on EKG today.  Will continue Eliquis for anticoagulation.     2. Primary hypertension  BP controlled in office today at 136/78. Continue current antihypertensive regimen. Did not tolerate HCTZ d/t dizziness and lower extremity cramping       Follow Up:  Return in about 1 year (around 6/3/2026).      Thank you for allowing me to participate in the care of your patient. Please do not hesitate to contact me with additional questions or concerns.      Zaki Mayes PA-C  Cardiac Electrophysiology  [facility] / [facility]

## 2025-06-05 DIAGNOSIS — N40.1 BPH WITH OBSTRUCTION/LOWER URINARY TRACT SYMPTOMS: ICD-10-CM

## 2025-06-05 DIAGNOSIS — N13.8 BPH WITH OBSTRUCTION/LOWER URINARY TRACT SYMPTOMS: ICD-10-CM

## 2025-06-05 RX ORDER — TAMSULOSIN HYDROCHLORIDE 0.4 MG/1
1 CAPSULE ORAL DAILY
Qty: 90 CAPSULE | Refills: 3 | Status: SHIPPED | OUTPATIENT
Start: 2025-06-05

## 2025-06-05 NOTE — TELEPHONE ENCOUNTER
Rx Refill Note  Requested Prescriptions     Pending Prescriptions Disp Refills    tamsulosin (FLOMAX) 0.4 MG capsule 24 hr capsule [Pharmacy Med Name: TAMSULOSIN CAP 0.4MG] 90 capsule 3     Sig: TAKE 1 CAPSULE DAILY      Last office visit with prescribing clinician: 12/4/2024   Last telemedicine visit with prescribing clinician: Visit date not found   Next office visit with prescribing clinician: 12/4/2025       Xena Noel  06/05/25, 14:56 EDT

## 2025-06-11 RX ORDER — HYDROCHLOROTHIAZIDE 12.5 MG/1
12.5 TABLET ORAL DAILY
Qty: 90 TABLET | Refills: 3 | OUTPATIENT
Start: 2025-06-11

## 2025-07-02 RX ORDER — AMLODIPINE BESYLATE 10 MG/1
10 TABLET ORAL DAILY
Qty: 90 TABLET | Refills: 3 | Status: SHIPPED | OUTPATIENT
Start: 2025-07-02

## 2025-07-03 ENCOUNTER — OFFICE VISIT (OUTPATIENT)
Dept: INTERNAL MEDICINE | Facility: CLINIC | Age: 75
End: 2025-07-03
Payer: MEDICARE

## 2025-07-03 VITALS
SYSTOLIC BLOOD PRESSURE: 122 MMHG | HEIGHT: 70 IN | BODY MASS INDEX: 31.07 KG/M2 | HEART RATE: 78 BPM | WEIGHT: 217 LBS | RESPIRATION RATE: 16 BRPM | OXYGEN SATURATION: 98 % | TEMPERATURE: 98.1 F | DIASTOLIC BLOOD PRESSURE: 72 MMHG

## 2025-07-03 DIAGNOSIS — I48.19 PERSISTENT ATRIAL FIBRILLATION: Primary | ICD-10-CM

## 2025-07-03 DIAGNOSIS — C61 PROSTATE CANCER: ICD-10-CM

## 2025-07-03 DIAGNOSIS — I10 PRIMARY HYPERTENSION: ICD-10-CM

## 2025-07-03 DIAGNOSIS — E78.5 HYPERLIPIDEMIA, UNSPECIFIED HYPERLIPIDEMIA TYPE: ICD-10-CM

## 2025-07-03 RX ORDER — ATORVASTATIN CALCIUM 20 MG/1
20 TABLET, FILM COATED ORAL DAILY
Qty: 90 TABLET | Refills: 3 | Status: SHIPPED | OUTPATIENT
Start: 2025-07-03

## 2025-07-03 RX ORDER — LISINOPRIL 40 MG/1
40 TABLET ORAL DAILY
Qty: 90 TABLET | Refills: 3 | Status: SHIPPED | OUTPATIENT
Start: 2025-07-03

## 2025-07-03 RX ORDER — OMEPRAZOLE 20 MG/1
20 CAPSULE, DELAYED RELEASE ORAL DAILY
Qty: 90 CAPSULE | Refills: 3 | Status: SHIPPED | OUTPATIENT
Start: 2025-07-03

## 2025-07-03 NOTE — PROGRESS NOTES
The ABCs of the Annual Wellness Visit  Subsequent Medicare Wellness Visit    Subjective      Erik Arcos is a 74 y.o. male who presents for a Subsequent Medicare Wellness Visit.    Review of Systems   Constitutional: Negative.  Negative for activity change, appetite change, fatigue and fever.   HENT:  Negative for congestion, ear discharge, ear pain and trouble swallowing.    Eyes:  Negative for photophobia and visual disturbance.   Respiratory:  Negative for cough and shortness of breath.    Cardiovascular:  Negative for chest pain and palpitations.   Gastrointestinal:  Negative for abdominal distention, abdominal pain, constipation, diarrhea, nausea and vomiting.   Endocrine: Negative.    Genitourinary:  Positive for enuresis. Negative for dysuria, hematuria and urgency.   Musculoskeletal:  Positive for arthralgias. Negative for back pain, joint swelling and myalgias.   Skin:  Negative for color change and rash.   Allergic/Immunologic: Negative.    Neurological:  Negative for dizziness, weakness, light-headedness and headaches.   Hematological:  Negative for adenopathy. Does not bruise/bleed easily.   Psychiatric/Behavioral:  Negative for agitation, confusion and dysphoric mood. The patient is not nervous/anxious.         The following portions of the patient's history were reviewed and   updated as appropriate: allergies, current medications, past family history, past medical history, past social history, past surgical history, and problem list.    Compared to one year ago, the patient feels his physical   health is better.    Compared to one year ago, the patient feels his mental   health is better.    Recent Hospitalizations:  He was not admitted to the hospital during the last year.       Current Medical Providers:  Patient Care Team:  Foster Schuster MD as PCP - General (Internal Medicine)  Pastor Chang MD as Consulting Physician (General Surgery)  William Hargrove MD as Consulting Physician  (Cardiology)  Alex Manuel MD as Consulting Physician (Ophthalmology)  Shirley Mcguire OD (Optometry)  Chi Berger MD as Consulting Physician (Urology)  Zaki Best APRN as Nurse Practitioner (Pulmonary Disease)  Chivo Cool MD as Consulting Physician (Radiation Oncology)    Outpatient Medications Prior to Visit   Medication Sig Dispense Refill    amLODIPine (NORVASC) 10 MG tablet TAKE 1 TABLET BY MOUTH EVERY DAY 90 tablet 3    atorvastatin (LIPITOR) 20 MG tablet TAKE 1 TABLET DAILY 90 tablet 3    Cholecalciferol (VITAMIN D-3) 1000 UNITS capsule Take 1,000 Units by mouth Daily.      Eliquis 5 MG tablet tablet TAKE 1 TABLET TWICE A  tablet 3    lisinopril (PRINIVIL,ZESTRIL) 40 MG tablet TAKE 1 TABLET DAILY 90 tablet 3    Misc Natural Products (OSTEO BI-FLEX JOINT SHIELD PO) Take 1 tablet by mouth 2 (Two) Times a Day.      omeprazole (priLOSEC) 20 MG capsule TAKE 1 CAPSULE DAILY 90 capsule 3    sildenafil (VIAGRA) 100 MG tablet Take 1 tablet by mouth Daily As Needed for Erectile Dysfunction. 30 tablet 11    tamsulosin (FLOMAX) 0.4 MG capsule 24 hr capsule TAKE 1 CAPSULE DAILY 90 capsule 3     No facility-administered medications prior to visit.       No opioid medication identified on active medication list. I have reviewed chart for other potential  high risk medication/s and harmful drug interactions in the elderly.        Aspirin is not on active medication list.  Aspirin use is not indicated based on review of current medical condition/s. Risk of harm outweighs potential benefits.  .    Patient Active Problem List   Diagnosis    York's esophagus    Esophageal reflux    Hyperlipidemia    Primary hypertension    Male erectile disorder    Persistent atrial fibrillation    LAUREL (obstructive sleep apnea)    Class 1 obesity due to excess calories with serious comorbidity and body mass index (BMI) of 34.0 to 34.9 in adult    Prostate cancer     Advance Care Planning  Advance  "Directive is not on file.  ACP discussion was held with the patient during this visit. Patient does not have an advance directive, declines further assistance.     Objective    Vitals:    07/03/25 1453   BP: 122/72   Pulse: 78   Resp: 16   Temp: 98.1 °F (36.7 °C)   SpO2: 98%   Weight: 98.4 kg (217 lb)   Height: 177.8 cm (70\")   PainSc: 0-No pain     Estimated body mass index is 31.14 kg/m² as calculated from the following:    Height as of this encounter: 177.8 cm (70\").    Weight as of this encounter: 98.4 kg (217 lb).    Physical Exam  Constitutional:       General: He is not in acute distress.     Appearance: He is well-developed.   HENT:      Nose: Nose normal.   Eyes:      General: No scleral icterus.     Conjunctiva/sclera: Conjunctivae normal.   Neck:      Thyroid: No thyromegaly.      Trachea: No tracheal deviation.   Cardiovascular:      Rate and Rhythm: Normal rate and regular rhythm.      Heart sounds: No murmur heard.     No friction rub.   Pulmonary:      Effort: No respiratory distress.      Breath sounds: No wheezing or rales.   Abdominal:      General: There is no distension.      Palpations: Abdomen is soft. There is no mass.      Tenderness: There is no abdominal tenderness. There is no guarding.   Musculoskeletal:         General: Deformity present. Normal range of motion.   Lymphadenopathy:      Cervical: No cervical adenopathy.   Skin:     General: Skin is warm and dry.      Findings: No erythema or rash.   Neurological:      Mental Status: He is alert and oriented to person, place, and time.      Cranial Nerves: No cranial nerve deficit.      Coordination: Coordination normal.      Deep Tendon Reflexes: Reflexes are normal and symmetric.   Psychiatric:         Behavior: Behavior normal.         Thought Content: Thought content normal.         Judgment: Judgment normal.       The 10-year ASCVD risk score (Delia SINGH, et al., 2019) is: 24.6%    Values used to calculate the score:      Age: 74 " years      Sex: Male      Is Non- : No      Diabetic: No      Tobacco smoker: No      Systolic Blood Pressure: 122 mmHg      Is BP treated: Yes      HDL Cholesterol: 70 mg/dL      Total Cholesterol: 176 mg/dL     BMI is >= 30 and <35. (Class 1 Obesity). The following options were offered after discussion;: exercise counseling/recommendations and nutrition counseling/recommendations      Does the patient have evidence of cognitive impairment?   No            HEALTH RISK ASSESSMENT    Smoking Status:  Social History     Tobacco Use   Smoking Status Former    Current packs/day: 0.00    Average packs/day: 2.5 packs/day for 15.0 years (37.5 ttl pk-yrs)    Types: Cigarettes    Start date: 1972    Quit date: 1987    Years since quittin.2    Passive exposure: Never   Smokeless Tobacco Never     Alcohol Consumption:  Social History     Substance and Sexual Activity   Alcohol Use No     Fall Risk Screen:    SALOMONADI Fall Risk Assessment was completed, and patient is at LOW risk for falls.Assessment completed on:7/3/2025    Depression Screenin/3/2025     2:52 PM   PHQ-2/PHQ-9 Depression Screening   Little interest or pleasure in doing things Not at all   Feeling down, depressed, or hopeless Not at all   How difficult have these problems made it for you to do your work, take care of things at home, or get along with other people? Not difficult at all       Health Habits and Functional and Cognitive Screenin/3/2025     2:52 PM   Functional & Cognitive Status   Do you have difficulty preparing food and eating? No   Do you have difficulty bathing yourself, getting dressed or grooming yourself? No   Do you have difficulty using the toilet? No   Do you have difficulty moving around from place to place? No   Do you have trouble with steps or getting out of a bed or a chair? No   Current Diet Well Balanced Diet   Dental Exam Not up to date   Eye Exam Up to date   Exercise (times per  week) 4 times per week   Current Exercises Include Yard Work;Other   Do you need help using the phone?  No   Are you deaf or do you have serious difficulty hearing?  No   Do you need help to go to places out of walking distance? No   Do you need help shopping? No   Do you need help preparing meals?  No   Do you need help with housework?  No   Do you need help with laundry? No   Do you need help taking your medications? No   Do you need help managing money? No   Do you ever drive or ride in a car without wearing a seat belt? No   Have you felt unusual fatigue (could be tiredness), stress, anger or loneliness in the last month? No   Who do you live with? Spouse   If you need help, do you have trouble finding someone available to you? No   Have you been bothered in the last four weeks by sexual problems? No   Do you have difficulty concentrating, remembering or making decisions? No       Age-appropriate Screening Schedule:  Refer to the list below for future screening recommendations based on patient's age, sex and/or medical conditions. Orders for these recommended tests are listed in the plan section. The patient has been provided with a written plan.    Health Maintenance   Topic Date Due    ANNUAL WELLNESS VISIT  06/24/2025    RSV Vaccine - Adults (1 - 1-dose 75+ series) 07/03/2026 (Originally 11/22/2023)    TDAP/TD VACCINES (2 - Td or Tdap) 07/03/2026 (Originally 12/31/2023)    COVID-19 Vaccine (7 - Pfizer risk 2024-25 season) 08/08/2025    LIPID PANEL  01/02/2026    COLORECTAL CANCER SCREENING  03/02/2028    HEPATITIS C SCREENING  Completed    Pneumococcal Vaccine 50+  Completed    ZOSTER VACCINE  Completed    INFLUENZA VACCINE  Discontinued    HEMOGLOBIN A1C  Discontinued                CMS Preventative Services Quick Reference  Risk Factors Identified During Encounter:    Polypharmacy: Medication List reviewed and Medications are appropriate for patient    The above risks/problems have been discussed with the  patient.  Pertinent information has been shared with the patient in the After Visit Summary.    Diagnoses and all orders for this visit:    1. Persistent atrial fibrillation (Primary) stable currently in regular rate.  Continue Eliquis    2. Primary hypertension stable with current meds and low-salt diet.  We recently discontinued HCTZ continues to do well because of his significant weight loss with diet restrictions    3. Hyperlipidemia, unspecified hyperlipidemia type continue with the diet restrictions and the statins    4. Prostate cancer stable following up with urology most recent PSA was down significantly        Follow Up:   Next Medicare Wellness visit to be scheduled in 1 year.      An After Visit Summary and PPPS were made available to the patient.

## 2025-07-05 DIAGNOSIS — I48.19 PERSISTENT ATRIAL FIBRILLATION: ICD-10-CM

## 2025-07-07 RX ORDER — APIXABAN 5 MG/1
5 TABLET, FILM COATED ORAL 2 TIMES DAILY
Qty: 180 TABLET | Refills: 3 | Status: SHIPPED | OUTPATIENT
Start: 2025-07-07

## 2025-07-24 DIAGNOSIS — N52.9 ERECTILE DYSFUNCTION, UNSPECIFIED ERECTILE DYSFUNCTION TYPE: ICD-10-CM

## 2025-07-24 RX ORDER — SILDENAFIL 100 MG/1
100 TABLET, FILM COATED ORAL DAILY PRN
Qty: 30 TABLET | Refills: 11 | Status: SHIPPED | OUTPATIENT
Start: 2025-07-24

## 2025-07-24 NOTE — TELEPHONE ENCOUNTER
Rx Refill Note  Requested Prescriptions     Pending Prescriptions Disp Refills    sildenafil (VIAGRA) 100 MG tablet [Pharmacy Med Name: SILDENAFIL TAB 100MG] 30 tablet 11     Sig: TAKE 1 TABLET DAILY AS     NEEDED FOR ERECTILE        DYSFUNCTION      Last office visit with prescribing clinician: 12/4/2024   Last telemedicine visit with prescribing clinician: Visit date not found   Next office visit with prescribing clinician: 12/4/2025       Xena Noel  07/24/25, 07:44 EDT

## (undated) DEVICE — PROB S-CATH TEMP ESOPH 10F

## (undated) DEVICE — ST EXT IV LG BORE NDLESS FLTR LL 17IN

## (undated) DEVICE — DECANT BG O JET

## (undated) DEVICE — ST INF PRI SMRTSTE 20DRP 2VLV 24ML 117

## (undated) DEVICE — SYRINGE,PISTON,IRRIGATION,60ML,STERILE: Brand: MEDLINE

## (undated) DEVICE — ST EXT IV SMARTSITE 2VLV SP M LL 5ML IV1

## (undated) DEVICE — PRESSURE MONITORING SET: Brand: TRUWAVE

## (undated) DEVICE — RETR ESOPH ESOSURE W/TEMP/CONTRL NITNL STY 27F

## (undated) DEVICE — DRSNG SURESITE123 4X4.8IN

## (undated) DEVICE — 1 X VERSACROSS TRANSSEPTAL SHEATH (INCLUDING  1 X J-TIP GUIDEWIRE); 1 X VERSACROSS RF WIRE (INCLUDING 1 X CONNECTOR CABLE (SINGLE USE)); 1 X DISPERSIVE ELECTRODE: Brand: VERSACROSS ACCESS SOLUTION

## (undated) DEVICE — Device: Brand: PENTARAY NAV

## (undated) DEVICE — Device: Brand: MEDEX

## (undated) DEVICE — SET PRIMARY GRVTY 10DP MALE LL 104IN

## (undated) DEVICE — Device: Brand: RFP-100A CONNECTOR CABLE

## (undated) DEVICE — Device: Brand: WEBSTER CS

## (undated) DEVICE — INTRO SHEATH ART/FEM ENGAGE .035IN 9F 25CM

## (undated) DEVICE — Device: Brand: REFERENCE PATCH CARTO 3

## (undated) DEVICE — SYS CLS VASC/VENI VASCADE MVP 6TO12F

## (undated) DEVICE — STERILE (15.2 TAPERED TO 7.6 X 183CM) POLYETHYLENE ACCORDION-FOLDED COVER FOR USE WITH SIEMENS ACUNAV ULTRASOUND CATHETER FAMILY CONNECTOR: Brand: SWIFTLINK TRANSDUCER COVER

## (undated) DEVICE — ADULT, W/LG. BACK PAD, RADIOTRANSPARENT ELEMENT AND LEAD WIRE: Brand: DEFIBRILLATION ELECTRODES

## (undated) DEVICE — Device: Brand: VIZIGO

## (undated) DEVICE — Device: Brand: THERMOCOOL SMARTTOUCH SF

## (undated) DEVICE — INTRO SHEATH ENGAGE W/50 GW .038 7F12

## (undated) DEVICE — NDL PERC 1PART ECHOTIP WO/BASEPLT 18G 7CM

## (undated) DEVICE — SOL NACL 0.9PCT 1000ML

## (undated) DEVICE — ELECTRD RETRN/GRND MEGADYNE SGL/PLT W/CORD 9FT DISP

## (undated) DEVICE — Device: Brand: SOUNDSTAR

## (undated) DEVICE — INTRO SHEATH ENGAGE W/50 GW .038 9F12

## (undated) DEVICE — Device: Brand: SMARTABLATE

## (undated) DEVICE — KT MANIFLD EP

## (undated) DEVICE — INTRO SHEATH ENGAGE W/50 GW .038 8F12

## (undated) DEVICE — DOME MONITORING W BONDED STPCK BIOTRANS2

## (undated) DEVICE — LEX ELECTRO PHYSIOLOGY: Brand: MEDLINE INDUSTRIES, INC.